# Patient Record
Sex: MALE | Race: WHITE | NOT HISPANIC OR LATINO | Employment: FULL TIME | URBAN - METROPOLITAN AREA
[De-identification: names, ages, dates, MRNs, and addresses within clinical notes are randomized per-mention and may not be internally consistent; named-entity substitution may affect disease eponyms.]

---

## 2018-06-14 ENCOUNTER — HOSPITAL ENCOUNTER (EMERGENCY)
Facility: CLINIC | Age: 38
Discharge: HOME | End: 2018-06-14
Attending: EMERGENCY MEDICINE | Admitting: EMERGENCY MEDICINE

## 2018-06-14 VITALS
TEMPERATURE: 98.5 F | SYSTOLIC BLOOD PRESSURE: 126 MMHG | DIASTOLIC BLOOD PRESSURE: 99 MMHG | BODY MASS INDEX: 23.68 KG/M2 | HEART RATE: 77 BPM | WEIGHT: 165 LBS | OXYGEN SATURATION: 98 % | RESPIRATION RATE: 18 BRPM

## 2018-06-14 DIAGNOSIS — R11.0 NAUSEA: ICD-10-CM

## 2018-06-14 DIAGNOSIS — K08.89 PAIN, DENTAL: ICD-10-CM

## 2018-06-14 DIAGNOSIS — F41.9 ANXIETY: ICD-10-CM

## 2018-06-14 LAB — INTERPRETATION ECG - MUSE: NORMAL

## 2018-06-14 PROCEDURE — 25000132 ZZH RX MED GY IP 250 OP 250 PS 637: Performed by: EMERGENCY MEDICINE

## 2018-06-14 PROCEDURE — 90791 PSYCH DIAGNOSTIC EVALUATION: CPT

## 2018-06-14 PROCEDURE — 99285 EMERGENCY DEPT VISIT HI MDM: CPT | Mod: 25

## 2018-06-14 PROCEDURE — 25000125 ZZHC RX 250: Performed by: EMERGENCY MEDICINE

## 2018-06-14 PROCEDURE — 93005 ELECTROCARDIOGRAM TRACING: CPT

## 2018-06-14 RX ORDER — PENICILLIN V POTASSIUM 500 MG/1
500 TABLET, FILM COATED ORAL 4 TIMES DAILY
Qty: 28 TABLET | Refills: 0 | Status: SHIPPED | OUTPATIENT
Start: 2018-06-14 | End: 2018-06-21

## 2018-06-14 RX ORDER — CLONAZEPAM 0.5 MG/1
1 TABLET ORAL ONCE
Status: COMPLETED | OUTPATIENT
Start: 2018-06-14 | End: 2018-06-14

## 2018-06-14 RX ORDER — IBUPROFEN 600 MG/1
600 TABLET, FILM COATED ORAL ONCE
Status: COMPLETED | OUTPATIENT
Start: 2018-06-14 | End: 2018-06-14

## 2018-06-14 RX ORDER — ONDANSETRON 4 MG/1
4 TABLET, ORALLY DISINTEGRATING ORAL ONCE
Status: COMPLETED | OUTPATIENT
Start: 2018-06-14 | End: 2018-06-14

## 2018-06-14 RX ORDER — CLONAZEPAM 0.5 MG/1
1 TABLET ORAL 2 TIMES DAILY PRN
COMMUNITY
End: 2024-03-07

## 2018-06-14 RX ADMIN — IBUPROFEN 600 MG: 600 TABLET ORAL at 06:33

## 2018-06-14 RX ADMIN — ONDANSETRON 4 MG: 4 TABLET, ORALLY DISINTEGRATING ORAL at 05:12

## 2018-06-14 RX ADMIN — CLONAZEPAM 1 MG: 0.5 TABLET ORAL at 05:09

## 2018-06-14 ASSESSMENT — ENCOUNTER SYMPTOMS
PALPITATIONS: 1
NAUSEA: 1
NERVOUS/ANXIOUS: 1
SHORTNESS OF BREATH: 0
VOMITING: 1

## 2018-06-14 NOTE — ED TRIAGE NOTES
Pt woke up with anxiety aprrox 2am, out of his clonapin that he normally takes.  Associated n/v which usually accompany anxiety attacks.  Pain in tooth as well.

## 2018-06-14 NOTE — ED AVS SNAPSHOT
[facility] Emergency Department    [address] [facility] [address]    Phone:  [phone]    Fax:  [phone]                                       Giovani Damian   MRN: [id number]    Department:  [facility] Emergency Department   Date of Visit:  6/14/2018           Patient Information     Date Of Birth          1980        Your diagnoses for this visit were:     Anxiety     Nausea     Pain, dental        You were seen by Mehul Luna MD and Michael Krishnamurthy MD.      Follow-up Information     Follow up with Emmie Blancas. Schedule an appointment as soon as possible for a visit in 2 days.    Contact information:    [address] [facility] [address] [phone]          Follow up with [facility] Emergency Department.    Specialty:  EMERGENCY MEDICINE    Why:  If symptoms worsen    Contact information:    [address] [facility] [id number] [phone]        Discharge Instructions       Discharge Instructions  Dental Pain    You have been seen today for a toothache. Your pain may be caused by an exposed nerve, an infection (pulpitis), a root abscess (pocket of pus), or other problems. You will need to see a dentist for a solution to your tooth problem. Emergency Department care is only to help control your problem until you can see a dentist; we cannot provide complete dental care.  Today, we did not find any sign that your toothache was caused by any dangerous or life-threatening condition, but sometimes symptoms develop over time and cannot be found during an emergency visit, so it is very important that you follow up with your dentist.      Generally, every Emergency Department visit should have a follow-up clinic visit with either a primary or a specialty clinic/provider. Please follow-up as instructed by your emergency provider today.    Return to the Emergency Department if:    You develop a  new fever over 100.4 F.    You cannot open your mouth normally, cannot move your tongue well, or cannot swallow.    You have new or increased swelling of your face or neck.    You develop drainage of pus or foul smelling material from around your tooth.  What can I do to help myself?    Take any antibiotic the provider may have prescribed for you today.    Avoid very hot or very cold foods as both can cause pain.    Make an appointment to see a dentist as soon as possible. Dentists are generally not  on-staff  at hospitals so we cannot  refer  to you to dentist but we may be able to provide a list of dental clinics to help you.  If you were given a prescription for medicine here today, be sure to read all of the information (including the package insert) that comes with your prescription.  This will include important information about the medicine, its side effects, and any warnings that you need to know about.  The pharmacist who fills the prescription can provide more information and answer questions you may have about the medicine.  If you have questions or concerns that the pharmacist cannot address, please call or return to the Emergency Department.   Remember that you can always come back to the Emergency Department if you are not able to see your regular provider in the amount of time listed above, if you get any new symptoms, or if there is anything that worries you.      24 Hour Appointment Hotline       To make an appointment at any [facility], call [phone] ([phone]). If you don't have a family doctor or clinic, we will help you find one. [facility] clinics are conveniently located to serve the needs of you and your family.             Review of your medicines      START taking        Dose / Directions Last dose taken    penicillin V potassium 500 MG tablet   Commonly known as:  VEETID   Dose:  500 mg   Quantity:  28 tablet        Take 1 tablet (500 mg) by mouth 4 times daily for 7 days    Refills:  0          Our records show that you are taking the medicines listed below. If these are incorrect, please call your family doctor or clinic.        Dose / Directions Last dose taken    HYDROcodone-acetaminophen 5-325 MG per tablet   Commonly known as:  NORCO   Dose:  1-2 tablet   Quantity:  15 tablet        Take 1-2 tablets by mouth every 4 hours as needed for moderate to severe pain   Refills:  0        KLONOPIN PO        Refills:  0        MOTRIN IB PO        Refills:  0        ZOLOFT PO   Dose:  25 mg        Take 25 mg by mouth daily   Refills:  0                Prescriptions were sent or printed at these locations (1 Prescription)                   Other Prescriptions                Printed at Department/Unit printer (1 of 1)         penicillin V potassium (VEETID) 500 MG tablet                Procedures and tests performed during your visit     EKG 12 lead      Orders Needing Specimen Collection     None      Pending Results     Date and Time Order Name Status Description    6/14/2018 0455 EKG 12 lead Preliminary             Pending Culture Results     No orders found from 6/12/2018 to 6/15/2018.            Pending Results Instructions     If you had any lab results that were not finalized at the time of your Discharge, you can call the ED Lab Result RN at [phone]. You will be contacted by this team for any positive Lab results or changes in treatment. The nurses are available 7 days a week from 10A to 6:30P.  You can leave a message 24 hours per day and they will return your call.        Test Results From Your Hospital Stay               Clinical Quality Measure: Blood Pressure Screening     Your blood pressure was checked while you were in the emergency department today. The last reading we obtained was  BP: (!) 126/99 . Please read the guidelines below about what these numbers mean and what you should do about them.  If your systolic blood pressure (the top number) is less than 120 and your  diastolic blood pressure (the bottom number) is less than 80, then your blood pressure is normal. There is nothing more that you need to do about it.  If your systolic blood pressure (the top number) is 120-139 or your diastolic blood pressure (the bottom number) is 80-89, your blood pressure may be higher than it should be. You should have your blood pressure rechecked within a year by a primary care provider.  If your systolic blood pressure (the top number) is 140 or greater or your diastolic blood pressure (the bottom number) is 90 or greater, you may have high blood pressure. High blood pressure is treatable, but if left untreated over time it can put you at risk for heart attack, stroke, or kidney failure. You should have your blood pressure rechecked by a primary care provider within the next 4 weeks.  If your provider in the emergency department today gave you specific instructions to follow-up with your doctor or provider even sooner than that, you should follow that instruction and not wait for up to 4 weeks for your follow-up visit.        Thank you for choosing [facility]       Thank you for choosing [facility] for your care. Our goal is always to provide you with excellent care. Hearing back from our patients is one way we can continue to improve our services. Please take a few minutes to complete the written survey that you may receive in the mail after you visit with us. Thank you!        [employer] Information     [employer] gives you secure access to your electronic health record. If you see a primary care provider, you can also send messages to your care team and make appointments. If you have questions, please call your primary care clinic.  If you do not have a primary care provider, please call [phone] and they will assist you.        Care EveryWhere ID     This is your Care EveryWhere ID. This could be used by other organizations to access your [facility] medical records  [id number]        Equal  Access to Services     MANSI [facility] : Zeinab Valencia, kirsten hassan, qamarcus muller. So [facility] [phone].    ATENCIÓN: Si habla español, tiene a palma disposición servicios gratuitos de asistencia lingüística. Llame al [phone].    We comply with applicable federal civil rights laws and [place] laws. We do not discriminate on the basis of race, color, national origin, age, disability, sex, sexual orientation, or gender identity.            After Visit Summary       This is your record. Keep this with you and show to your community pharmacist(s) and doctor(s) at your next visit.

## 2018-06-14 NOTE — ED PROVIDER NOTES
History     Chief Complaint:  Nausea & Vomiting and Anxiety      HPI   Giovani Damian is a 37 year old male with a history of anxiety, depression,and alcohol abuse who presents to the ED today for evaluation of nausea, vomiting, and anxiety. The patient presents to the ED today via ambulance after he woke up at 0200 with nausea, and feeling like he was having a panic attack with noted palpitations. The patient states that he is anxious and does not know why.  He states symptoms feel exactly similar to prior episodes of anxiety and panic attacks.  The patient denies any chest pain or shortness of breath. He also denies any new stressors in his life. The patient does not see a psychiatrist regularly. Of note, the patient recently got a 15 day supply of Klonopin on the 5th of June which he gets regularly from his PCP, Dr. Pierre, but he states that he does not have any left. The patient is also concerned about an infection to his mouth since he noticed that a piece of his tooth fell out in the night.  He acknowledges a history of poor dentition.  He does not currently have dental insurance.  He denies any recent dental procedures.  He denies any thoughts of self-harm or suicide.  He offers no other complaints or concerns at this time.    Allergies:  No known drug allergies.     Medications:    Zoloft     Past Medical History:    Depressive disorder   Alcohol abuse   Smoking   Anxiety     Past Surgical History:    Wrist fracture   Jaw fracture   Rib fracture     Family History:    Hypertension   Anxiety   Psychiatric illness.    Social History:  Marital Status: single   Presents to the ED alone   Tobacco Use: current every day smoker   Alcohol Use: no   PCP: Emmie Blancas     Review of Systems   HENT: Positive for dental problem.    Respiratory: Negative for shortness of breath.    Cardiovascular: Positive for palpitations. Negative for chest pain.   Gastrointestinal: Positive for nausea and vomiting.    Psychiatric/Behavioral: The patient is nervous/anxious.    All other systems reviewed and are negative.      Physical Exam     Patient Vitals for the past 24 hrs:   BP Temp Temp src Pulse Resp SpO2 Weight   06/14/18 0437 (!) 126/99 98.5  F (36.9  C) Oral 77 18 98 % 74.8 kg (165 lb)        Physical Exam  General:                        Well-nourished                        Speaking in full sentences             Resting comfortably on gurney, does not appear intoxicated  Eyes:                        Conjunctiva without injection or scleral icterus  ENT:                        Moist mucous membranes                        Poor dentition                        Tooth decay of upper left premolar and lower right premolar                        No fluctuance to palpation along gum line                        Nares patent                        Pinnae normal  Neck:                        Full ROM                        No stiffness appreciated  Resp:                        Lungs CTAB                        No crackles, wheezing or audible rubs                        Good air movement  CV:                                        Normal rate, regular rhythm                        S1 and S2 present                        No murmur, gallop or rub  GI:                        BS present                        Abdomen soft without distention                        Non-tender to light and deep palpation                        No guarding or rebound tenderness  Skin:                        Warm, dry, well perfused                        No rashes or open wounds on exposed skin  MSK:                        Moves all extremities                        No focal deformities or swelling  Neuro:                        Alert                        Answers questions appropriately                        Moves all extremities equally                        Gait stable  Psych:                        Normal affect, normal mood                         Denies SI or HI    Emergency Department Course   ECG:  @ 0500  Indication: anxiety   Vent. Rate 69 bpm. VT interval 132 ms. QRS duration 82 ms. QT/QTc 378/405 ms. P-R-T axis 41 69 57.   Normal sinus rhythm. Normal ECG.   Read @ 0505 by Dr. Luna.     Interventions:  (0509) Klonopin 1 mg, PO   (0512) Zofran 4 mg, PO      Emergency Department Course:  (0440) I looked up the patient in the [facility] database.   Nursing notes and vitals reviewed.  (0443) I performed an exam of the patient as documented above.    EKG was done, interpretation as above.   I spoke with DEC regarding the patient.   Patient reevaluated.  Discussed plan of care.  He is in agreement.  Will plan discharge home with close outpatient follow-up  Findings and plan explained to the patient. Patient discharged home with instructions regarding supportive care, medications, and reasons to return. The importance of close follow-up was reviewed. The patient was prescribed Penicillin  V Potassium.   Impression & Plan    Medical Decision Making:  Giovani Damian is a 37 year old male who presents with concerns regarding anxiety, nausea, and dental pain.  VS on presentation are within normal limits.  With regards to patient's dental pain, he does have evidence of poor dentition and carious teeth, his affected premolar tooth to the left upper gumline shows evidence of tooth decay, though this appears to be chronic.  There is no fluctuance to palpation amenable to percutaneous drainage at this time.  There is no evidence of oral lesions, halitosis, trismus, or intolerance of secretions to suggest deeper space abscess, or necrotizing infection.  Patient will be started on penicillin to be taken 4 times daily for 7 days.  I stressed the importance of follow-up with dentistry for definitive care.  A list of low-cost clinics was provided the patient.  With regards to patient's anxiety, he notes his symptoms feel similar to prior flares of anxiety and panic  attacks.  He does receive clonazepam through his primary care provider.  I do see he received a prescription from his primary provider on 6/5/2018 intended for a 15 day supply though he is currently out of this medication.  He denies any thoughts of self-harm or suicide.  DEC was consulted and evaluated the patient.  Please refer to their associated documentation.  Patient was provided resources for follow-up to assist with management of anxiety, as well as crisis stabilization line.  Patient was also provided information to apply for insurance coverage.  Patient is felt stable for discharge home with outpatient follow-up.  Return to ER with any other new or troubling symptoms, concerns regarding safety, severe dental pain.    Diagnosis:    ICD-10-CM    1. Anxiety F41.9    2. Nausea R11.0    3. Pain, dental K08.89        Disposition:  discharged to home    Discharge Medications:  New Prescriptions    PENICILLIN V POTASSIUM (VEETID) 500 MG TABLET    Take 1 tablet (500 mg) by mouth 4 times daily for 7 days         Alma SEE, gian serving as a scribe on 6/14/2018 at 4:43 AM to personally document services performed by Dr. Luna based on my observations and the provider's statements to me.    6/14/2018   [facility] EMERGENCY DEPARTMENT       Mehul Luna MD  06/14/18 2192

## 2018-06-14 NOTE — ED AVS SNAPSHOT
[facility] Emergency Department    [address]    [facility] [address]    Phone:  [phone]    Fax:  [phone]                                       Giovani Damian   MRN: [id number]    Department:  [facility] Emergency Department   Date of Visit:  6/14/2018           After Visit Summary Signature Page     I have received my discharge instructions, and my questions have been answered. I have discussed any challenges I see with this plan with the nurse or doctor.    ..........................................................................................................................................  Patient/Patient Representative Signature      ..........................................................................................................................................  Patient Representative Print Name and Relationship to Patient    ..................................................               ................................................  Date                                            Time    ..........................................................................................................................................  Reviewed by Signature/Title    ...................................................              ..............................................  Date                                                            Time

## 2018-08-07 ENCOUNTER — HOSPITAL ENCOUNTER (EMERGENCY)
Facility: CLINIC | Age: 38
Discharge: HOME | End: 2018-08-07
Attending: EMERGENCY MEDICINE | Admitting: EMERGENCY MEDICINE

## 2018-08-07 VITALS
WEIGHT: 170 LBS | TEMPERATURE: 98.6 F | SYSTOLIC BLOOD PRESSURE: 126 MMHG | BODY MASS INDEX: 28.32 KG/M2 | OXYGEN SATURATION: 99 % | RESPIRATION RATE: 18 BRPM | DIASTOLIC BLOOD PRESSURE: 85 MMHG | HEIGHT: 65 IN

## 2018-08-07 DIAGNOSIS — F10.930 ALCOHOL WITHDRAWAL SYNDROME WITHOUT COMPLICATION (H): ICD-10-CM

## 2018-08-07 PROCEDURE — 99282 EMERGENCY DEPT VISIT SF MDM: CPT

## 2018-08-07 PROCEDURE — 99283 EMERGENCY DEPT VISIT LOW MDM: CPT

## 2018-08-07 PROCEDURE — 25000132 ZZH RX MED GY IP 250 OP 250 PS 637: Performed by: EMERGENCY MEDICINE

## 2018-08-07 RX ORDER — DIAZEPAM 5 MG
5 TABLET ORAL EVERY 6 HOURS PRN
Qty: 3 TABLET | Refills: 0 | Status: SHIPPED | OUTPATIENT
Start: 2018-08-07 | End: 2020-01-14

## 2018-08-07 RX ORDER — DIAZEPAM 5 MG
5 TABLET ORAL ONCE
Status: COMPLETED | OUTPATIENT
Start: 2018-08-07 | End: 2018-08-07

## 2018-08-07 RX ADMIN — DIAZEPAM 5 MG: 5 TABLET ORAL at 07:11

## 2018-08-07 ASSESSMENT — ENCOUNTER SYMPTOMS
DIARRHEA: 0
NERVOUS/ANXIOUS: 1
FEVER: 0
VOMITING: 0
ABDOMINAL PAIN: 0

## 2018-08-07 NOTE — ED AVS SNAPSHOT
[facility] Emergency Department    [address]    [facility] [address]    Phone:  [phone]    Fax:  [phone]                                       Giovani Damian   MRN: [id number]    Department:  [facility] Emergency Department   Date of Visit:  8/7/2018           After Visit Summary Signature Page     I have received my discharge instructions, and my questions have been answered. I have discussed any challenges I see with this plan with the nurse or doctor.    ..........................................................................................................................................  Patient/Patient Representative Signature      ..........................................................................................................................................  Patient Representative Print Name and Relationship to Patient    ..................................................               ................................................  Date                                            Time    ..........................................................................................................................................  Reviewed by Signature/Title    ...................................................              ..............................................  Date                                                            Time

## 2018-08-07 NOTE — ED AVS SNAPSHOT
[facility] Emergency Department    [address] [facility] [address]    Phone:  [phone]    Fax:  [phone]                                       Giovani Damian   MRN: [id number]    Department:  [facility] Emergency Department   Date of Visit:  8/7/2018           Patient Information     Date Of Birth          1980        Your diagnoses for this visit were:     Alcohol withdrawal syndrome without complication (H)        You were seen by Joanie Peñaloza MD.      Follow-up Information     Follow up with Naeem Pierre MD.    Specialty:  Family Practice    Why:  within 1-2 days    Contact information:    [facility]  [address] [facility] [address] [phone]          Follow up with Outpatient alcohol treatment.    Why:  ASAP        Follow up with [facility] Emergency Department.    Specialty:  EMERGENCY MEDICINE    Why:  As needed, If symptoms worsen    Contact information:    [address] [facility] [address] [phone]      Discharge References/Attachments     ALCOHOL WITHDRAWAL (ENGLISH)      24 Hour Appointment Hotline       To make an appointment at any [facility] clinic, call [phone] ([phone]). If you don't have a family doctor or clinic, we will help you find one. [facility] clinics are conveniently located to serve the needs of you and your family.             Review of your medicines      START taking        Dose / Directions Last dose taken    diazepam 5 MG tablet   Commonly known as:  VALIUM   Dose:  5 mg   Quantity:  3 tablet        Take 1 tablet (5 mg) by mouth every 6 hours as needed for anxiety or sleep   Refills:  0          Our records show that you are taking the medicines listed below. If these are incorrect, please call your family doctor or clinic.        Dose / Directions Last dose taken    KLONOPIN PO        Refills:  0        MOTRIN IB PO         Refills:  0        ZOLOFT PO   Dose:  25 mg        Take 25 mg by mouth daily   Refills:  0                Prescriptions were sent or printed at these locations (1 Prescription)                   Other Prescriptions                Printed at Department/Unit printer (1 of 1)         diazepam (VALIUM) 5 MG tablet                Orders Needing Specimen Collection     None      Pending Results     No orders found from 8/5/2018 to 8/8/2018.            Pending Culture Results     No orders found from 8/5/2018 to 8/8/2018.            Pending Results Instructions     If you had any lab results that were not finalized at the time of your Discharge, you can call the ED Lab Result RN at [phone]. You will be contacted by this team for any positive Lab results or changes in treatment. The nurses are available 7 days a week from 10A to 6:30P.  You can leave a message 24 hours per day and they will return your call.        Test Results From Your Hospital Stay               Clinical Quality Measure: Blood Pressure Screening     Your blood pressure was checked while you were in the emergency department today. The last reading we obtained was  BP: (!) 139/98 . Please read the guidelines below about what these numbers mean and what you should do about them.  If your systolic blood pressure (the top number) is less than 120 and your diastolic blood pressure (the bottom number) is less than 80, then your blood pressure is normal. There is nothing more that you need to do about it.  If your systolic blood pressure (the top number) is 120-139 or your diastolic blood pressure (the bottom number) is 80-89, your blood pressure may be higher than it should be. You should have your blood pressure rechecked within a year by a primary care provider.  If your systolic blood pressure (the top number) is 140 or greater or your diastolic blood pressure (the bottom number) is 90 or greater, you may have high blood pressure. High blood pressure is  treatable, but if left untreated over time it can put you at risk for heart attack, stroke, or kidney failure. You should have your blood pressure rechecked by a primary care provider within the next 4 weeks.  If your provider in the emergency department today gave you specific instructions to follow-up with your doctor or provider even sooner than that, you should follow that instruction and not wait for up to 4 weeks for your follow-up visit.        Thank you for choosing [facility]       Thank you for choosing [facility] for your care. Our goal is always to provide you with excellent care. Hearing back from our patients is one way we can continue to improve our services. Please take a few minutes to complete the written survey that you may receive in the mail after you visit with us. Thank you!        [employer] Information     [facility] gives you secure access to your electronic health record. If you see a primary care provider, you can also send messages to your care team and make appointments. If you have questions, please call your primary care clinic.  If you do not have a primary care provider, please call [phone] and they will assist you.        Care EveryWhere ID     This is your Care EveryWhere ID. This could be used by other organizations to access your [facility] medical records  [id number]        Equal Access to Services     LOLIS FELIX : Hadii kin Valencia, kirsten hassan, yamile lopes, marcus garcia. So [facility] [phone].    ATENCIÓN: Si habla español, tiene a palma disposición servicios gratuitos de asistencia lingüística. Ed al [phone].    We comply with applicable federal civil rights laws and [place] laws. We do not discriminate on the basis of race, color, national origin, age, disability, sex, sexual orientation, or gender identity.            After Visit Summary       This is your record. Keep this with you and show to your community  pharmacist(s) and doctor(s) at your next visit.

## 2018-08-07 NOTE — ED TRIAGE NOTES
Patient alert and oriented times 3 .  Abc intact pt drank a lot last night now worried he might be going in to dits. Feels shaky. Wants some clonazepam he is currently out of it

## 2018-08-07 NOTE — ED PROVIDER NOTES
"  History     Chief Complaint:  Drug / Alcohol Assessment    HPI   Giovani Damian is a 37 year old male who presents to the emergency department today for drug/alcohol assessment. The patient reports he has been drinking for 3 days (last drink last night around 9-10). He also reports shaking and bad anxiety currently, and wants help dealing with the withdrawals so that he can stop drinking again. He became shaky and anxious while at home. The patient has been sober off and on for a long time, has been through treatment. He denies fevers, abdominal pain, vomiting, diarrhea. He only binge drank the last few days. He denies suicidal ideation. He hopes to go home and call his AA sponsor. He reports history of withdrawal tremors but not more recently than 2011.     Allergies:  No Known Drug Allergies     Medications:    Zoloft   Klonopin  Motrin     Past Medical History:    Anxiety  Substance abuse     Past Surgical History:    Mouth surgery    Family History:    History reviewed. No pertinent family history.     Social History:  The patient was alone.  Smoking Status: Current every day  Alcohol Use: Yes   Marital Status:  Single     Review of Systems   Constitutional: Negative for fever.   Gastrointestinal: Negative for abdominal pain, diarrhea and vomiting.   Psychiatric/Behavioral: Negative for suicidal ideas. The patient is nervous/anxious.    All other systems reviewed and are negative.    Physical Exam     Patient Vitals for the past 24 hrs:   BP Temp Temp src Heart Rate Resp SpO2 Height Weight   08/07/18 0858 126/85 - - 72 18 99 % - -   08/07/18 0809 - - - - - 97 % - -   08/07/18 0745 - - - - - 97 % - -   08/07/18 0730 - - - - - 96 % - -   08/07/18 0715 - - - - - 99 % - -   08/07/18 0645 - - - - - 98 % - -   08/07/18 0643 (!) 139/98 98.6  F (37  C) Oral 69 18 99 % 1.651 m (5' 5\") 77.1 kg (170 lb)        Physical Exam  General: Adult male sitting upright  Eyes: PERRL, Conjunctive within normal limits. No scleral " icterus.  ENT: Moist mucous membranes, oropharynx clear.   CV: Normal S1S2, no murmur, rub or gallop. Regular rate and rhythm  Resp: Clear to auscultation bilaterally, no wheezes, rales or rhonchi. Normal respiratory effort.  GI: Abdomen is soft, nontender and nondistended. No palpable masses. No rebound or guarding.  MSK: No edema. Nontender. Normal active range of motion.  Skin: Warm and dry. No rashes or lesions or ecchymoses on visible skin.  Neuro: Alert and oriented. Responds appropriately to all questions and commands. No focal findings appreciated. Normal muscle tone. No tremors.  Psych: Normal mood and affect. Pleasant.     Emergency Department Course   Interventions:  0711: Valium tablet 5mg PO     Emergency Department Course:  Nursing notes and vitals reviewed.  0654: I performed an exam of the patient as documented above.   0845: Findings and plan explained to the Patient. He is feeling improved. Patient discharged home with instructions regarding supportive care, medications, and reasons to return. The importance of close follow-up was reviewed. The patient was prescribed Valium   I personally answered all related questions prior to discharge.     Impression & Plan    Medical Decision Making:  Giovani Damian is a 37 year old male who presents for evaluation of alcohol abuse.  He is not  intoxicated here in ED by examination.  He appears on exam to be going through mild acute alcohol withdrawal. Blood work workup was not required. Patient simply requested valium to deal with the withdrawal. I discussed the use of valium in detail with him, pros and cons. He understands the desire to avoid regular use.There are no signs of co-ingestion including acetaminophen, drugs, medications, volatile alcohols. He has no signs of trauma related to alcohol use and no further workup is needed including head CT.  Alcohol counseling provided by myself and patient does not want treatment resources as he has a well  established outpatient support system.  We discussed withdrawal, seizures, DT's.  He is discharged home with  in improved condition. F/u with PCP within 3 days. Return immediately with worsening.       Diagnosis:    ICD-10-CM    1. Alcohol withdrawal syndrome without complication (H) F10.230        Disposition:  discharged to home    Discharge Medications:  Discharge Medication List as of 8/7/2018  8:48 AM      START taking these medications    Details   diazepam (VALIUM) 5 MG tablet Take 1 tablet (5 mg) by mouth every 6 hours as needed for anxiety or sleep, Disp-3 tablet, R-0, Local Print           Scribe Disclosure:  Jana SEE, am serving as a scribe at 6:51 AM on 8/7/2018 to document services personally performed by Joanie Peñaloza MD based on my observations and the provider's statements to me.    8/7/2018   [facility] EMERGENCY DEPARTMENT       Joanie Peñaloza MD  08/11/18 7051

## 2020-01-13 NOTE — PROGRESS NOTES
"Subjective     Giovani Damian is a 39 year old male who presents to clinic today for the following health issues:    HPI   Musculoskeletal problem/pain      Duration: 2-3 weeks     Description  Location: lower back, ribs and back of neck     Intensity:  severe    Accompanying signs and symptoms: cough    History  Previous similar problem: YES- has been a long time   Previous evaluation:  x-ray for broken rib     Precipitating or alleviating factors:  Trauma or overuse: no   Aggravating factors include: moving around in any way     Therapies tried and outcome: stretching    Giovani Damian is a 39 year old male who presents today for acute onset neck/back pain over the past few weeks  No specific KIMBERLY that he can recall  Simply woke up one morning and his neck was bothering him    -then his back started hurting  Pain is sharp, feels like ripping  Back pain is both sides, low lateral lumber  Moving makes worse  No pain transfer into the arms/legs      There is no problem list on file for this patient.    Past Surgical History:   Procedure Laterality Date     MOUTH SURGERY         Social History     Tobacco Use     Smoking status: Former Smoker     Packs/day: 1.00     Smokeless tobacco: Current User   Substance Use Topics     Alcohol use: Yes     Comment: over 1 litter of wisky and beer a day     History reviewed. No pertinent family history.      Reviewed and updated as needed this visit by Provider         Review of Systems   ROS COMP: Constitutional, HEENT, cardiovascular, pulmonary, gi and gu systems are negative, except as otherwise noted.      Objective    /80   Pulse 73   Temp 98.2  F (36.8  C) (Tympanic)   Resp 18   Ht 1.651 m (5' 5\")   Wt 86.5 kg (190 lb 9.6 oz)   SpO2 98%   BMI 31.72 kg/m    Body mass index is 31.72 kg/m .  Physical Exam   GENERAL: healthy, alert and no distress  NECK: there is ttp to the bilateral paracervical muscles. Extension and rotational movement create worse pain  RESP: " lungs clear to auscultation - no rales, rhonchi or wheezes  CV: regular rates and rhythm, normal S1 S2, no S3 or S4 and no murmur, click or rub  MS: no gross musculoskeletal defects noted, no edema  BACK: low paralumbar tenderness. Neg SLR    Diagnostic Test Results:  none       1. Cervicalgia  2. Acute midline low back pain without sciatica  Suspect muscular. Trial muscle relaxant and nsaids. Stay with heat and stretching. Physical therapy. Follow up if not improving    3. Need for prophylactic vaccination and inoculation against influenza

## 2020-01-14 ENCOUNTER — OFFICE VISIT (OUTPATIENT)
Dept: FAMILY MEDICINE | Facility: CLINIC | Age: 40
End: 2020-01-14
Payer: COMMERCIAL

## 2020-01-14 VITALS
OXYGEN SATURATION: 98 % | BODY MASS INDEX: 31.75 KG/M2 | WEIGHT: 190.6 LBS | HEART RATE: 73 BPM | HEIGHT: 65 IN | SYSTOLIC BLOOD PRESSURE: 120 MMHG | TEMPERATURE: 98.2 F | RESPIRATION RATE: 18 BRPM | DIASTOLIC BLOOD PRESSURE: 80 MMHG

## 2020-01-14 DIAGNOSIS — Z23 NEED FOR PROPHYLACTIC VACCINATION AND INOCULATION AGAINST INFLUENZA: ICD-10-CM

## 2020-01-14 DIAGNOSIS — M54.50 ACUTE MIDLINE LOW BACK PAIN WITHOUT SCIATICA: ICD-10-CM

## 2020-01-14 DIAGNOSIS — M54.2 CERVICALGIA: Primary | ICD-10-CM

## 2020-01-14 PROCEDURE — 90471 IMMUNIZATION ADMIN: CPT | Performed by: PHYSICIAN ASSISTANT

## 2020-01-14 PROCEDURE — 99203 OFFICE O/P NEW LOW 30 MIN: CPT | Mod: 25 | Performed by: PHYSICIAN ASSISTANT

## 2020-01-14 PROCEDURE — 90686 IIV4 VACC NO PRSV 0.5 ML IM: CPT | Performed by: PHYSICIAN ASSISTANT

## 2020-01-14 RX ORDER — SERTRALINE HYDROCHLORIDE 100 MG/1
100 TABLET, FILM COATED ORAL DAILY
COMMUNITY
Start: 2019-12-10 | End: 2020-02-18

## 2020-01-14 RX ORDER — NAPROXEN 500 MG/1
500 TABLET ORAL 2 TIMES DAILY WITH MEALS
Qty: 28 TABLET | Refills: 0 | Status: SHIPPED | OUTPATIENT
Start: 2020-01-14 | End: 2023-01-18

## 2020-01-14 RX ORDER — CYCLOBENZAPRINE HCL 10 MG
10 TABLET ORAL
Qty: 14 TABLET | Refills: 0 | Status: SHIPPED | OUTPATIENT
Start: 2020-01-14 | End: 2023-01-18

## 2020-01-14 ASSESSMENT — MIFFLIN-ST. JEOR: SCORE: 1706.44

## 2020-02-18 ENCOUNTER — OFFICE VISIT (OUTPATIENT)
Dept: FAMILY MEDICINE | Facility: CLINIC | Age: 40
End: 2020-02-18
Payer: COMMERCIAL

## 2020-02-18 VITALS
DIASTOLIC BLOOD PRESSURE: 80 MMHG | TEMPERATURE: 98.1 F | OXYGEN SATURATION: 97 % | RESPIRATION RATE: 16 BRPM | BODY MASS INDEX: 31.99 KG/M2 | HEIGHT: 65 IN | SYSTOLIC BLOOD PRESSURE: 128 MMHG | WEIGHT: 192 LBS | HEART RATE: 74 BPM

## 2020-02-18 DIAGNOSIS — F41.1 GAD (GENERALIZED ANXIETY DISORDER): ICD-10-CM

## 2020-02-18 DIAGNOSIS — F41.0 PANIC DISORDER: Primary | ICD-10-CM

## 2020-02-18 DIAGNOSIS — F10.11 HISTORY OF ALCOHOL ABUSE: ICD-10-CM

## 2020-02-18 PROCEDURE — 99214 OFFICE O/P EST MOD 30 MIN: CPT | Performed by: PHYSICIAN ASSISTANT

## 2020-02-18 RX ORDER — CYCLOBENZAPRINE HCL 10 MG
10 TABLET ORAL
Qty: 14 TABLET | Refills: 0 | Status: CANCELLED | OUTPATIENT
Start: 2020-02-18

## 2020-02-18 RX ORDER — CLONAZEPAM 2 MG/1
TABLET ORAL
Status: CANCELLED | OUTPATIENT
Start: 2020-02-18

## 2020-02-18 RX ORDER — NAPROXEN 500 MG/1
500 TABLET ORAL 2 TIMES DAILY WITH MEALS
Qty: 28 TABLET | Refills: 0 | Status: CANCELLED | OUTPATIENT
Start: 2020-02-18

## 2020-02-18 RX ORDER — CLONAZEPAM 0.5 MG/1
.5-1 TABLET ORAL 2 TIMES DAILY PRN
Qty: 20 TABLET | Refills: 0 | Status: SHIPPED | OUTPATIENT
Start: 2020-02-18 | End: 2022-02-03

## 2020-02-18 RX ORDER — SERTRALINE HYDROCHLORIDE 100 MG/1
100 TABLET, FILM COATED ORAL DAILY
Status: CANCELLED | OUTPATIENT
Start: 2020-02-18

## 2020-02-18 RX ORDER — SERTRALINE HYDROCHLORIDE 100 MG/1
100 TABLET, FILM COATED ORAL DAILY
Qty: 90 TABLET | Refills: 0 | Status: SHIPPED | OUTPATIENT
Start: 2020-02-18 | End: 2023-01-18

## 2020-02-18 ASSESSMENT — PATIENT HEALTH QUESTIONNAIRE - PHQ9
SUM OF ALL RESPONSES TO PHQ QUESTIONS 1-9: 13
5. POOR APPETITE OR OVEREATING: NEARLY EVERY DAY

## 2020-02-18 ASSESSMENT — ANXIETY QUESTIONNAIRES
5. BEING SO RESTLESS THAT IT IS HARD TO SIT STILL: NEARLY EVERY DAY
IF YOU CHECKED OFF ANY PROBLEMS ON THIS QUESTIONNAIRE, HOW DIFFICULT HAVE THESE PROBLEMS MADE IT FOR YOU TO DO YOUR WORK, TAKE CARE OF THINGS AT HOME, OR GET ALONG WITH OTHER PEOPLE: SOMEWHAT DIFFICULT
2. NOT BEING ABLE TO STOP OR CONTROL WORRYING: NEARLY EVERY DAY
1. FEELING NERVOUS, ANXIOUS, OR ON EDGE: NEARLY EVERY DAY
6. BECOMING EASILY ANNOYED OR IRRITABLE: NEARLY EVERY DAY
GAD7 TOTAL SCORE: 21
3. WORRYING TOO MUCH ABOUT DIFFERENT THINGS: NEARLY EVERY DAY
7. FEELING AFRAID AS IF SOMETHING AWFUL MIGHT HAPPEN: NEARLY EVERY DAY

## 2020-02-18 ASSESSMENT — MIFFLIN-ST. JEOR: SCORE: 1712.79

## 2020-02-18 NOTE — PROGRESS NOTES
"Subjective     Giovani Damian is a 39 year old male who presents to clinic today for the following health issues:    HPI   Medication Followup of Zoloft and Klonopin    Taking Medication as prescribed: yes    Side Effects:  None    Medication Helping Symptoms:  Zoloft has been helping a little bit but has been more anxious due to brother passing recently    Would like to talk to PCP when he gets back about increasing dose    Prior to brother passing was working well for him    Has not used Klonopin recently but feels he needs it now    History of anxiety and panic attacks. Treated for past 1-2 years with sertraline 100 mg daily. Has used clonzaepam for panic in the past, has not needed it in over 1 year. Prior treatment was with Dr. Pierre at [facility]. Patient recently moved to [place] and is planning on establishing care here.    Brother passed away last week so anxiety has increased  Notes anxiety attacks and difficulty sleeping  Also has significant anxiety with flying and has to travel to [place] later this week for brother's .   Would like refill of Klonopin  Has tried hydroxyzine in the past, does not help when panic attacks are bad  Notes feeling \"a little down\" but not depressed, mostly anxious and sad.    He has been seeing a psychologist weekly for the past month and notes that has been helping, planning on continuing with this. Feels like anxiety was fairly well controlled on the sertraline prior to his brother's recent health crisis and death.     He does have a history of alcohol abuse but has not used alcohol for over 1 year. No history of other substance abuse. He feels like he has a good support system and is not concerned that he will start drinking again. His brother  of alcohol related complications and patient does not want the same to happen to him, reports his children as his motivation.    Denies depression. No suicidal or homicidal ideation.       Patient " "Active Problem List   Diagnosis     Panic disorder     Past Surgical History:   Procedure Laterality Date     MOUTH SURGERY         Social History     Tobacco Use     Smoking status: Former Smoker     Packs/day: 1.00     Types: Dip, chew, snus or snuff     Smokeless tobacco: Current User     Tobacco comment: using nicotine pouches- 2/18/20   Substance Use Topics     Alcohol use: Yes     Comment: over 1 litter of wisky and beer a day     Family History   Problem Relation Age of Onset     No Known Problems Mother      No Known Problems Father      No Known Problems Maternal Grandmother      No Known Problems Maternal Grandfather      No Known Problems Paternal Grandmother      No Known Problems Paternal Grandfather      No Known Problems Brother          Current Outpatient Medications   Medication Sig Dispense Refill     ClonazePAM (KLONOPIN PO) Take by mouth daily as needed for anxiety        clonazePAM 0.5 MG PO tablet Take 1-2 tablets (0.5-1 mg) by mouth 2 times daily as needed for anxiety 20 tablet 0     cyclobenzaprine (FLEXERIL) 10 MG tablet Take 1 tablet (10 mg) by mouth nightly as needed for muscle spasms 14 tablet 0     naproxen (NAPROSYN) 500 MG tablet Take 1 tablet (500 mg) by mouth 2 times daily (with meals) 28 tablet 0     sertraline 100 MG PO tablet Take 1 tablet (100 mg) by mouth daily 90 tablet 0     Allergies   Allergen Reactions     Seasonal Allergies        Reviewed and updated as needed this visit by Provider  Tobacco  Med Hx  Surg Hx         Review of Systems   ROS COMP: Constitutional, HEENT, cardiovascular, pulmonary, gi systems are negative, except as otherwise noted.      Objective    /80 (BP Location: Right arm, Patient Position: Chair, Cuff Size: Adult Regular)   Pulse 74   Temp 98.1  F (36.7  C) (Oral)   Resp 16   Ht 1.651 m (5' 5\")   Wt 87.1 kg (192 lb)   SpO2 97%   BMI 31.95 kg/m    Body mass index is 31.95 kg/m .  Physical Exam   GENERAL: healthy, alert and no " distress  RESP: lungs clear to auscultation - no rales, rhonchi or wheezes  CV: regular rate and rhythm  PSYCH: mentation appears normal, affect normal/bright    Diagnostic Test Results: None         Assessment & Plan     1. Panic disorder  - sertraline 100 MG PO tablet; Take 1 tablet (100 mg) by mouth daily  Dispense: 90 tablet; Refill: 0  - clonazePAM 0.5 MG PO tablet; Take 1-2 tablets (0.5-1 mg) by mouth 2 times daily as needed for anxiety  Dispense: 20 tablet; Refill: 0    History of anxiety and panic previously managed at [facility]. He is planning on establishing care here as he recently moved here. Has been taking sertraline 100 mg daily with good control of symptoms for at least 1 year. Previously took clonazepam for panic but has not needed it for over 1 year. However, reports significantly increased anxiety and panic symptoms over the past week due to his brother's health crisis and death. He needs to travel to [place] this weekend for his brother's  and he has a lot of anxiety surrounding this. Provided small supply of klonopin (he previously took 1 mg dose, will have him start with 0.5 mg but can use 1 mg if needed). Will refill sertraline for 3 months as he is due for a refill - advised him to follow-up to establish care in the next few months for further refills and medication management. Advised he continue with counseling.    2. SATNAM (generalized anxiety disorder)  - sertraline 100 MG PO tablet; Take 1 tablet (100 mg) by mouth daily  Dispense: 90 tablet; Refill: 0  - clonazePAM 0.5 MG PO tablet; Take 1-2 tablets (0.5-1 mg) by mouth 2 times daily as needed for anxiety  Dispense: 20 tablet; Refill: 0  See above    3. History of alcohol abuse  No alcohol use for over 1 year. Has good support. Continue to abstain.    Risks and benefits of treatment plan discussed. Patient and/or parent acknowledges and agrees with plan of care, all questions answered.      Return in about 2  months (around 4/18/2020) for Preventive Physical Exam, Establish Care.    Dina Aponte PA-C  [facility]

## 2020-02-18 NOTE — TELEPHONE ENCOUNTER
Pt will be leaving Thursday morning for a  and needs meds to be refilled. Would like to be seen before the trip.   cyclobenzaprine (FLEXERIL) 10 MG tablet  naproxen (NAPROSYN) 500 MG tablet    [phone] call anytime and can leave a message  Using  [facility]

## 2020-02-19 ASSESSMENT — ANXIETY QUESTIONNAIRES: GAD7 TOTAL SCORE: 21

## 2020-02-19 NOTE — PATIENT INSTRUCTIONS
Take klonopin as needed for panic/severe anxiety. Continue sertraline as you have been. Follow-up with Lexa Richard PA-C to establish care and for physical in next 2 months.

## 2020-03-01 ENCOUNTER — HEALTH MAINTENANCE LETTER (OUTPATIENT)
Age: 40
End: 2020-03-01

## 2020-12-14 ENCOUNTER — HEALTH MAINTENANCE LETTER (OUTPATIENT)
Age: 40
End: 2020-12-14

## 2021-04-17 ENCOUNTER — HEALTH MAINTENANCE LETTER (OUTPATIENT)
Age: 41
End: 2021-04-17

## 2021-10-02 ENCOUNTER — HEALTH MAINTENANCE LETTER (OUTPATIENT)
Age: 41
End: 2021-10-02

## 2022-02-03 ENCOUNTER — HOSPITAL ENCOUNTER (EMERGENCY)
Facility: CLINIC | Age: 42
Discharge: HOME | End: 2022-02-03
Attending: EMERGENCY MEDICINE | Admitting: EMERGENCY MEDICINE
Payer: COMMERCIAL

## 2022-02-03 VITALS
DIASTOLIC BLOOD PRESSURE: 91 MMHG | WEIGHT: 192.8 LBS | HEART RATE: 79 BPM | RESPIRATION RATE: 20 BRPM | TEMPERATURE: 97.5 F | OXYGEN SATURATION: 98 % | SYSTOLIC BLOOD PRESSURE: 132 MMHG | BODY MASS INDEX: 32.08 KG/M2

## 2022-02-03 DIAGNOSIS — Z78.9 ALCOHOL USE: Primary | ICD-10-CM

## 2022-02-03 DIAGNOSIS — F41.0 PANIC DISORDER: ICD-10-CM

## 2022-02-03 DIAGNOSIS — F41.1 GAD (GENERALIZED ANXIETY DISORDER): ICD-10-CM

## 2022-02-03 PROCEDURE — 99283 EMERGENCY DEPT VISIT LOW MDM: CPT

## 2022-02-03 PROCEDURE — 250N000013 HC RX MED GY IP 250 OP 250 PS 637: Performed by: EMERGENCY MEDICINE

## 2022-02-03 RX ORDER — CLONAZEPAM 0.5 MG/1
1 TABLET ORAL ONCE
Status: DISCONTINUED | OUTPATIENT
Start: 2022-02-03 | End: 2022-02-03 | Stop reason: HOSPADM

## 2022-02-03 RX ORDER — CLONAZEPAM 0.5 MG/1
.5-1 TABLET ORAL 2 TIMES DAILY PRN
Qty: 6 TABLET | Refills: 0 | Status: SHIPPED | OUTPATIENT
Start: 2022-02-03 | End: 2023-01-18

## 2022-02-03 RX ORDER — CLONAZEPAM 0.5 MG/1
1 TABLET ORAL ONCE
Status: COMPLETED | OUTPATIENT
Start: 2022-02-03 | End: 2022-02-03

## 2022-02-03 RX ADMIN — CLONAZEPAM 1 MG: 0.5 TABLET ORAL at 09:00

## 2022-02-03 ASSESSMENT — ENCOUNTER SYMPTOMS
VOMITING: 0
NAUSEA: 1
ABDOMINAL PAIN: 0
TREMORS: 1
COUGH: 0
FEVER: 0
DIZZINESS: 0

## 2022-02-03 NOTE — DISCHARGE INSTRUCTIONS
We gave symptomatic treatment here in the ED, however going forward it is in your interests to have a plan development for treatment of your condition  - CALL your primary doctor and your psychiatrist to set up a follow up visit about your alcohol use and develop a plan for treatment going forward    - CALL your AA sponsor to also help develop a plan going forward on what to do regarding your usage    - GET BACK into regular AA and work the program regularly

## 2022-02-03 NOTE — ED TRIAGE NOTES
Pt arrives with c/o ETOH withdrawal. Pt requesting benzos to help with detox process. Pt does not want to go to a detox facility at this time. Pt endorses nausea, tremors. Denies hx of withdrawal seizures. Last drink at 2000 yesterday. ABCs intact.

## 2022-02-03 NOTE — ED PROVIDER NOTES
"chiatris  History   Chief Complaint:  Alcohol Problem       The history is provided by the patient.      Giovani Damian is a 41 year old male with history of alcohol abuse who presents with alcohol problem. The patient was sober for 2 years until 2 days ago when he started drinking 750 ml of whiskey. He started drinking again because he was taking Kratom for years. He endorses having 8 alcoholic seltzers yesterday and his last drink occurred at 2000 last night.    His wife drove him to the ED today so he could receive Benzodiazepine, but \"does not want to go to detox\". Symptoms mentioned at bedside include delirium tremens and nausea, but denies vomiting. He does not have a history of withdrawals, but is concerned about alcohol withdrawal because \"his brother  from it\". He last underwent treatment for alcohol abuse 2 years ago at [facility] in [place], [place]. He denies abusing any other substances.       Medications mentioned include Clonazepam, Duloxetine, and Buspirone. He is on Clonazepam for anxiety, and usually takes multiple times a day. He is currently seeing a Psychiatrist once a month as well as a therapist.     He denies hallucination, suicidal/homicidal ideations, chest pain, abdominal pain, cough, fevers, dizziness, or blurry vision. His primary care physician is Atilio Yuen and his Psychiatrist is Severino De Souza.       Review of Systems   Constitutional: Negative for fever.   Eyes: Negative for visual disturbance.   Respiratory: Negative for cough.    Cardiovascular: Negative for chest pain.   Gastrointestinal: Positive for nausea. Negative for abdominal pain and vomiting.   Neurological: Positive for tremors. Negative for dizziness.   Psychiatric/Behavioral: Negative for suicidal ideas.   All other systems reviewed and are negative.    Allergies:  Seasonal Allergies    Medications:  Klonopin   Flexeril   Naprosyn   Sertraline   Buspirone     Past Medical History:     Anxiety   Substance abuse "   Panic disorder     Depressive disorder     Past Surgical History:    Jaw fracture surgery   Wrist fracture surgery   Closed rib fracture surgery       Family History:    Father - HTN, anxiety   Sister - anxiety     Social History:  Patient presents to the ED alone via car  Hx of alcohol use, tobacco use, and marijuana use     Physical Exam     Patient Vitals for the past 24 hrs:   BP Temp Pulse Resp SpO2 Weight   02/03/22 0900 (!) 132/91 -- 79 20 98 % --   02/03/22 0815 -- -- -- -- -- 87.5 kg (192 lb 12.8 oz)   02/03/22 0812 (!) 146/116 97.5  F (36.4  C) (!) 122 20 98 % --       Physical Exam  Constitutional: Patient is well appearing. No distress.  Head: Atraumatic.  Eyes: Conjunctivae and EOM are normal. No scleral icterus.  Neck: Normal range of motion. Neck supple.   Cardiovascular: Normal rate, regular rhythm, normal heart sounds and intact distal pulses. Cap refill minus 2 seconds.   Pulmonary/Chest: Breath sounds normal. No respiratory distress.  Abdominal: Soft. Bowel sounds are normal. No distension. No tenderness. No rebound or guarding.   Musculoskeletal: Normal range of motion. No edema or tenderness.   Neurological: Alert and orientated to person, place, and time. No observable focal neuro deficit  Skin: Warm and dry. No rash noted. Not diaphoretic.     Emergency Department Course   Emergency Department Course:    Reviewed:  I reviewed nursing notes, vitals, past medical history and Care Everywhere    Assessments:  0818 I obtained history and examined the patient as noted above.   0858 I rechecked the patient and discussed plan for discharge home.    Interventions:  0900 Klonopin 1 mg PO    Disposition:  The patient was discharged to home.     Impression & Plan   Medical Decision Making:  Giovani Damian is a 41 year old male who presents for evaluation of alcohol abuse. Patient hashistory of alcohol withdrawal seizures. There are no signs of co-ingestion including acetaminophen, drugs, medications,  volatile alcohols.  He has no signs of trauma related to alcohol use and no further workup is needed including head CT.ROS neg and no red flags on abdomen.  Alcohol counseling provided by myself and patient does want treatment resources.  DEC/SW did not evaluate patient.         Diagnosis:    ICD-10-CM    1. Alcohol use  Z72.89    2. Panic disorder  F41.0 clonazePAM (KLONOPIN) 0.5 MG tablet   3. SATNAM (generalized anxiety disorder)  F41.1 clonazePAM (KLONOPIN) 0.5 MG tablet     This Hx, Px, and Plan was performed personally and patient was cared for in conjunction with Hero Weeks DO  PGY1 Family Medicine Resident   [facility]    Department of Family Medicine and [facility]       Scribe Disclosure:  I, Roderick Laureano, am serving as a scribe at 8:15 AM on 2/3/2022 to document services personally performed by Mike Gilman MD based on my observations and the provider's statements to me.        Mike Gilman MD  02/03/22 1843

## 2022-05-14 ENCOUNTER — HEALTH MAINTENANCE LETTER (OUTPATIENT)
Age: 42
End: 2022-05-14

## 2022-09-03 ENCOUNTER — HEALTH MAINTENANCE LETTER (OUTPATIENT)
Age: 42
End: 2022-09-03

## 2022-12-17 ENCOUNTER — APPOINTMENT (OUTPATIENT)
Dept: CT IMAGING | Facility: CLINIC | Age: 42
End: 2022-12-17
Attending: EMERGENCY MEDICINE
Payer: COMMERCIAL

## 2022-12-17 ENCOUNTER — HOSPITAL ENCOUNTER (EMERGENCY)
Facility: CLINIC | Age: 42
Discharge: HOME | End: 2022-12-17
Attending: EMERGENCY MEDICINE | Admitting: EMERGENCY MEDICINE
Payer: COMMERCIAL

## 2022-12-17 ENCOUNTER — APPOINTMENT (OUTPATIENT)
Dept: GENERAL RADIOLOGY | Facility: CLINIC | Age: 42
End: 2022-12-17
Attending: EMERGENCY MEDICINE
Payer: COMMERCIAL

## 2022-12-17 VITALS
SYSTOLIC BLOOD PRESSURE: 162 MMHG | RESPIRATION RATE: 18 BRPM | HEART RATE: 55 BPM | DIASTOLIC BLOOD PRESSURE: 102 MMHG | OXYGEN SATURATION: 96 % | TEMPERATURE: 97.9 F

## 2022-12-17 DIAGNOSIS — F10.10 ALCOHOL ABUSE: ICD-10-CM

## 2022-12-17 DIAGNOSIS — R10.13 ABDOMINAL PAIN, EPIGASTRIC: ICD-10-CM

## 2022-12-17 LAB
ALBUMIN SERPL BCG-MCNC: 4.2 G/DL (ref 3.5–5.2)
ALP SERPL-CCNC: 119 U/L (ref 40–129)
ALT SERPL W P-5'-P-CCNC: 35 U/L (ref 10–50)
ANION GAP SERPL CALCULATED.3IONS-SCNC: 12 MMOL/L (ref 7–15)
AST SERPL W P-5'-P-CCNC: 25 U/L (ref 10–50)
BASOPHILS # BLD AUTO: 0 10E3/UL (ref 0–0.2)
BASOPHILS NFR BLD AUTO: 0 %
BILIRUB SERPL-MCNC: 0.5 MG/DL
BUN SERPL-MCNC: 10.5 MG/DL (ref 6–20)
CALCIUM SERPL-MCNC: 8.8 MG/DL (ref 8.6–10)
CHLORIDE SERPL-SCNC: 101 MMOL/L (ref 98–107)
CREAT SERPL-MCNC: 0.76 MG/DL (ref 0.67–1.17)
DEPRECATED HCO3 PLAS-SCNC: 26 MMOL/L (ref 22–29)
EOSINOPHIL # BLD AUTO: 0 10E3/UL (ref 0–0.7)
EOSINOPHIL NFR BLD AUTO: 1 %
ERYTHROCYTE [DISTWIDTH] IN BLOOD BY AUTOMATED COUNT: 13.1 % (ref 10–15)
ETHANOL SERPL-MCNC: <0.01 G/DL
GFR SERPL CREATININE-BSD FRML MDRD: >90 ML/MIN/1.73M2
GLUCOSE SERPL-MCNC: 105 MG/DL (ref 70–99)
HCT VFR BLD AUTO: 43.2 % (ref 40–53)
HGB BLD-MCNC: 14.5 G/DL (ref 13.3–17.7)
HOLD SPECIMEN: NORMAL
HOLD SPECIMEN: NORMAL
IMM GRANULOCYTES # BLD: 0 10E3/UL
IMM GRANULOCYTES NFR BLD: 0 %
LIPASE SERPL-CCNC: 36 U/L (ref 13–60)
LYMPHOCYTES # BLD AUTO: 1.6 10E3/UL (ref 0.8–5.3)
LYMPHOCYTES NFR BLD AUTO: 20 %
MAGNESIUM SERPL-MCNC: 1.7 MG/DL (ref 1.7–2.3)
MCH RBC QN AUTO: 30.6 PG (ref 26.5–33)
MCHC RBC AUTO-ENTMCNC: 33.6 G/DL (ref 31.5–36.5)
MCV RBC AUTO: 91 FL (ref 78–100)
MONOCYTES # BLD AUTO: 0.4 10E3/UL (ref 0–1.3)
MONOCYTES NFR BLD AUTO: 5 %
NEUTROPHILS # BLD AUTO: 5.7 10E3/UL (ref 1.6–8.3)
NEUTROPHILS NFR BLD AUTO: 74 %
NRBC # BLD AUTO: 0 10E3/UL
NRBC BLD AUTO-RTO: 0 /100
PHOSPHATE SERPL-MCNC: 3.5 MG/DL (ref 2.5–4.5)
PLATELET # BLD AUTO: 307 10E3/UL (ref 150–450)
POTASSIUM SERPL-SCNC: 3.7 MMOL/L (ref 3.4–5.3)
PROT SERPL-MCNC: 6.6 G/DL (ref 6.4–8.3)
RBC # BLD AUTO: 4.74 10E6/UL (ref 4.4–5.9)
SODIUM SERPL-SCNC: 139 MMOL/L (ref 136–145)
TROPONIN T SERPL HS-MCNC: 6 NG/L
WBC # BLD AUTO: 7.7 10E3/UL (ref 4–11)

## 2022-12-17 PROCEDURE — 84484 ASSAY OF TROPONIN QUANT: CPT | Performed by: EMERGENCY MEDICINE

## 2022-12-17 PROCEDURE — 85025 COMPLETE CBC W/AUTO DIFF WBC: CPT | Performed by: EMERGENCY MEDICINE

## 2022-12-17 PROCEDURE — 84100 ASSAY OF PHOSPHORUS: CPT | Performed by: EMERGENCY MEDICINE

## 2022-12-17 PROCEDURE — 70450 CT HEAD/BRAIN W/O DYE: CPT

## 2022-12-17 PROCEDURE — 71046 X-RAY EXAM CHEST 2 VIEWS: CPT

## 2022-12-17 PROCEDURE — 82077 ASSAY SPEC XCP UR&BREATH IA: CPT | Performed by: EMERGENCY MEDICINE

## 2022-12-17 PROCEDURE — 250N000009 HC RX 250: Performed by: EMERGENCY MEDICINE

## 2022-12-17 PROCEDURE — 80053 COMPREHEN METABOLIC PANEL: CPT | Performed by: EMERGENCY MEDICINE

## 2022-12-17 PROCEDURE — 83690 ASSAY OF LIPASE: CPT | Performed by: EMERGENCY MEDICINE

## 2022-12-17 PROCEDURE — 96365 THER/PROPH/DIAG IV INF INIT: CPT | Mod: 59

## 2022-12-17 PROCEDURE — 99285 EMERGENCY DEPT VISIT HI MDM: CPT | Mod: 25

## 2022-12-17 PROCEDURE — 258N000003 HC RX IP 258 OP 636: Performed by: EMERGENCY MEDICINE

## 2022-12-17 PROCEDURE — 250N000013 HC RX MED GY IP 250 OP 250 PS 637: Performed by: EMERGENCY MEDICINE

## 2022-12-17 PROCEDURE — 96366 THER/PROPH/DIAG IV INF ADDON: CPT

## 2022-12-17 PROCEDURE — 36415 COLL VENOUS BLD VENIPUNCTURE: CPT | Performed by: EMERGENCY MEDICINE

## 2022-12-17 PROCEDURE — 250N000011 HC RX IP 250 OP 636: Performed by: EMERGENCY MEDICINE

## 2022-12-17 PROCEDURE — 96376 TX/PRO/DX INJ SAME DRUG ADON: CPT

## 2022-12-17 PROCEDURE — 83735 ASSAY OF MAGNESIUM: CPT | Performed by: EMERGENCY MEDICINE

## 2022-12-17 PROCEDURE — 96375 TX/PRO/DX INJ NEW DRUG ADDON: CPT

## 2022-12-17 PROCEDURE — 74177 CT ABD & PELVIS W/CONTRAST: CPT

## 2022-12-17 PROCEDURE — C9113 INJ PANTOPRAZOLE SODIUM, VIA: HCPCS | Performed by: EMERGENCY MEDICINE

## 2022-12-17 RX ORDER — DIPHENHYDRAMINE HYDROCHLORIDE 50 MG/ML
25 INJECTION INTRAMUSCULAR; INTRAVENOUS ONCE
Status: COMPLETED | OUTPATIENT
Start: 2022-12-17 | End: 2022-12-17

## 2022-12-17 RX ORDER — LORAZEPAM 2 MG/ML
1 INJECTION INTRAMUSCULAR ONCE
Status: COMPLETED | OUTPATIENT
Start: 2022-12-17 | End: 2022-12-17

## 2022-12-17 RX ORDER — ONDANSETRON 4 MG/1
4 TABLET, ORALLY DISINTEGRATING ORAL EVERY 6 HOURS PRN
Qty: 20 TABLET | Refills: 0 | Status: SHIPPED | OUTPATIENT
Start: 2022-12-17 | End: 2023-01-18

## 2022-12-17 RX ORDER — GABAPENTIN 300 MG/1
CAPSULE ORAL
Qty: 10 CAPSULE | Refills: 0 | Status: SHIPPED | OUTPATIENT
Start: 2022-12-17 | End: 2023-01-18

## 2022-12-17 RX ORDER — PANTOPRAZOLE SODIUM 40 MG/1
40 TABLET, DELAYED RELEASE ORAL DAILY
Qty: 30 TABLET | Refills: 0 | Status: SHIPPED | OUTPATIENT
Start: 2022-12-17 | End: 2023-01-16

## 2022-12-17 RX ORDER — IOPAMIDOL 755 MG/ML
500 INJECTION, SOLUTION INTRAVASCULAR ONCE
Status: COMPLETED | OUTPATIENT
Start: 2022-12-17 | End: 2022-12-17

## 2022-12-17 RX ORDER — LORAZEPAM 2 MG/ML
0.5 INJECTION INTRAMUSCULAR ONCE
Status: COMPLETED | OUTPATIENT
Start: 2022-12-17 | End: 2022-12-17

## 2022-12-17 RX ORDER — HALOPERIDOL 5 MG/ML
2 INJECTION INTRAMUSCULAR ONCE
Status: COMPLETED | OUTPATIENT
Start: 2022-12-17 | End: 2022-12-17

## 2022-12-17 RX ORDER — ONDANSETRON 2 MG/ML
4 INJECTION INTRAMUSCULAR; INTRAVENOUS EVERY 30 MIN PRN
Status: DISCONTINUED | OUTPATIENT
Start: 2022-12-17 | End: 2022-12-18 | Stop reason: HOSPADM

## 2022-12-17 RX ADMIN — LORAZEPAM 0.5 MG: 2 INJECTION INTRAMUSCULAR; INTRAVENOUS at 20:42

## 2022-12-17 RX ADMIN — DIPHENHYDRAMINE HYDROCHLORIDE 25 MG: 50 INJECTION, SOLUTION INTRAMUSCULAR; INTRAVENOUS at 17:54

## 2022-12-17 RX ADMIN — SODIUM CHLORIDE 64 ML: 9 INJECTION, SOLUTION INTRAVENOUS at 18:37

## 2022-12-17 RX ADMIN — LORAZEPAM 1 MG: 2 INJECTION INTRAMUSCULAR; INTRAVENOUS at 16:32

## 2022-12-17 RX ADMIN — PANTOPRAZOLE SODIUM 80 MG: 40 INJECTION, POWDER, FOR SOLUTION INTRAVENOUS at 16:32

## 2022-12-17 RX ADMIN — ALUMINUM HYDROXIDE, MAGNESIUM HYDROXIDE, AND SIMETHICONE 30 ML: 200; 200; 20 SUSPENSION ORAL at 16:32

## 2022-12-17 RX ADMIN — IOPAMIDOL 96 ML: 755 INJECTION, SOLUTION INTRAVENOUS at 18:34

## 2022-12-17 RX ADMIN — HALOPERIDOL LACTATE 2 MG: 5 INJECTION, SOLUTION INTRAMUSCULAR at 20:43

## 2022-12-17 RX ADMIN — FOLIC ACID: 5 INJECTION, SOLUTION INTRAMUSCULAR; INTRAVENOUS; SUBCUTANEOUS at 16:31

## 2022-12-17 RX ADMIN — AMOXICILLIN AND CLAVULANATE POTASSIUM 1 TABLET: 875; 125 TABLET, FILM COATED ORAL at 22:05

## 2022-12-17 RX ADMIN — PROCHLORPERAZINE EDISYLATE 10 MG: 5 INJECTION INTRAMUSCULAR; INTRAVENOUS at 17:54

## 2022-12-17 ASSESSMENT — ENCOUNTER SYMPTOMS
BLOOD IN STOOL: 0
VOMITING: 0
SHORTNESS OF BREATH: 1
ABDOMINAL PAIN: 1

## 2022-12-17 ASSESSMENT — ACTIVITIES OF DAILY LIVING (ADL)
ADLS_ACUITY_SCORE: 35

## 2022-12-17 NOTE — ED TRIAGE NOTES
"Pt here for abdominal pain and withdrawal symptoms. Feels anxious and \"like I want to crawl out of my skin.\" Also is having upper abdominal pain. Denies hx of pancreatitis. Last drink was yesterday at 1700. However, had drank 400mL of Fireball everyday prior to yesterday. Denies hx of withdrawal seizures.      Triage Assessment     Row Name 12/17/22 2967       Triage Assessment (Adult)    Airway WDL WDL       Respiratory WDL    Respiratory WDL WDL       Skin Circulation/Temperature WDL    Skin Circulation/Temperature WDL WDL       Cardiac WDL    Cardiac WDL WDL       Peripheral/Neurovascular WDL    Peripheral Neurovascular WDL WDL       Cognitive/Neuro/Behavioral WDL    Cognitive/Neuro/Behavioral WDL WDL              "

## 2022-12-17 NOTE — ED PROVIDER NOTES
History   Chief Complaint:  Abdominal Pain and Withdrawal       The history is provided by the patient.      Giovani Damian is a 42 year old male with history of anxiety, panic, and major depressive disorder who presents with abdominal pain and withdrawal symptoms. Patient has been drinking everyday for about 7 days. He states that his last drink was yesterday at 1700. He states that he has had withdrawals before but no seizures. He has not been vomiting or having diarrhea. He has had abdominal pain and some facial paint that is intermittent. Patient rates the pain a 6/10 for his abdomen and states that it comes and goes. He also has some chest pain and shortness of breath but attributes that to stress. Patient also denies any recent trauma and blood in stool or vomit.     Review of Systems   Respiratory: Positive for shortness of breath.    Cardiovascular: Positive for chest pain.   Gastrointestinal: Positive for abdominal pain. Negative for blood in stool and vomiting.   All other systems reviewed and are negative.      Allergies:  Seasonal allergies    Medications:  Klonopin  Flexeril  Naprosyn  Sertraline  Zoloft    Past Medical History:     Major Depressive disorder  Panic disorder  Anxiety disorder    Past Surgical History:    Mouth procedure    Family History:    Anxiety    Social History:  PCP: [facility] - [facility]   Patient came from home.  Patient is unaccompanied in the ED.  Patient confirms tobacco use in the form of vape  Patient confirms alcohol use everyday for the past 7 days.     Physical Exam     Patient Vitals for the past 24 hrs:   BP Temp Temp src Pulse Resp SpO2   12/17/22 2150 -- -- -- -- -- 96 %   12/17/22 2140 (!) 162/102 -- -- -- -- 96 %   12/17/22 2130 (!) 142/78 -- -- 55 -- --   12/17/22 2100 (!) 137/106 -- -- 87 -- 97 %   12/17/22 2030 -- -- -- -- -- 97 %   12/17/22 1900 (!) 160/104 -- -- 59 -- 97 %   12/17/22 1800 (!) 157/95 -- -- 63 -- 95 %   12/17/22 1730 (!)  170/111 -- -- 86 -- 98 %   12/17/22 1700 (!) 148/107 -- -- -- -- --   12/17/22 1630 (!) 167/106 -- -- 89 -- 99 %   12/17/22 1600 -- -- -- -- -- 98 %   12/17/22 1525 (!) 154/110 97.9  F (36.6  C) Oral 84 18 97 %     Physical Exam  Constitutional: Well developed, nontox appearance  Head: Atraumatic.   Mouth/Throat: Oropharynx is clear and moist.   Neck:  no stridor  Eyes: no scleral icterus  Cardiovascular: RRR, 2+ bilat radial pulses  Pulmonary/Chest: nml resp effort, Clear BS bilat  Abdominal: ND, soft, NT, no rebound or guarding   : no CVA tenderness bilat  Ext: Warm, well perfused, no edema  Neurological: A&O, symmetric facies, moves ext x4  Skin: Skin is warm and dry.   Psychiatric: Behavior is normal. Thought content normal.   Nursing note and vitals reviewed.     Emergency Department Course   Imaging:  CT Abdomen Pelvis w Contrast   Final Result   IMPRESSION:    1.  Inflammation between the proximal duodenum and adjacent pancreatic head. Differential diagnosis favors duodenitis. Groove pancreatitis also a possibility.      CT Head w/o Contrast   Final Result   IMPRESSION:   1.  Normal head CT.      Chest XR,  PA & LAT   Final Result   IMPRESSION: Normal cardiomediastinal silhouette. Subtle, spiculated appearing opacity in the right upper lobe, could represent focus of developing pneumonia, recommend radiographic follow-up to resolution to exclude lung nodule. No pleural effusion or    pneumothorax. No acute bony abnormality.        Report per radiology    Laboratory:  Labs Ordered and Resulted from Time of ED Arrival to Time of ED Departure   COMPREHENSIVE METABOLIC PANEL - Abnormal       Result Value    Sodium 139      Potassium 3.7      Chloride 101      Carbon Dioxide (CO2) 26      Anion Gap 12      Urea Nitrogen 10.5      Creatinine 0.76      Calcium 8.8      Glucose 105 (*)     Alkaline Phosphatase 119      AST 25      ALT 35      Protein Total 6.6      Albumin 4.2      Bilirubin Total 0.5      GFR  Estimate >90     LIPASE - Normal    Lipase 36     ETHYL ALCOHOL LEVEL - Normal    Alcohol ethyl <0.01     MAGNESIUM - Normal    Magnesium 1.7     PHOSPHORUS - Normal    Phosphorus 3.5     TROPONIN T, HIGH SENSITIVITY - Normal    Troponin T, High Sensitivity 6     CBC WITH PLATELETS AND DIFFERENTIAL    WBC Count 7.7      RBC Count 4.74      Hemoglobin 14.5      Hematocrit 43.2      MCV 91      MCH 30.6      MCHC 33.6      RDW 13.1      Platelet Count 307      % Neutrophils 74      % Lymphocytes 20      % Monocytes 5      % Eosinophils 1      % Basophils 0      % Immature Granulocytes 0      NRBCs per 100 WBC 0      Absolute Neutrophils 5.7      Absolute Lymphocytes 1.6      Absolute Monocytes 0.4      Absolute Eosinophils 0.0      Absolute Basophils 0.0      Absolute Immature Granulocytes 0.0      Absolute NRBCs 0.0        Emergency Department Course:     Reviewed:  I reviewed nursing notes, vitals, past medical history and Care Everywhere    Assessments:  1558 I obtained history and examined the patient as noted above.    I rechecked the patient and explained finding.    Interventions:  1631 NS 1000 mL    IV  1632 Ativan 1 mg   IV  1632 Lidocaine 30 mL  PO  1754 Benadryl 25 mg  IV  1754 Compazine 10 mg  IV  2042 Ativan 0.5 mg   IV  2043 Haldol 2 mg   IV    Disposition:  The patient was discharged to home.     Impression & Plan   Medical Decision Makin year old male presenting w/ abdominal pain     DDx includes alcohol withdrawal, alcoholic gastritis, hepatitis, pancreatitis, anxiety, generalized anxiety.  Doubt atypical ACS given patient physical exam.  Labs significant for no remarkable abnormality.  Abdominal imaging deferred initially given unremarkable labs and benign abdominal exam.  Imaging sig for possible duodenitis and findings of possible early pneumonia.  Interventions as noted above with improvement in symptoms.  Antibiotics given for possible pneumonia.  Prescriptions given for outpatient  management.  At this time I feel the pt is safe for discharge.  Recommendations given regarding follow up with PCP and return to the emergency department as needed for new or worsening symptoms.  Pt counseled on all results, disposition and diagnosis.  They are understanding and agreeable to plan. Patient discharged in stable condition.       Diagnosis:    ICD-10-CM    1. Abdominal pain, epigastric  R10.13       2. Alcohol abuse  F10.10           Discharge Medications:  Discharge Medication List as of 12/17/2022  9:56 PM      START taking these medications    Details   amoxicillin-clavulanate (AUGMENTIN) 875-125 MG tablet Take 1 tablet by mouth 2 times daily for 10 days, Disp-20 tablet, R-0, Local PrintOkay to substitute doxycycline 100 mg by mouth twice daily for 7 days quantity 14, no refills if Augmentin is unavailable      gabapentin (NEURONTIN) 300 MG capsule Day 1 - 300 mg every 6 hours  Day 2 - 300 mg every 8 hours  Day 3 - 300 mg every 12 hours  Day 4 - 300 mg one dose, Disp-10 capsule, R-0, Local Print      !! ondansetron (ZOFRAN ODT) 4 MG ODT tab Take 1 tablet (4 mg) by mouth every 6 hours as needed for nausea or vomiting, Disp-20 tablet, R-0, Local Print      !! ondansetron (ZOFRAN ODT) 4 MG ODT tab Take 1 tablet (4 mg) by mouth every 6 hours as needed for nausea or vomiting, Disp-20 tablet, R-0, Local Print      pantoprazole (PROTONIX) 40 MG EC tablet Take 1 tablet (40 mg) by mouth daily for 30 days, Disp-30 tablet, R-0, Local Print       !! - Potential duplicate medications found. Please discuss with provider.        Scribe Disclosure:  IWesley, am serving as a scribe at 3:57 PM on 12/17/2022 to document services personally performed by Maverick Harrington MD based on my observations and the provider's statements to me.            Maverick Harrington MD  12/17/22 7112

## 2023-01-08 ENCOUNTER — HOSPITAL ENCOUNTER (EMERGENCY)
Facility: CLINIC | Age: 43
Discharge: HOME | End: 2023-01-08
Attending: EMERGENCY MEDICINE | Admitting: EMERGENCY MEDICINE
Payer: COMMERCIAL

## 2023-01-08 VITALS
DIASTOLIC BLOOD PRESSURE: 117 MMHG | SYSTOLIC BLOOD PRESSURE: 167 MMHG | RESPIRATION RATE: 16 BRPM | TEMPERATURE: 98.1 F | HEART RATE: 112 BPM | OXYGEN SATURATION: 98 %

## 2023-01-08 DIAGNOSIS — F10.929 ALCOHOLIC INTOXICATION WITH COMPLICATION (H): ICD-10-CM

## 2023-01-08 DIAGNOSIS — R11.2 NAUSEA AND VOMITING, UNSPECIFIED VOMITING TYPE: ICD-10-CM

## 2023-01-08 LAB
ALBUMIN SERPL BCG-MCNC: 4.6 G/DL (ref 3.5–5.2)
ALP SERPL-CCNC: 118 U/L (ref 40–129)
ALT SERPL W P-5'-P-CCNC: 31 U/L (ref 10–50)
ANION GAP SERPL CALCULATED.3IONS-SCNC: 17 MMOL/L (ref 7–15)
AST SERPL W P-5'-P-CCNC: 48 U/L (ref 10–50)
BASOPHILS # BLD AUTO: 0.1 10E3/UL (ref 0–0.2)
BASOPHILS NFR BLD AUTO: 1 %
BILIRUB SERPL-MCNC: 0.8 MG/DL
BUN SERPL-MCNC: 17.7 MG/DL (ref 6–20)
CALCIUM SERPL-MCNC: 8.9 MG/DL (ref 8.6–10)
CHLORIDE SERPL-SCNC: 100 MMOL/L (ref 98–107)
CREAT SERPL-MCNC: 0.73 MG/DL (ref 0.67–1.17)
DEPRECATED HCO3 PLAS-SCNC: 22 MMOL/L (ref 22–29)
EOSINOPHIL # BLD AUTO: 0 10E3/UL (ref 0–0.7)
EOSINOPHIL NFR BLD AUTO: 0 %
ERYTHROCYTE [DISTWIDTH] IN BLOOD BY AUTOMATED COUNT: 13.6 % (ref 10–15)
ETHANOL SERPL-MCNC: 0.2 G/DL
GFR SERPL CREATININE-BSD FRML MDRD: >90 ML/MIN/1.73M2
GLUCOSE SERPL-MCNC: 147 MG/DL (ref 70–99)
HCT VFR BLD AUTO: 51.9 % (ref 40–53)
HGB BLD-MCNC: 17.4 G/DL (ref 13.3–17.7)
IMM GRANULOCYTES # BLD: 0 10E3/UL
IMM GRANULOCYTES NFR BLD: 0 %
LIPASE SERPL-CCNC: 64 U/L (ref 13–60)
LYMPHOCYTES # BLD AUTO: 2.2 10E3/UL (ref 0.8–5.3)
LYMPHOCYTES NFR BLD AUTO: 21 %
MAGNESIUM SERPL-MCNC: 2.1 MG/DL (ref 1.7–2.3)
MCH RBC QN AUTO: 30.5 PG (ref 26.5–33)
MCHC RBC AUTO-ENTMCNC: 33.5 G/DL (ref 31.5–36.5)
MCV RBC AUTO: 91 FL (ref 78–100)
MONOCYTES # BLD AUTO: 0.8 10E3/UL (ref 0–1.3)
MONOCYTES NFR BLD AUTO: 8 %
NEUTROPHILS # BLD AUTO: 7.4 10E3/UL (ref 1.6–8.3)
NEUTROPHILS NFR BLD AUTO: 70 %
NRBC # BLD AUTO: 0 10E3/UL
NRBC BLD AUTO-RTO: 0 /100
PLATELET # BLD AUTO: 436 10E3/UL (ref 150–450)
POTASSIUM SERPL-SCNC: 4 MMOL/L (ref 3.4–5.3)
PROT SERPL-MCNC: 7.2 G/DL (ref 6.4–8.3)
RBC # BLD AUTO: 5.7 10E6/UL (ref 4.4–5.9)
SODIUM SERPL-SCNC: 139 MMOL/L (ref 136–145)
WBC # BLD AUTO: 10.6 10E3/UL (ref 4–11)

## 2023-01-08 PROCEDURE — 250N000011 HC RX IP 250 OP 636: Performed by: EMERGENCY MEDICINE

## 2023-01-08 PROCEDURE — 96374 THER/PROPH/DIAG INJ IV PUSH: CPT

## 2023-01-08 PROCEDURE — 83735 ASSAY OF MAGNESIUM: CPT | Performed by: EMERGENCY MEDICINE

## 2023-01-08 PROCEDURE — 96361 HYDRATE IV INFUSION ADD-ON: CPT

## 2023-01-08 PROCEDURE — 82077 ASSAY SPEC XCP UR&BREATH IA: CPT | Performed by: EMERGENCY MEDICINE

## 2023-01-08 PROCEDURE — 258N000003 HC RX IP 258 OP 636: Performed by: EMERGENCY MEDICINE

## 2023-01-08 PROCEDURE — 99284 EMERGENCY DEPT VISIT MOD MDM: CPT | Mod: 25

## 2023-01-08 PROCEDURE — 85004 AUTOMATED DIFF WBC COUNT: CPT | Performed by: EMERGENCY MEDICINE

## 2023-01-08 PROCEDURE — 83690 ASSAY OF LIPASE: CPT | Performed by: EMERGENCY MEDICINE

## 2023-01-08 PROCEDURE — 36415 COLL VENOUS BLD VENIPUNCTURE: CPT | Performed by: EMERGENCY MEDICINE

## 2023-01-08 PROCEDURE — 250N000013 HC RX MED GY IP 250 OP 250 PS 637: Performed by: EMERGENCY MEDICINE

## 2023-01-08 PROCEDURE — 80053 COMPREHEN METABOLIC PANEL: CPT | Performed by: EMERGENCY MEDICINE

## 2023-01-08 RX ORDER — ONDANSETRON 2 MG/ML
4 INJECTION INTRAMUSCULAR; INTRAVENOUS ONCE
Status: COMPLETED | OUTPATIENT
Start: 2023-01-08 | End: 2023-01-08

## 2023-01-08 RX ORDER — ONDANSETRON 4 MG/1
4 TABLET, ORALLY DISINTEGRATING ORAL EVERY 8 HOURS PRN
Qty: 10 TABLET | Refills: 0 | Status: SHIPPED | OUTPATIENT
Start: 2023-01-08 | End: 2023-01-11

## 2023-01-08 RX ORDER — CHLORDIAZEPOXIDE HYDROCHLORIDE 10 MG/1
10 CAPSULE, GELATIN COATED ORAL ONCE
Status: COMPLETED | OUTPATIENT
Start: 2023-01-08 | End: 2023-01-08

## 2023-01-08 RX ORDER — SODIUM CHLORIDE 9 MG/ML
INJECTION, SOLUTION INTRAVENOUS CONTINUOUS
Status: DISCONTINUED | OUTPATIENT
Start: 2023-01-08 | End: 2023-01-08 | Stop reason: HOSPADM

## 2023-01-08 RX ORDER — CHLORDIAZEPOXIDE HYDROCHLORIDE 10 MG/1
10 CAPSULE, GELATIN COATED ORAL 3 TIMES DAILY PRN
Qty: 20 CAPSULE | Refills: 0 | Status: SHIPPED | OUTPATIENT
Start: 2023-01-08 | End: 2023-01-18

## 2023-01-08 RX ADMIN — ONDANSETRON 4 MG: 2 INJECTION INTRAMUSCULAR; INTRAVENOUS at 11:31

## 2023-01-08 RX ADMIN — SODIUM CHLORIDE 1000 ML: 9 INJECTION, SOLUTION INTRAVENOUS at 11:31

## 2023-01-08 RX ADMIN — CHLORDIAZEPOXIDE HYDROCHLORIDE 10 MG: 10 CAPSULE ORAL at 11:34

## 2023-01-08 ASSESSMENT — ENCOUNTER SYMPTOMS
NAUSEA: 1
VOMITING: 1

## 2023-01-08 ASSESSMENT — ACTIVITIES OF DAILY LIVING (ADL): ADLS_ACUITY_SCORE: 35

## 2023-01-08 NOTE — ED PROVIDER NOTES
"    History     Chief Complaint:  Alcohol Problem     The history is provided by the patient.      Giovani Damian is a 42 year old male with history of alcohol abuse, substance abuse, major depressive disorder, and adjustment disorder who presents with nausea, shaking, and vomiting and notes he is \"detoxing from alcohol.\" He denies diarrhea. Patient requests Ativan. He denies drug use. Patient arrived via [employer]. Patient notes he was sober for 7 years and relapsed twice in past two weeks. Last drink of alcohol was yesterday at 5 pm (18 hours ago). Patient is unsure if he has had alcohol withdrawal seizures in the past. Patient notes he lost his brother to alcohol related complications.      Independent Historian: Yes     Review of External Notes: Patient was seen in ED last month for abdominal pain and alcohol abuse (12/17/2022).     ROS:  Review of Systems   Constitutional:        + EtOH complication   Gastrointestinal: Positive for nausea and vomiting.   Neurological:        + shaking   All other systems reviewed and are negative.        Allergies:  Seasonal Allergies     Medications:    Clonazepam   Cyclobenzaprine   Gabapentin   Ondansetron   Pantoprazole   Sertraline     Past Medical History:    Anxiety  Substance abuse  Major depressive disorder  Panic disorder  Rib fracture  Dissociative fugue  Alcohol abuse  Adjustment disorder    Past Surgical History:    Mouth surgery   Wrist fracture treatment  Jaw fracture  Procedure closed rib fracture    Family History:    Family history includes No Known Problems in his brother, father, maternal grandfather, maternal grandmother, mother, paternal grandfather, and paternal grandmother.  Father- anxiety, hypertension, psychiatric illness    Social History:  He reports that he has quit smoking. His smoking use included dip, chew, snus or snuff. He smoked an average of 1 pack per day. He uses smokeless tobacco. He reports current alcohol use. He reports current drug use. " Drug: Marijuana.  Patient presents alone.  Patient arrived via [employer].   PCP: No Ref-Primary, Physician     Physical Exam     Patient Vitals for the past 24 hrs:   BP Temp Pulse Resp SpO2   01/08/23 1151 -- -- 112 16 98 %   01/08/23 0938 (!) 167/117 98.1  F (36.7  C) (!) 136 18 97 %        Physical Exam  Vitals and nursing note reviewed.   HENT:      Head: Normocephalic.      Right Ear: Tympanic membrane normal.      Left Ear: Tympanic membrane normal.      Nose: Nose normal.      Mouth/Throat:      Mouth: Mucous membranes are moist.   Eyes:      General: No scleral icterus.     Pupils: Pupils are equal, round, and reactive to light.   Cardiovascular:      Rate and Rhythm: Normal rate and regular rhythm.   Pulmonary:      Effort: Pulmonary effort is normal.      Breath sounds: Normal breath sounds.   Abdominal:      General: Abdomen is flat.      Palpations: Abdomen is soft.   Musculoskeletal:         General: Normal range of motion.   Skin:     General: Skin is warm.      Capillary Refill: Capillary refill takes less than 2 seconds.   Neurological:      General: No focal deficit present.      Mental Status: He is alert and oriented to person, place, and time.   Psychiatric:         Mood and Affect: Mood normal.      Comments: Anxious requesting help from detox and alcohol.           Emergency Department Course     Laboratory:  Labs Ordered and Resulted from Time of ED Arrival to Time of ED Departure   COMPREHENSIVE METABOLIC PANEL - Abnormal       Result Value    Sodium 139      Potassium 4.0      Chloride 100      Carbon Dioxide (CO2) 22      Anion Gap 17 (*)     Urea Nitrogen 17.7      Creatinine 0.73      Calcium 8.9      Glucose 147 (*)     Alkaline Phosphatase 118      AST 48      ALT 31      Protein Total 7.2      Albumin 4.6      Bilirubin Total 0.8      GFR Estimate >90     LIPASE - Abnormal    Lipase 64 (*)    ETHYL ALCOHOL LEVEL - Abnormal    Alcohol ethyl 0.20 (*)    MAGNESIUM - Normal    Magnesium 2.1      CBC WITH PLATELETS AND DIFFERENTIAL    WBC Count 10.6      RBC Count 5.70      Hemoglobin 17.4      Hematocrit 51.9      MCV 91      MCH 30.5      MCHC 33.5      RDW 13.6      Platelet Count 436      % Neutrophils 70      % Lymphocytes 21      % Monocytes 8      % Eosinophils 0      % Basophils 1      % Immature Granulocytes 0      NRBCs per 100 WBC 0      Absolute Neutrophils 7.4      Absolute Lymphocytes 2.2      Absolute Monocytes 0.8      Absolute Eosinophils 0.0      Absolute Basophils 0.1      Absolute Immature Granulocytes 0.0      Absolute NRBCs 0.0        Emergency Department Course & Assessments:    Interventions:  Medications   0.9% sodium chloride BOLUS (1,000 mLs Intravenous New Bag 1/8/23 1131)     Followed by   sodium chloride 0.9% infusion (has no administration in time range)   chlordiazePOXIDE (LIBRIUM) capsule 10 mg (10 mg Oral Given 1/8/23 1134)   ondansetron (ZOFRAN) injection 4 mg (4 mg Intravenous Given 1/8/23 1131)        Independent Interpretation (X-rays, CTs, rhythm strip):      Consultations/Discussion of Management or Tests:   ED Course as of 01/08/23 1227   Sun Jan 08, 2023   1108 I obtained patient's history and examined as noted above.    1108 I rechecked the patient and explained findings.        Social Determinants of Health affecting care:  Patient notes he was sober for seven years and has relapsed twice in the past two weeks.     Disposition:  The patient was discharged to home.     Impression & Plan      Medical Decision Making:  Patient presents with request for help for alcohol and detox.  Patient states he is withdrawing but in order noted to be only slightly hypertensive and not significantly tachycardic and not tremulous.  Due to history of alcoholism lab work was entertained no signs of electrolyte imbalance sodium is normal blood sugar is normal liver function test normal and lipase is normal.  Patient's alcohol level is +0.2.  Care was discussed with the patient and  seems quite focused on getting Klonopin for his detox and his alcoholism.  Patient is advised that benzodiazepines are not the recommended treatment at this time we did discuss gabapentin for treatment for alcohol withdrawal patient refuses this we did discuss phenobarbital patient refuses this as well.  Patient was offered a short course of Librium but see no need for admission patient is not suicidal in the minute patient was offered Librium patient requested to leave the emergency room as we do not offer to refill his Klonopin.  I suspect benzodiazepine seeking in the setting of chronic alcohol abuse patient was discharged to home in stable condition.    Diagnosis:    ICD-10-CM    1. Alcoholic intoxication with complication (H)  F10.929       2. Nausea and vomiting, unspecified vomiting type  R11.2              ICD-10-CM    1. Alcoholic intoxication with complication (H)  F10.929       2. Nausea and vomiting, unspecified vomiting type  R11.2         Discharge Medications:  New Prescriptions    CHLORDIAZEPOXIDE (LIBRIUM) 10 MG CAPSULE    Take 1 capsule (10 mg) by mouth 3 times daily as needed for anxiety    ONDANSETRON (ZOFRAN ODT) 4 MG ODT TAB    Take 1 tablet (4 mg) by mouth every 8 hours as needed for nausea        Scribe Disclosure:  I, Patti Mercedes, am serving as a scribe at 11:07 AM on 1/8/2023 to document services personally performed by Mehul Gandhi MD based on my observations and the provider's statements to me.     1/8/2023   Mehul Gandhi MD Goodman, Brian Samuel, MD  01/17/23 2017

## 2023-01-08 NOTE — DISCHARGE INSTRUCTIONS
We highly recommend avoiding alcohol.  We are offering you Librium to use for alcohol withdrawal but your alcohol level today is 0.2.  Please follow-up with your regular doctor.  We do not recommend long-term benzodiazepine use as these are also addictive.  Please follow-up with chemical dependency resources that you have to discuss long-term avoidance of alcohol and chemical dependency treatment.  Return to the emergency room if you feel your alcohol withdrawal is worse.

## 2023-01-08 NOTE — ED TRIAGE NOTES
Patient presents to the ED stating that he is detoxing from alcohol. Reports last drink was 1700 yesterday. States is shaky and nauseated.

## 2023-01-17 ENCOUNTER — HOSPITAL ENCOUNTER (EMERGENCY)
Facility: CLINIC | Age: 43
Discharge: HOME | End: 2023-01-17
Payer: COMMERCIAL

## 2023-01-17 VITALS
WEIGHT: 175 LBS | RESPIRATION RATE: 18 BRPM | BODY MASS INDEX: 29.16 KG/M2 | DIASTOLIC BLOOD PRESSURE: 97 MMHG | OXYGEN SATURATION: 95 % | SYSTOLIC BLOOD PRESSURE: 151 MMHG | TEMPERATURE: 98.6 F | HEART RATE: 119 BPM | HEIGHT: 65 IN

## 2023-01-18 ENCOUNTER — HOSPITAL ENCOUNTER (EMERGENCY)
Facility: CLINIC | Age: 43
Discharge: LEFT BEFORE DISCHARGE | DRG: 894 | End: 2023-01-18
Attending: EMERGENCY MEDICINE | Admitting: EMERGENCY MEDICINE
Payer: COMMERCIAL

## 2023-01-18 ENCOUNTER — APPOINTMENT (OUTPATIENT)
Dept: CT IMAGING | Facility: CLINIC | Age: 43
DRG: 894 | End: 2023-01-18
Attending: EMERGENCY MEDICINE
Payer: COMMERCIAL

## 2023-01-18 ENCOUNTER — HOSPITAL ENCOUNTER (INPATIENT)
Facility: CLINIC | Age: 43
LOS: 1 days | Discharge: LEFT BEFORE DISCHARGE | DRG: 894 | End: 2023-01-18
Attending: EMERGENCY MEDICINE | Admitting: STUDENT IN AN ORGANIZED HEALTH CARE EDUCATION/TRAINING PROGRAM
Payer: COMMERCIAL

## 2023-01-18 ENCOUNTER — APPOINTMENT (OUTPATIENT)
Dept: GENERAL RADIOLOGY | Facility: CLINIC | Age: 43
DRG: 894 | End: 2023-01-18
Attending: EMERGENCY MEDICINE
Payer: COMMERCIAL

## 2023-01-18 VITALS
BODY MASS INDEX: 29.12 KG/M2 | HEART RATE: 99 BPM | OXYGEN SATURATION: 98 % | TEMPERATURE: 98.8 F | SYSTOLIC BLOOD PRESSURE: 147 MMHG | DIASTOLIC BLOOD PRESSURE: 101 MMHG | HEIGHT: 65 IN | RESPIRATION RATE: 18 BRPM

## 2023-01-18 VITALS
HEART RATE: 103 BPM | DIASTOLIC BLOOD PRESSURE: 92 MMHG | SYSTOLIC BLOOD PRESSURE: 142 MMHG | RESPIRATION RATE: 20 BRPM | TEMPERATURE: 97.8 F | OXYGEN SATURATION: 100 %

## 2023-01-18 DIAGNOSIS — M79.641 PAIN OF RIGHT HAND: ICD-10-CM

## 2023-01-18 DIAGNOSIS — F10.930 ALCOHOL WITHDRAWAL SYNDROME WITHOUT COMPLICATION (H): ICD-10-CM

## 2023-01-18 DIAGNOSIS — E83.42 HYPOMAGNESEMIA: ICD-10-CM

## 2023-01-18 LAB
ALBUMIN SERPL BCG-MCNC: 3.9 G/DL (ref 3.5–5.2)
ALBUMIN SERPL BCG-MCNC: 4 G/DL (ref 3.5–5.2)
ALP SERPL-CCNC: 95 U/L (ref 40–129)
ALP SERPL-CCNC: 98 U/L (ref 40–129)
ALT SERPL W P-5'-P-CCNC: 51 U/L (ref 10–50)
ALT SERPL W P-5'-P-CCNC: 54 U/L (ref 10–50)
ANION GAP SERPL CALCULATED.3IONS-SCNC: 12 MMOL/L (ref 7–15)
ANION GAP SERPL CALCULATED.3IONS-SCNC: 14 MMOL/L (ref 7–15)
ANION GAP SERPL CALCULATED.3IONS-SCNC: 15 MMOL/L (ref 7–15)
AST SERPL W P-5'-P-CCNC: 62 U/L (ref 10–50)
AST SERPL W P-5'-P-CCNC: 71 U/L (ref 10–50)
BASOPHILS # BLD AUTO: 0 10E3/UL (ref 0–0.2)
BASOPHILS # BLD AUTO: 0.1 10E3/UL (ref 0–0.2)
BASOPHILS NFR BLD AUTO: 0 %
BASOPHILS NFR BLD AUTO: 1 %
BILIRUB SERPL-MCNC: 0.3 MG/DL
BILIRUB SERPL-MCNC: 0.7 MG/DL
BUN SERPL-MCNC: 6.8 MG/DL (ref 6–20)
BUN SERPL-MCNC: 7.3 MG/DL (ref 6–20)
BUN SERPL-MCNC: 8.5 MG/DL (ref 6–20)
CALCIUM SERPL-MCNC: 7.8 MG/DL (ref 8.6–10)
CALCIUM SERPL-MCNC: 7.8 MG/DL (ref 8.6–10)
CALCIUM SERPL-MCNC: 8.1 MG/DL (ref 8.6–10)
CHLORIDE SERPL-SCNC: 101 MMOL/L (ref 98–107)
CHLORIDE SERPL-SCNC: 103 MMOL/L (ref 98–107)
CHLORIDE SERPL-SCNC: 98 MMOL/L (ref 98–107)
CREAT SERPL-MCNC: 0.77 MG/DL (ref 0.67–1.17)
CREAT SERPL-MCNC: 0.77 MG/DL (ref 0.67–1.17)
CREAT SERPL-MCNC: 0.79 MG/DL (ref 0.67–1.17)
DEPRECATED HCO3 PLAS-SCNC: 23 MMOL/L (ref 22–29)
DEPRECATED HCO3 PLAS-SCNC: 24 MMOL/L (ref 22–29)
DEPRECATED HCO3 PLAS-SCNC: 25 MMOL/L (ref 22–29)
EOSINOPHIL # BLD AUTO: 0 10E3/UL (ref 0–0.7)
EOSINOPHIL # BLD AUTO: 0 10E3/UL (ref 0–0.7)
EOSINOPHIL NFR BLD AUTO: 0 %
EOSINOPHIL NFR BLD AUTO: 0 %
ERYTHROCYTE [DISTWIDTH] IN BLOOD BY AUTOMATED COUNT: 13.4 % (ref 10–15)
ERYTHROCYTE [DISTWIDTH] IN BLOOD BY AUTOMATED COUNT: 13.5 % (ref 10–15)
ERYTHROCYTE [DISTWIDTH] IN BLOOD BY AUTOMATED COUNT: 13.7 % (ref 10–15)
ETHANOL SERPL-MCNC: 0.25 G/DL
ETHANOL SERPL-MCNC: <0.01 G/DL
GFR SERPL CREATININE-BSD FRML MDRD: >90 ML/MIN/1.73M2
GLUCOSE SERPL-MCNC: 90 MG/DL (ref 70–99)
GLUCOSE SERPL-MCNC: 94 MG/DL (ref 70–99)
GLUCOSE SERPL-MCNC: 97 MG/DL (ref 70–99)
HCT VFR BLD AUTO: 37.3 % (ref 40–53)
HCT VFR BLD AUTO: 38 % (ref 40–53)
HCT VFR BLD AUTO: 39.9 % (ref 40–53)
HGB BLD-MCNC: 12.4 G/DL (ref 13.3–17.7)
HGB BLD-MCNC: 13 G/DL (ref 13.3–17.7)
HGB BLD-MCNC: 13.6 G/DL (ref 13.3–17.7)
HOLD SPECIMEN: NORMAL
IMM GRANULOCYTES # BLD: 0 10E3/UL
IMM GRANULOCYTES # BLD: 0 10E3/UL
IMM GRANULOCYTES NFR BLD: 0 %
IMM GRANULOCYTES NFR BLD: 0 %
INR PPP: 0.99 (ref 0.85–1.15)
LIPASE SERPL-CCNC: 75 U/L (ref 13–60)
LYMPHOCYTES # BLD AUTO: 1.5 10E3/UL (ref 0.8–5.3)
LYMPHOCYTES # BLD AUTO: 3.5 10E3/UL (ref 0.8–5.3)
LYMPHOCYTES NFR BLD AUTO: 22 %
LYMPHOCYTES NFR BLD AUTO: 36 %
MAGNESIUM SERPL-MCNC: 1.4 MG/DL (ref 1.7–2.3)
MAGNESIUM SERPL-MCNC: 1.7 MG/DL (ref 1.7–2.3)
MAGNESIUM SERPL-MCNC: 1.8 MG/DL (ref 1.7–2.3)
MCH RBC QN AUTO: 30.8 PG (ref 26.5–33)
MCH RBC QN AUTO: 30.8 PG (ref 26.5–33)
MCH RBC QN AUTO: 31.1 PG (ref 26.5–33)
MCHC RBC AUTO-ENTMCNC: 33.2 G/DL (ref 31.5–36.5)
MCHC RBC AUTO-ENTMCNC: 34.1 G/DL (ref 31.5–36.5)
MCHC RBC AUTO-ENTMCNC: 34.2 G/DL (ref 31.5–36.5)
MCV RBC AUTO: 90 FL (ref 78–100)
MCV RBC AUTO: 91 FL (ref 78–100)
MCV RBC AUTO: 93 FL (ref 78–100)
MONOCYTES # BLD AUTO: 0.6 10E3/UL (ref 0–1.3)
MONOCYTES # BLD AUTO: 0.8 10E3/UL (ref 0–1.3)
MONOCYTES NFR BLD AUTO: 8 %
MONOCYTES NFR BLD AUTO: 8 %
NEUTROPHILS # BLD AUTO: 4.7 10E3/UL (ref 1.6–8.3)
NEUTROPHILS # BLD AUTO: 5.3 10E3/UL (ref 1.6–8.3)
NEUTROPHILS NFR BLD AUTO: 55 %
NEUTROPHILS NFR BLD AUTO: 70 %
NRBC # BLD AUTO: 0 10E3/UL
NRBC # BLD AUTO: 0 10E3/UL
NRBC BLD AUTO-RTO: 0 /100
NRBC BLD AUTO-RTO: 0 /100
PHOSPHATE SERPL-MCNC: 2.2 MG/DL (ref 2.5–4.5)
PHOSPHATE SERPL-MCNC: 3.2 MG/DL (ref 2.5–4.5)
PLATELET # BLD AUTO: 246 10E3/UL (ref 150–450)
PLATELET # BLD AUTO: 275 10E3/UL (ref 150–450)
PLATELET # BLD AUTO: 294 10E3/UL (ref 150–450)
POTASSIUM SERPL-SCNC: 3.2 MMOL/L (ref 3.4–5.3)
POTASSIUM SERPL-SCNC: 3.2 MMOL/L (ref 3.4–5.3)
POTASSIUM SERPL-SCNC: 3.6 MMOL/L (ref 3.4–5.3)
PROT SERPL-MCNC: 6 G/DL (ref 6.4–8.3)
PROT SERPL-MCNC: 6.3 G/DL (ref 6.4–8.3)
RBC # BLD AUTO: 4.03 10E6/UL (ref 4.4–5.9)
RBC # BLD AUTO: 4.18 10E6/UL (ref 4.4–5.9)
RBC # BLD AUTO: 4.42 10E6/UL (ref 4.4–5.9)
SODIUM SERPL-SCNC: 137 MMOL/L (ref 136–145)
SODIUM SERPL-SCNC: 138 MMOL/L (ref 136–145)
SODIUM SERPL-SCNC: 140 MMOL/L (ref 136–145)
WBC # BLD AUTO: 6.9 10E3/UL (ref 4–11)
WBC # BLD AUTO: 7.6 10E3/UL (ref 4–11)
WBC # BLD AUTO: 9.7 10E3/UL (ref 4–11)

## 2023-01-18 PROCEDURE — 258N000003 HC RX IP 258 OP 636: Performed by: EMERGENCY MEDICINE

## 2023-01-18 PROCEDURE — 85610 PROTHROMBIN TIME: CPT | Performed by: EMERGENCY MEDICINE

## 2023-01-18 PROCEDURE — 85025 COMPLETE CBC W/AUTO DIFF WBC: CPT | Performed by: STUDENT IN AN ORGANIZED HEALTH CARE EDUCATION/TRAINING PROGRAM

## 2023-01-18 PROCEDURE — 250N000011 HC RX IP 250 OP 636: Performed by: EMERGENCY MEDICINE

## 2023-01-18 PROCEDURE — 83690 ASSAY OF LIPASE: CPT | Performed by: EMERGENCY MEDICINE

## 2023-01-18 PROCEDURE — 80053 COMPREHEN METABOLIC PANEL: CPT | Performed by: EMERGENCY MEDICINE

## 2023-01-18 PROCEDURE — 250N000011 HC RX IP 250 OP 636: Performed by: STUDENT IN AN ORGANIZED HEALTH CARE EDUCATION/TRAINING PROGRAM

## 2023-01-18 PROCEDURE — 99285 EMERGENCY DEPT VISIT HI MDM: CPT

## 2023-01-18 PROCEDURE — 99285 EMERGENCY DEPT VISIT HI MDM: CPT | Mod: 25

## 2023-01-18 PROCEDURE — 83735 ASSAY OF MAGNESIUM: CPT | Performed by: EMERGENCY MEDICINE

## 2023-01-18 PROCEDURE — 250N000013 HC RX MED GY IP 250 OP 250 PS 637: Performed by: EMERGENCY MEDICINE

## 2023-01-18 PROCEDURE — 120N000001 HC R&B MED SURG/OB

## 2023-01-18 PROCEDURE — 73130 X-RAY EXAM OF HAND: CPT | Mod: RT

## 2023-01-18 PROCEDURE — 82077 ASSAY SPEC XCP UR&BREATH IA: CPT | Performed by: EMERGENCY MEDICINE

## 2023-01-18 PROCEDURE — 96361 HYDRATE IV INFUSION ADD-ON: CPT

## 2023-01-18 PROCEDURE — 84155 ASSAY OF PROTEIN SERUM: CPT | Performed by: EMERGENCY MEDICINE

## 2023-01-18 PROCEDURE — HZ2ZZZZ DETOXIFICATION SERVICES FOR SUBSTANCE ABUSE TREATMENT: ICD-10-PCS | Performed by: STUDENT IN AN ORGANIZED HEALTH CARE EDUCATION/TRAINING PROGRAM

## 2023-01-18 PROCEDURE — 250N000011 HC RX IP 250 OP 636: Performed by: HOSPITALIST

## 2023-01-18 PROCEDURE — 36415 COLL VENOUS BLD VENIPUNCTURE: CPT | Performed by: EMERGENCY MEDICINE

## 2023-01-18 PROCEDURE — 84100 ASSAY OF PHOSPHORUS: CPT | Performed by: EMERGENCY MEDICINE

## 2023-01-18 PROCEDURE — 84100 ASSAY OF PHOSPHORUS: CPT | Performed by: STUDENT IN AN ORGANIZED HEALTH CARE EDUCATION/TRAINING PROGRAM

## 2023-01-18 PROCEDURE — 96374 THER/PROPH/DIAG INJ IV PUSH: CPT

## 2023-01-18 PROCEDURE — 70450 CT HEAD/BRAIN W/O DYE: CPT

## 2023-01-18 PROCEDURE — 250N000013 HC RX MED GY IP 250 OP 250 PS 637: Performed by: HOSPITALIST

## 2023-01-18 PROCEDURE — 85027 COMPLETE CBC AUTOMATED: CPT | Performed by: EMERGENCY MEDICINE

## 2023-01-18 PROCEDURE — 258N000003 HC RX IP 258 OP 636: Performed by: STUDENT IN AN ORGANIZED HEALTH CARE EDUCATION/TRAINING PROGRAM

## 2023-01-18 PROCEDURE — 99223 1ST HOSP IP/OBS HIGH 75: CPT | Mod: AI | Performed by: STUDENT IN AN ORGANIZED HEALTH CARE EDUCATION/TRAINING PROGRAM

## 2023-01-18 PROCEDURE — 83735 ASSAY OF MAGNESIUM: CPT | Performed by: STUDENT IN AN ORGANIZED HEALTH CARE EDUCATION/TRAINING PROGRAM

## 2023-01-18 PROCEDURE — 85025 COMPLETE CBC W/AUTO DIFF WBC: CPT | Performed by: EMERGENCY MEDICINE

## 2023-01-18 PROCEDURE — 36415 COLL VENOUS BLD VENIPUNCTURE: CPT | Performed by: STUDENT IN AN ORGANIZED HEALTH CARE EDUCATION/TRAINING PROGRAM

## 2023-01-18 RX ORDER — LIDOCAINE 40 MG/G
CREAM TOPICAL
Status: DISCONTINUED | OUTPATIENT
Start: 2023-01-18 | End: 2023-01-18 | Stop reason: HOSPADM

## 2023-01-18 RX ORDER — LIDOCAINE 40 MG/G
CREAM TOPICAL
Status: CANCELLED | OUTPATIENT
Start: 2023-01-18

## 2023-01-18 RX ORDER — AMOXICILLIN 250 MG
2 CAPSULE ORAL 2 TIMES DAILY PRN
Status: DISCONTINUED | OUTPATIENT
Start: 2023-01-18 | End: 2023-01-18 | Stop reason: HOSPADM

## 2023-01-18 RX ORDER — CLONIDINE HYDROCHLORIDE 0.1 MG/1
0.1 TABLET ORAL EVERY 8 HOURS
Status: CANCELLED | OUTPATIENT
Start: 2023-01-18

## 2023-01-18 RX ORDER — ONDANSETRON 4 MG/1
4 TABLET, ORALLY DISINTEGRATING ORAL EVERY 8 HOURS PRN
Qty: 10 TABLET | Refills: 0 | Status: SHIPPED | OUTPATIENT
Start: 2023-01-18 | End: 2023-01-21

## 2023-01-18 RX ORDER — FLUMAZENIL 0.1 MG/ML
0.2 INJECTION, SOLUTION INTRAVENOUS
Status: CANCELLED | OUTPATIENT
Start: 2023-01-18

## 2023-01-18 RX ORDER — MULTIPLE VITAMINS W/ MINERALS TAB 9MG-400MCG
1 TAB ORAL DAILY
Status: DISCONTINUED | OUTPATIENT
Start: 2023-01-18 | End: 2023-01-18 | Stop reason: HOSPADM

## 2023-01-18 RX ORDER — LORAZEPAM 1 MG/1
1-2 TABLET ORAL EVERY 30 MIN PRN
Status: CANCELLED | OUTPATIENT
Start: 2023-01-18

## 2023-01-18 RX ORDER — LORAZEPAM 2 MG/ML
1 INJECTION INTRAMUSCULAR ONCE
Status: DISCONTINUED | OUTPATIENT
Start: 2023-01-18 | End: 2023-01-18

## 2023-01-18 RX ORDER — AMOXICILLIN 250 MG
1 CAPSULE ORAL 2 TIMES DAILY PRN
Status: DISCONTINUED | OUTPATIENT
Start: 2023-01-18 | End: 2023-01-18 | Stop reason: HOSPADM

## 2023-01-18 RX ORDER — ONDANSETRON 4 MG/1
4 TABLET, ORALLY DISINTEGRATING ORAL EVERY 6 HOURS PRN
Status: CANCELLED | OUTPATIENT
Start: 2023-01-18

## 2023-01-18 RX ORDER — FLUMAZENIL 0.1 MG/ML
0.2 INJECTION, SOLUTION INTRAVENOUS
Status: DISCONTINUED | OUTPATIENT
Start: 2023-01-18 | End: 2023-01-18 | Stop reason: HOSPADM

## 2023-01-18 RX ORDER — SODIUM CHLORIDE, SODIUM LACTATE, POTASSIUM CHLORIDE, CALCIUM CHLORIDE 600; 310; 30; 20 MG/100ML; MG/100ML; MG/100ML; MG/100ML
INJECTION, SOLUTION INTRAVENOUS CONTINUOUS
Status: CANCELLED | OUTPATIENT
Start: 2023-01-18

## 2023-01-18 RX ORDER — GABAPENTIN 600 MG/1
1200 TABLET ORAL ONCE
Status: COMPLETED | OUTPATIENT
Start: 2023-01-18 | End: 2023-01-18

## 2023-01-18 RX ORDER — ACETAMINOPHEN 650 MG/1
650 SUPPOSITORY RECTAL EVERY 6 HOURS PRN
Status: DISCONTINUED | OUTPATIENT
Start: 2023-01-18 | End: 2023-01-18 | Stop reason: HOSPADM

## 2023-01-18 RX ORDER — SODIUM CHLORIDE, SODIUM LACTATE, POTASSIUM CHLORIDE, CALCIUM CHLORIDE 600; 310; 30; 20 MG/100ML; MG/100ML; MG/100ML; MG/100ML
INJECTION, SOLUTION INTRAVENOUS CONTINUOUS
Status: DISCONTINUED | OUTPATIENT
Start: 2023-01-18 | End: 2023-01-18 | Stop reason: HOSPADM

## 2023-01-18 RX ORDER — LORAZEPAM 2 MG/ML
1-2 INJECTION INTRAMUSCULAR EVERY 30 MIN PRN
Status: DISCONTINUED | OUTPATIENT
Start: 2023-01-18 | End: 2023-01-18 | Stop reason: HOSPADM

## 2023-01-18 RX ORDER — CHLORDIAZEPOXIDE HYDROCHLORIDE 25 MG/1
25 CAPSULE, GELATIN COATED ORAL 3 TIMES DAILY PRN
Qty: 15 CAPSULE | Refills: 0 | Status: SHIPPED | OUTPATIENT
Start: 2023-01-18 | End: 2023-08-16

## 2023-01-18 RX ORDER — DIAZEPAM 5 MG
10 TABLET ORAL EVERY 30 MIN PRN
Status: DISCONTINUED | OUTPATIENT
Start: 2023-01-18 | End: 2023-01-18

## 2023-01-18 RX ORDER — NICOTINE 21 MG/24HR
1 PATCH, TRANSDERMAL 24 HOURS TRANSDERMAL DAILY
Status: DISCONTINUED | OUTPATIENT
Start: 2023-01-18 | End: 2023-01-18 | Stop reason: HOSPADM

## 2023-01-18 RX ORDER — LORAZEPAM 1 MG/1
1-2 TABLET ORAL EVERY 30 MIN PRN
Status: DISCONTINUED | OUTPATIENT
Start: 2023-01-18 | End: 2023-01-18 | Stop reason: HOSPADM

## 2023-01-18 RX ORDER — DIAZEPAM 10 MG/2ML
5-10 INJECTION, SOLUTION INTRAMUSCULAR; INTRAVENOUS EVERY 30 MIN PRN
Status: DISCONTINUED | OUTPATIENT
Start: 2023-01-18 | End: 2023-01-18

## 2023-01-18 RX ORDER — ENOXAPARIN SODIUM 100 MG/ML
40 INJECTION SUBCUTANEOUS EVERY 24 HOURS
Status: DISCONTINUED | OUTPATIENT
Start: 2023-01-18 | End: 2023-01-18 | Stop reason: HOSPADM

## 2023-01-18 RX ORDER — LORAZEPAM 2 MG/ML
1-2 INJECTION INTRAMUSCULAR EVERY 30 MIN PRN
Status: CANCELLED | OUTPATIENT
Start: 2023-01-18

## 2023-01-18 RX ORDER — HALOPERIDOL 5 MG/ML
2.5-5 INJECTION INTRAMUSCULAR EVERY 6 HOURS PRN
Status: DISCONTINUED | OUTPATIENT
Start: 2023-01-18 | End: 2023-01-18 | Stop reason: HOSPADM

## 2023-01-18 RX ORDER — FOLIC ACID 1 MG/1
1 TABLET ORAL DAILY
Status: DISCONTINUED | OUTPATIENT
Start: 2023-01-18 | End: 2023-01-18 | Stop reason: HOSPADM

## 2023-01-18 RX ORDER — LORAZEPAM 2 MG/ML
2 INJECTION INTRAMUSCULAR ONCE
Status: COMPLETED | OUTPATIENT
Start: 2023-01-18 | End: 2023-01-18

## 2023-01-18 RX ORDER — POTASSIUM CHLORIDE 1500 MG/1
40 TABLET, EXTENDED RELEASE ORAL ONCE
Status: COMPLETED | OUTPATIENT
Start: 2023-01-18 | End: 2023-01-18

## 2023-01-18 RX ORDER — ACETAMINOPHEN 650 MG/1
650 SUPPOSITORY RECTAL EVERY 6 HOURS PRN
Status: CANCELLED | OUTPATIENT
Start: 2023-01-18

## 2023-01-18 RX ORDER — OLANZAPINE 5 MG/1
5-10 TABLET, ORALLY DISINTEGRATING ORAL EVERY 6 HOURS PRN
Status: DISCONTINUED | OUTPATIENT
Start: 2023-01-18 | End: 2023-01-18 | Stop reason: HOSPADM

## 2023-01-18 RX ORDER — ONDANSETRON 2 MG/ML
4 INJECTION INTRAMUSCULAR; INTRAVENOUS EVERY 6 HOURS PRN
Status: CANCELLED | OUTPATIENT
Start: 2023-01-18

## 2023-01-18 RX ORDER — OLANZAPINE 5 MG/1
5-10 TABLET, ORALLY DISINTEGRATING ORAL EVERY 6 HOURS PRN
Status: CANCELLED | OUTPATIENT
Start: 2023-01-18

## 2023-01-18 RX ORDER — MULTIPLE VITAMINS W/ MINERALS TAB 9MG-400MCG
1 TAB ORAL DAILY
Status: CANCELLED | OUTPATIENT
Start: 2023-01-18

## 2023-01-18 RX ORDER — HALOPERIDOL 5 MG/ML
2.5-5 INJECTION INTRAMUSCULAR EVERY 6 HOURS PRN
Status: CANCELLED | OUTPATIENT
Start: 2023-01-18

## 2023-01-18 RX ORDER — ACETAMINOPHEN 325 MG/1
650 TABLET ORAL EVERY 6 HOURS PRN
Status: DISCONTINUED | OUTPATIENT
Start: 2023-01-18 | End: 2023-01-18 | Stop reason: HOSPADM

## 2023-01-18 RX ORDER — ACETAMINOPHEN 325 MG/1
650 TABLET ORAL EVERY 6 HOURS PRN
Status: CANCELLED | OUTPATIENT
Start: 2023-01-18

## 2023-01-18 RX ORDER — ONDANSETRON 2 MG/ML
4 INJECTION INTRAMUSCULAR; INTRAVENOUS EVERY 6 HOURS PRN
Status: DISCONTINUED | OUTPATIENT
Start: 2023-01-18 | End: 2023-01-18 | Stop reason: HOSPADM

## 2023-01-18 RX ORDER — FOLIC ACID 1 MG/1
1 TABLET ORAL DAILY
Status: CANCELLED | OUTPATIENT
Start: 2023-01-18

## 2023-01-18 RX ORDER — ONDANSETRON 2 MG/ML
4 INJECTION INTRAMUSCULAR; INTRAVENOUS EVERY 30 MIN PRN
Status: DISCONTINUED | OUTPATIENT
Start: 2023-01-18 | End: 2023-01-18 | Stop reason: HOSPADM

## 2023-01-18 RX ORDER — DIAZEPAM 5 MG
10 TABLET ORAL ONCE
Status: COMPLETED | OUTPATIENT
Start: 2023-01-18 | End: 2023-01-18

## 2023-01-18 RX ORDER — ONDANSETRON 4 MG/1
4 TABLET, ORALLY DISINTEGRATING ORAL EVERY 6 HOURS PRN
Status: DISCONTINUED | OUTPATIENT
Start: 2023-01-18 | End: 2023-01-18 | Stop reason: HOSPADM

## 2023-01-18 RX ORDER — CLONIDINE HYDROCHLORIDE 0.1 MG/1
0.1 TABLET ORAL EVERY 8 HOURS
Status: DISCONTINUED | OUTPATIENT
Start: 2023-01-18 | End: 2023-01-18 | Stop reason: HOSPADM

## 2023-01-18 RX ADMIN — LORAZEPAM 1 MG: 1 TABLET ORAL at 12:45

## 2023-01-18 RX ADMIN — ONDANSETRON 4 MG: 2 INJECTION INTRAMUSCULAR; INTRAVENOUS at 04:28

## 2023-01-18 RX ADMIN — DIAZEPAM 5 MG: 5 INJECTION INTRAMUSCULAR; INTRAVENOUS at 09:46

## 2023-01-18 RX ADMIN — POTASSIUM CHLORIDE 40 MEQ: 1500 TABLET, EXTENDED RELEASE ORAL at 10:50

## 2023-01-18 RX ADMIN — ENOXAPARIN SODIUM 40 MG: 40 INJECTION SUBCUTANEOUS at 07:40

## 2023-01-18 RX ADMIN — THIAMINE HCL TAB 100 MG 100 MG: 100 TAB at 07:40

## 2023-01-18 RX ADMIN — LORAZEPAM 1 MG: 2 INJECTION INTRAMUSCULAR; INTRAVENOUS at 11:56

## 2023-01-18 RX ADMIN — SODIUM CHLORIDE, POTASSIUM CHLORIDE, SODIUM LACTATE AND CALCIUM CHLORIDE: 600; 310; 30; 20 INJECTION, SOLUTION INTRAVENOUS at 05:28

## 2023-01-18 RX ADMIN — CLONIDINE HYDROCHLORIDE 0.1 MG: 0.1 TABLET ORAL at 07:40

## 2023-01-18 RX ADMIN — MULTIPLE VITAMINS W/ MINERALS TAB 1 TABLET: TAB at 07:40

## 2023-01-18 RX ADMIN — DIAZEPAM 10 MG: 5 TABLET ORAL at 05:28

## 2023-01-18 RX ADMIN — NICOTINE 1 PATCH: 14 PATCH, EXTENDED RELEASE TRANSDERMAL at 11:04

## 2023-01-18 RX ADMIN — ONDANSETRON 4 MG: 2 INJECTION INTRAMUSCULAR; INTRAVENOUS at 09:46

## 2023-01-18 RX ADMIN — SODIUM CHLORIDE 1000 ML: 9 INJECTION, SOLUTION INTRAVENOUS at 00:36

## 2023-01-18 RX ADMIN — DIAZEPAM 10 MG: 5 TABLET ORAL at 03:36

## 2023-01-18 RX ADMIN — DIAZEPAM 10 MG: 5 TABLET ORAL at 00:38

## 2023-01-18 RX ADMIN — DIAZEPAM 5 MG: 5 INJECTION INTRAMUSCULAR; INTRAVENOUS at 10:51

## 2023-01-18 RX ADMIN — DIAZEPAM 5 MG: 5 INJECTION INTRAMUSCULAR; INTRAVENOUS at 06:36

## 2023-01-18 RX ADMIN — DIAZEPAM 10 MG: 5 TABLET ORAL at 15:28

## 2023-01-18 RX ADMIN — CLONIDINE HYDROCHLORIDE 0.1 MG: 0.1 TABLET ORAL at 00:38

## 2023-01-18 RX ADMIN — LORAZEPAM 2 MG: 2 INJECTION INTRAMUSCULAR; INTRAVENOUS at 14:21

## 2023-01-18 RX ADMIN — FOLIC ACID 1 MG: 1 TABLET ORAL at 07:40

## 2023-01-18 ASSESSMENT — ACTIVITIES OF DAILY LIVING (ADL)
ADLS_ACUITY_SCORE: 35
ADLS_ACUITY_SCORE: 37
ADLS_ACUITY_SCORE: 35
ADLS_ACUITY_SCORE: 35
ADLS_ACUITY_SCORE: 37
ADLS_ACUITY_SCORE: 35
ADLS_ACUITY_SCORE: 35
ADLS_ACUITY_SCORE: 37

## 2023-01-18 ASSESSMENT — ENCOUNTER SYMPTOMS
HEADACHES: 1
ABDOMINAL PAIN: 0
NAUSEA: 0
HALLUCINATIONS: 0
ARTHRALGIAS: 1
VOMITING: 1
VOMITING: 0
HYPERACTIVE: 1
NERVOUS/ANXIOUS: 1
NAUSEA: 1

## 2023-01-18 NOTE — ED NOTES
Pulled pt IV per provider. Gave pt discharge paperwork and prescriptions. Pt said he cannot take librium and asked to speak to provider. Pt left before provider could see him again.

## 2023-01-18 NOTE — ED NOTES
Bed: ED15  Expected date:   Expected time:   Means of arrival:   Comments:  [facility], hand injury

## 2023-01-18 NOTE — PLAN OF CARE
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  Transfer: SBA  Bathroom: voiding in urinal  Pain: denying pain.  Telemetry Monitoring: Yes - SR/ST  Treatment: CIWA's, valium, tele, zofran, IVF. K+, Mg, & Phos protocol.   Discharge Plans: tbd        Blood pressure (!) 135/93, pulse 93, temperature 98.3  F (36.8  C), temperature source Oral, resp. rate 17, height 1.651 m (5' 5\"), SpO2 92 %.       "

## 2023-01-18 NOTE — H&P
"[facility]  Hospitalist Admission Note  Name: Giovani Damian    MRN: [id number]  YOB: 1980    Age: 42 year old  Date of admission: 1/18/2023  Primary care provider: No Ref-Primary, Physician    Chief Complaint:  Alcohol withdrawal,     Assessment and Plan:   Alcohol intoxication with early withdrawal:  Known history of alcoholism.  History of alcohol withdrawal seizures in the past.  He reports that he has been sober for period of time but relapsed approximately 1 month ago and has been on a constantino since.  Last drink he reports is over 24 hours ago however he presents with a EtOH of 0.2.  He is hoping to detox and attend outpatient treatment.  -CIWA protocol with Valium, gabapentin, clonidine  -Thiamine and folate  -MIVF with LR at 100 cc/h  -Zofran for nausea and vomiting  -Advance diet as tolerated  -He denies CD counseling at this time and plans to attend AA meetings on discharge    Elevated transaminases: Not in the typical pattern that you would expect for alcohol related hepatitis.  However, I do suspect that this is the likely etiology.  Repeat CMP in a.m.    Hypokalemia  Hypophosphatemia:  Likely related to poor p.o. intake in the setting of significant alcohol abuse.  Replace per nurse driven protocol.      PTA medications left be ordered in the morning once medicine reconciliation is completed      Clinically Significant Risk Factors Present on Admission        # Hypokalemia: Lowest K = 3.2 mmol/L in last 2 days, will replace as needed   # Hypocalcemia: Lowest Ca = 7.8 mg/dL in last 2 days, will monitor and replace as appropriate              # Overweight: Estimated body mass index is 29.12 kg/m  as calculated from the following:    Height as of this encounter: 1.651 m (5' 5\").    Weight as of 1/17/23: 79.4 kg (175 lb).             DVT Prophylaxis: Enoxaparin (Lovenox) SQ  Code Status: Full Code  Discharge Dispo: Anticipate discharge back home once improved  Estimated Disch " Date / # of Days until Disch: Anticipate 2 to 3 days in the hospital pending improvement in alcohol withdrawal      History of Present Illness:  Giovani Damian is a 42 year old male with PMH including alcohol use disorder, anxiety who presents with concern for alcohol withdrawal, hand pain.    Patient reports that he has been on a 1 month constantino.  Was in remission prior to this for prolonged period of time.  Reports that he stopped drinking approximately 24 hours ago and has developed anxiety, tremors, nausea and vomiting.  Denies any other significant symptoms such as fever, chest pain, shortness of breath, edema.  He notes that he would like to withdrawal but he would like to do it in a safe way as he has a history of alcohol withdrawal seizures.  His brother also reportedly  from alcohol withdrawal related complications.    ED work-up is notable for mild hypertension 151/101.  He is afebrile.  He is initially tachycardic to 118.  He is saturating fine on room air.  BMP notable for a potassium 3.2 but otherwise normal.  LFTs with an ALT of 54, AST 62, total bilirubin normal.  Glucose normal.  Lipase is 75.  CBC is largely unremarkable.  X-ray of the right hand shows no acute fracture dislocation.  CT head is with a small scalp contusion but no intracranial hemorrhage or fracture.  I was asked admit the patient given concern for alcohol withdrawal in the setting of a patient with alcohol withdrawal seizures.       Past Medical History:  Past Medical History:   Diagnosis Date     Anxiety      Substance abuse (H)      Past Surgical History:  Past Surgical History:   Procedure Laterality Date     MOUTH SURGERY       Social History:  Social History     Tobacco Use     Smoking status: Former     Packs/day: 1.00     Types: Dip, chew, snus or snuff, Cigarettes     Smokeless tobacco: Current     Tobacco comments:     using nicotine pouches- 20   Substance Use Topics     Alcohol use: Yes     Comment: over 1  "litter of wisky and beer a day     Social History     Social History Narrative     Not on file     Family History:  Family History   Problem Relation Age of Onset     No Known Problems Mother      No Known Problems Father      No Known Problems Maternal Grandmother      No Known Problems Maternal Grandfather      No Known Problems Paternal Grandmother      No Known Problems Paternal Grandfather      No Known Problems Brother      Allergies:  Allergies   Allergen Reactions     Seasonal Allergies      Medications:  No current facility-administered medications on file prior to encounter.  chlordiazePOXIDE (LIBRIUM) 10 MG capsule, Take 1 capsule (10 mg) by mouth 3 times daily as needed for anxiety  ClonazePAM (KLONOPIN PO), Take by mouth daily as needed for anxiety   cyclobenzaprine (FLEXERIL) 10 MG tablet, Take 1 tablet (10 mg) by mouth nightly as needed for muscle spasms  gabapentin (NEURONTIN) 300 MG capsule, Day 1 - 300 mg every 6 hours  Day 2 - 300 mg every 8 hours  Day 3 - 300 mg every 12 hours  Day 4 - 300 mg one dose  naproxen (NAPROSYN) 500 MG tablet, Take 1 tablet (500 mg) by mouth 2 times daily (with meals)  ondansetron (ZOFRAN ODT) 4 MG ODT tab, Take 1 tablet (4 mg) by mouth every 6 hours as needed for nausea or vomiting  sertraline 100 MG PO tablet, Take 1 tablet (100 mg) by mouth daily  clonazePAM (KLONOPIN) 0.5 MG tablet, Take 1-2 tablets (0.5-1 mg) by mouth 2 times daily as needed for anxiety  ondansetron (ZOFRAN ODT) 4 MG ODT tab, Take 1 tablet (4 mg) by mouth every 6 hours as needed for nausea or vomiting      Review of Systems:  A Comprehensive greater than 10 system review of systems was carried out.  Pertinent positives and negatives are noted above.  Otherwise negative for contributory information.     Physical Exam:  Blood pressure (!) 130/90, pulse 97, temperature 98.9  F (37.2  C), temperature source Oral, resp. rate 16, height 1.651 m (5' 5\"), SpO2 97 %.  Wt Readings from Last 1 Encounters: "   02/03/22 87.5 kg (192 lb 12.8 oz)     Exam:  General: Alert, awake, no acute distress.  HEENT: NC/AT, eyes anicteric, external occular movements intact, face symmetric.  Dentition WNL, MM moist.  Cardiac: RRR, S1, S2.  No murmurs appreciated.  Pulmonary: Normal chest rise, normal work of breathing.  Lungs CTA BL  Abdomen: soft, non-tender, non-distended.  Bowel Sounds Present.  No guarding.  Extremities: no deformities.  Warm, well perfused.  Skin: no rashes or lesions noted.  Warm and Dry.  Neuro: No focal deficits noted.  Speech clear.  Coordination and strength grossly normal.  Mild tremors noted in the bilateral hands.  Psych: Anxious affect.    Data:  EKG:  None obtained that I can see  Imaging:  Recent Results (from the past 48 hour(s))   CT Head w/o Contrast    Narrative    EXAM: CT HEAD W/O CONTRAST  LOCATION: [facility]  DATE/TIME: 1/18/2023 1:14 AM    INDICATION: fall, head trauma  COMPARISON: None.  TECHNIQUE: Routine CT Head without IV contrast. Multiplanar reformats. Dose reduction techniques were used.    FINDINGS:  INTRACRANIAL CONTENTS: No intracranial hemorrhage, extraaxial collection, or mass effect.  No CT evidence of acute infarct. Normal parenchymal attenuation. Normal ventricles and sulci.     VISUALIZED ORBITS/SINUSES/MASTOIDS: No intraorbital abnormality. No paranasal sinus mucosal disease. No middle ear or mastoid effusion.    BONES/SOFT TISSUES: Small right frontoparietal scalp contusion. No calvarial fracture.      Impression    IMPRESSION:  1.  Small right frontoparietal scalp contusion. No acute intracranial hemorrhage or calvarial fracture.   XR Hand Right G/E 3 Views    Narrative    EXAM: XR HAND RIGHT G/E 3 VIEWS  LOCATION: [facility]  DATE/TIME: 1/18/2023 1:19 AM    INDICATION: fall, tenderness over the 2nd and 3rd metacarpal  COMPARISON: None.      Impression    IMPRESSION: No acute fracture or dislocation. Old, healed deformity of the  fifth metacarpal.     Labs:  Recent Labs   Lab 01/18/23 0036   WBC 9.7   HGB 13.6   HCT 39.9*   MCV 90             Lab Results   Component Value Date     01/18/2023     01/08/2023     12/17/2022     03/14/2011    Lab Results   Component Value Date    CHLORIDE 98 01/18/2023    CHLORIDE 100 01/08/2023    CHLORIDE 101 12/17/2022    CHLORIDE 105 03/14/2011    Lab Results   Component Value Date    BUN 8.5 01/18/2023    BUN 17.7 01/08/2023    BUN 10.5 12/17/2022    BUN 9 03/14/2011      Lab Results   Component Value Date    POTASSIUM 3.2 01/18/2023    POTASSIUM 4.0 01/08/2023    POTASSIUM 3.7 12/17/2022    POTASSIUM 4.2 03/14/2011    Lab Results   Component Value Date    CO2 24 01/18/2023    CO2 22 01/08/2023    CO2 26 12/17/2022    CO2 25 03/14/2011    Lab Results   Component Value Date    CR 0.77 01/18/2023    CR 0.73 01/08/2023    CR 0.76 12/17/2022    CR 0.85 03/14/2011        Recent Labs   Lab 01/18/23 0036      POTASSIUM 3.2*   CHLORIDE 98   CO2 24   ANIONGAP 15   GLC 97   BUN 8.5   CR 0.77   GFRESTIMATED >90   ISAIAS 7.8*   MAG 1.8   PHOS 2.2*   PROTTOTAL 6.3*   ALBUMIN 4.0   BILITOTAL 0.3   ALKPHOS 98   AST 62*   ALT 54*         DO Kyara Benavidesist  [facility] [place]

## 2023-01-18 NOTE — PHARMACY-ADMISSION MEDICATION HISTORY
Admission medication history interview status for this patient is complete. See [facility] admission navigator for allergy information, prior to admission medications and immunization status.     Medication history interview done, indicate source(s): Patient  Medication history resources (including written lists, pill bottles, clinic record):[employer]  Pharmacy: [id number]    Changes made to PTA medication list:  Added: none  Changed: none  Reported as Not Taking: none  Removed: Libirum, cyclobenzaprine, gabapentin, naproxen, ondansetron, setraline    Actions taken by pharmacist (provider contacted, etc):Left provider sticky note     Additional medication history information: Pt states he gets monthly clonazepam that he is prescribed to take up to 1 tablet a day. Reports has been taking more than that recently due to current life stressors. Denied any other RX or OTC medications.     Medication reconciliation/reorder completed by provider prior to medication history?  N   (Y/N)     Prior to Admission medications    Medication Sig Last Dose Taking? Auth Provider Long Term End Date   clonazePAM (KLONOPIN) 0.5 MG tablet Take 1 mg by mouth daily as needed for anxiety Past Month at prn Yes Reported, Patient Yes

## 2023-01-18 NOTE — ED PROVIDER NOTES
History     Chief Complaint:  Withdrawal       The history is provided by the patient.      Giovani Damian is a 42 year old male who presents with concerns for alcohol withdrawal symptoms including shaking, nausea, and vomiting. Denies vomiting since earlier this morning. He was evaluated in the ED yesterday, admitted overnight and left the hospital this morning against medical advice. He states he left to check on his dog, but couldn't get a cab, so he smoked a cigarette and returned to the hospital, checking into the ED. Denies drinking anything since leaving the hospital. Reports last drink was about 24 hours ago. Denies abdominal pain, fever, diarrhea, chest pain or other symptoms. No suicidal ideations or hallucinations.  He denies interest in detox.     Independent Historian: Yes     Review of External Notes: I reviewed Care Everywhere.      ROS:  Review of Systems   Gastrointestinal: Positive for nausea and vomiting. Negative for abdominal pain.   Psychiatric/Behavioral: Negative for hallucinations. The patient is nervous/anxious and is hyperactive.    All other systems reviewed and are negative.    Allergies:  Seasonal Allergies     Medications:    Vistaril  Klonopin    Past Medical History:    Anxiety   Substance abuse  Depression     Past Surgical History:    Mouth surgery    Wrist fracture  NC closed tx rib fracture    Family History:    Father- hypertension, anxiety   Sister- anxiety    Social History:  The patient presents to the ED alone.    Physical Exam     Patient Vitals for the past 24 hrs:   BP Temp Temp src Pulse Resp SpO2   01/18/23 1508 -- -- -- 103 20 100 %   01/18/23 1453 -- -- -- 83 27 94 %   01/18/23 1438 -- -- -- 85 25 95 %   01/18/23 1425 (!) 142/92 -- -- 98 14 97 %   01/18/23 1336 (!) 153/109 97.8  F (36.6  C) Temporal 105 20 97 %        Physical Exam  Nursing note and vitals reviewed.  Constitutional: Well nourished.   Eyes: Conjunctiva normal.  Pupils are equal, round, and reactive  to light.   ENT: Nose normal. Mucous membranes pink and moist.    Neck: Normal range of motion.  CVS: Sinus tachycardia.  Normal heart sounds.    Pulmonary: Lungs clear to auscultation bilaterally. No wheezes/rales/rhonchi.  GI: Abdomen soft. Nontender, nondistended. No rigidity or guarding.    MSK: No calf tenderness or swelling.  Neuro: Alert. Follows simple commands. Tremulous  Skin: Skin is warm and dry. No rash noted.   Psychiatric: Denies suicidal ideations or hallucinations      Emergency Department Course   Laboratory:  Labs Ordered and Resulted from Time of ED Arrival to Time of ED Departure   COMPREHENSIVE METABOLIC PANEL - Abnormal       Result Value    Sodium 138      Potassium 3.6      Chloride 101      Carbon Dioxide (CO2) 23      Anion Gap 14      Urea Nitrogen 6.8      Creatinine 0.77      Calcium 8.1 (*)     Glucose 94      Alkaline Phosphatase 95      AST 71 (*)     ALT 51 (*)     Protein Total 6.0 (*)     Albumin 3.9      Bilirubin Total 0.7      GFR Estimate >90     MAGNESIUM - Abnormal    Magnesium 1.4 (*)    CBC WITH PLATELETS AND DIFFERENTIAL - Abnormal    WBC Count 6.9      RBC Count 4.18 (*)     Hemoglobin 13.0 (*)     Hematocrit 38.0 (*)     MCV 91      MCH 31.1      MCHC 34.2      RDW 13.5      Platelet Count 275      % Neutrophils 70      % Lymphocytes 22      % Monocytes 8      % Eosinophils 0      % Basophils 0      % Immature Granulocytes 0      NRBCs per 100 WBC 0      Absolute Neutrophils 4.7      Absolute Lymphocytes 1.5      Absolute Monocytes 0.6      Absolute Eosinophils 0.0      Absolute Basophils 0.0      Absolute Immature Granulocytes 0.0      Absolute NRBCs 0.0     ETHYL ALCOHOL LEVEL - Normal    Alcohol ethyl <0.01          Emergency Department Course & Assessments:       Interventions:  Medications   LORazepam (ATIVAN) injection 2 mg (2 mg Intravenous Given 1/18/23 1421)   diazepam (VALIUM) tablet 10 mg (10 mg Oral Given 1/18/23 1528)     Consultations/Discussion of  Management or Tests:  ED Course as of 01/18/23 1744   Wed Jan 18, 2023   1402 I obtained the history and examined the patient as noted above.      Social Determinants of Health affecting care:  Reports he has no one to watch his dog.    Disposition:  The patient left AMA.     Impression & Plan    Medical Decision Making:  Patient is a 42-year-old male presenting with concerns for alcohol withdrawl.  He recently left AMA from the hospital after admission for alcohol withdraw.  He reportedly went outside to smoke and rechecked into the ED for evaluation.  He is tremulous on arrival and mildly tachycardic. CIWA protocol initiated. He was given IV Ativan and Valium with improvements in his symptoms overall.  He was initially agreeable to hospitalization for continued management of alcohol withdraw though when hospitalist assessed patient at bedside he reported he no longer wanted to stay as he needed to get a ride home.    The patient has decided not to proceed with further recommended testing or treatment to determine the cause of their symptoms. The risks and alternatives to the recommendation were discussed and the patient voiced understanding. The patient appears clinically to have capacity to make this decision. The patient was instructed that he could return to the ER at any time to complete the testing or treatment.   I encouraged the patient to follow-up with his primary doctor as soon as possible.  He will be given a prescription for librium and ODT zofran for breakthrough symptoms.  He also was provided a list of detox resources.      Diagnosis:    ICD-10-CM    1. Alcohol withdrawal syndrome without complication (H)  F10.930       2. Hypomagnesemia  E83.42            Discharge Medications:  Discharge Medication List as of 1/18/2023  3:34 PM      START taking these medications    Details   chlordiazePOXIDE (LIBRIUM) 25 MG capsule Take 1 capsule (25 mg) by mouth 3 times daily as needed for anxiety, Disp-15  capsule, R-0, Local Print      ondansetron (ZOFRAN ODT) 4 MG ODT tab Take 1 tablet (4 mg) by mouth every 8 hours as needed for nausea, Disp-10 tablet, R-0, Local Print              Scribe Disclosure:  I, Mia Stone, am serving as a scribe at 2:10 PM on 1/18/2023 to document services personally performed by Nat Espinosa DO based on my observations and the provider's statements to me.    1/18/2023   Nat Espinosa DO McDonald, Lindsey E, DO  01/18/23 1741

## 2023-01-18 NOTE — ED TRIAGE NOTES
Pt stayed in hospital overnight for withdrawal from alcohol. Was discharged and went outside then realized that he is still shaking and in withdrawal. Checked back into the ED.

## 2023-01-18 NOTE — ED PROVIDER NOTES
History     Chief Complaint:  Alcohol Intoxication and Hand Injury       HPI   Giovani Damian is a 42 year old male with history of alcohol abuse who presents via EMS with hand injury. He states that he fell a week ago and hit his head and right hand. He has had a headache and right hand pain since then with swelling to his right hand. He states that his tailbone also is painful as he fell again since then. Denies nausea, vomiting or visual changes. He reports that he was sober for 7 years and has been drinking for the last 4 weeks. His last drink was 1500 yesterday. He reports history of alcohol withdrawal seizure.  He states he lost his brother due to during detox 3 years ago. Denies drug use. He states the last month has been difficult and his children's mother left him and he has been unable to see his kids. Denies any suicidal thoughts.    Independent Historian: Yes, see details above in HPI    Review of External Notes: I reviewed emergency department note from 1117/2022 from [facility] regarding patient's presentation with altered mental status.x    ROS:  Review of Systems   Eyes: Negative for visual disturbance.   Gastrointestinal: Negative for nausea and vomiting.   Musculoskeletal: Positive for arthralgias (right hand).   Neurological: Positive for headaches.   Psychiatric/Behavioral: Negative for suicidal ideas.   All other systems reviewed and are negative.      Allergies:  Seasonal Allergies     Medications:     Neurontin  Sertraline    Past Medical History:    Anxiety  Substance abuse  Alcohol abuse  Depression  Smoking    Past Surgical History:    Mouth surgery  Wrist fracture procedure    Family History:    Father - hypertension, psychiatric illness  Sister - psychiatric illness    Social History:  The patient presents to the ED via EMS alone.  He has been drinking alcohol for the last 4 weeks after being sober 7 years.   Denies drug use.    Physical Exam     Patient Vitals for the past 24 hrs:   BP  "Temp Temp src Pulse Resp SpO2 Height   01/18/23 0536 (!) 135/93 98.3  F (36.8  C) Oral 93 17 -- --   01/18/23 0400 (!) 141/90 -- -- 99 -- 92 % --   01/18/23 0300 119/78 -- -- 90 -- 97 % --   01/18/23 0245 (!) 130/90 -- -- -- -- 97 % --   01/18/23 0215 120/83 -- -- -- -- 95 % --   01/18/23 0200 129/81 -- -- 97 -- 95 % --   01/18/23 0140 136/86 -- -- 101 -- 97 % --   01/18/23 0115 135/86 -- -- 106 -- 95 % --   01/18/23 0040 136/87 -- -- 107 -- 95 % --   01/18/23 0010 (!) 151/101 -- -- -- -- 97 % --   01/18/23 0008 (!) 151/101 98.9  F (37.2  C) Oral 118 16 97 % 1.651 m (5' 5\")        Physical Exam  General: Patient is awake, alert  Head: The scalp, face, and head appear normal  Eyes: The pupils are equal, round, and reactive to light. Conjunctivae and sclerae are normal  ENT: External acoustic canals are normal. The oropharynx is normal without erythema. Uvula is in the midline.  Tongue fasciculations present  Neck: Normal range of motion.   CV: Tachycardic but regular  Resp: Lungs are clear without wheezes or rales. No respiratory distress.   GI: Abdomen is soft, no rigidity, guarding, or rebound. No distension. No tenderness to palpation in any quadrant.     MS: Normal tone. Joints grossly normal without effusions. No asymmetric leg swelling, calf or thigh tenderness.    Skin: No rash or lesions noted. Normal capillary refill noted  Neuro: Speech is normal and fluent. Face is symmetric. Moving all extremities.   Psych:  Normal affect.  Appropriate interactions.      Emergency Department Course     Imaging:  XR Hand Right G/E 3 Views   Final Result   IMPRESSION: No acute fracture or dislocation. Old, healed deformity of the fifth metacarpal.      CT Head w/o Contrast   Final Result   IMPRESSION:   1.  Small right frontoparietal scalp contusion. No acute intracranial hemorrhage or calvarial fracture.         Report per radiology    Laboratory:  Labs Ordered and Resulted from Time of ED Arrival to Time of ED Departure "   COMPREHENSIVE METABOLIC PANEL - Abnormal       Result Value    Sodium 137      Potassium 3.2 (*)     Chloride 98      Carbon Dioxide (CO2) 24      Anion Gap 15      Urea Nitrogen 8.5      Creatinine 0.77      Calcium 7.8 (*)     Glucose 97      Alkaline Phosphatase 98      AST 62 (*)     ALT 54 (*)     Protein Total 6.3 (*)     Albumin 4.0      Bilirubin Total 0.3      GFR Estimate >90     LIPASE - Abnormal    Lipase 75 (*)    ETHYL ALCOHOL LEVEL - Abnormal    Alcohol ethyl 0.25 (*)    PHOSPHORUS - Abnormal    Phosphorus 2.2 (*)    CBC WITH PLATELETS AND DIFFERENTIAL - Abnormal    WBC Count 9.7      RBC Count 4.42      Hemoglobin 13.6      Hematocrit 39.9 (*)     MCV 90      MCH 30.8      MCHC 34.1      RDW 13.4      Platelet Count 294      % Neutrophils 55      % Lymphocytes 36      % Monocytes 8      % Eosinophils 0      % Basophils 1      % Immature Granulocytes 0      NRBCs per 100 WBC 0      Absolute Neutrophils 5.3      Absolute Lymphocytes 3.5      Absolute Monocytes 0.8      Absolute Eosinophils 0.0      Absolute Basophils 0.1      Absolute Immature Granulocytes 0.0      Absolute NRBCs 0.0     MAGNESIUM - Normal    Magnesium 1.8     INR - Normal    INR 0.99     BASIC METABOLIC PANEL   CBC WITH PLATELETS   MAGNESIUM   PHOSPHORUS      Emergency Department Course & Assessments:    Interventions:  Medications   cloNIDine (CATAPRES) tablet 0.1 mg (0.1 mg Oral Given 1/18/23 0038)   flumazenil (ROMAZICON) injection 0.2 mg (has no administration in time range)   melatonin tablet 5 mg (has no administration in time range)   diazepam (VALIUM) tablet 10 mg (10 mg Oral Given 1/18/23 0528)     Or   diazepam (VALIUM) injection 5-10 mg ( Intravenous See Alternative 1/18/23 0528)   thiamine (B-1) tablet 100 mg (has no administration in time range)   folic acid (FOLVITE) tablet 1 mg (has no administration in time range)   multivitamin w/minerals (THERA-VIT-M) tablet 1 tablet (has no administration in time range)    ondansetron (ZOFRAN) injection 4 mg (4 mg Intravenous Given 1/18/23 0428)   lidocaine 1 % 0.1-1 mL (has no administration in time range)   lidocaine (LMX4) cream (has no administration in time range)   sodium chloride (PF) 0.9% PF flush 3 mL (3 mLs Intracatheter Given 1/18/23 0544)   sodium chloride (PF) 0.9% PF flush 3 mL (has no administration in time range)   melatonin tablet 1 mg (has no administration in time range)   enoxaparin ANTICOAGULANT (LOVENOX) injection 40 mg (has no administration in time range)   lactated ringers infusion ( Intravenous New Bag 1/18/23 0592)   acetaminophen (TYLENOL) tablet 650 mg (has no administration in time range)     Or   acetaminophen (TYLENOL) Suppository 650 mg (has no administration in time range)   senna-docusate (SENOKOT-S/PERICOLACE) 8.6-50 MG per tablet 1 tablet (has no administration in time range)     Or   senna-docusate (SENOKOT-S/PERICOLACE) 8.6-50 MG per tablet 2 tablet (has no administration in time range)   ondansetron (ZOFRAN ODT) ODT tab 4 mg (has no administration in time range)     Or   ondansetron (ZOFRAN) injection 4 mg (has no administration in time range)   OLANZapine zydis (zyPREXA) ODT tab 5-10 mg (has no administration in time range)     Or   haloperidol lactate (HALDOL) injection 2.5-5 mg (has no administration in time range)   0.9% sodium chloride BOLUS (0 mLs Intravenous Stopped 1/18/23 0331)   gabapentin (NEURONTIN) tablet 1,200 mg (1,200 mg Oral Not Given 1/18/23 0052)        Independent Interpretation (X-rays, CTs, rhythm strip):  I reviewed x-rays which did not show any signs of fracture or dislocation    Consultations/Discussion of Management or Tests:  0024 I obtained history and examined the patient as noted above.    Social Determinants of Health affecting care:  Ongoing alcohol abuse    Disposition:  The patient was admitted to the hospital under the care of Dr. Peacock.     Impression & Plan      Medical Decision Making:  Patient is a  42-year-old gentleman with past medical history of polysubstance abuse and alcohol abuse who presents to the emergency department with concerns for alcohol withdrawal.  Patient reports history of alcohol withdrawal seizures and significant family history of poor outcomes with alcohol withdrawal.  Patient reports that he has been increasing his drinking due to increasing personal stressors including his baby mama leaving him.  Upon initial evaluation here he is tachycardic, tremulous and hypertensive.  He was started on CIWA protocol and was treated with antiemetics and Valium.  Patient had improvement of his symptoms.  However given his history of alcohol withdrawal seizures and anticipated clinical course will admit for further evaluation and treatment of his alcohol withdrawal.  Patient also reports recent fall in which she injured his right hand and hit his head.  Thankfully imaging was negative for any traumatic pathology.    Diagnosis:    ICD-10-CM    1. Alcohol withdrawal syndrome without complication (H)  F10.930       2. Pain of right hand  M79.641          Scribe Disclosure:  Gloria SEE, am serving as a scribe at 12:28 AM on 1/18/2023 to document services personally performed by Maverick Padron MD based on my observations and the provider's statements to me.    1/18/2023   Maverick Padron MD Battista, Christopher Joseph, MD  01/18/23 0604

## 2023-01-18 NOTE — PROGRESS NOTES
See H&P from my colleague Dr. Ramirez from this AM.  Patient seen and evaluated.  42-year-old male with alcohol use disorder with a history of withdrawal who presented with acute alcohol intoxication and concern for withdrawal symptoms.  He is currently feeling anxious and tremulous.  Hypertensive with mild tachycardia.    -Patient tells me he is not able to tolerate gabapentin due to stomach upset.  -We will continue to monitor on CIWA protocol.  Continue clonidine given hypertension and tachycardia.  We will switch from diazepam to lorazepam.  -Continue with fluids and vitamins.  -I did offer chemical dependency but patient declines this.  He is going to follow-up with his sponsor and AA meetings.    Addendum: Patient discharge AGAINST MEDICAL ADVICE.  Please see discharge summary.  He was encouraged to come back to the ER if he changes his mind and wants to continue treatment for his alcohol withdrawal.    Michael Schneider MD

## 2023-01-18 NOTE — ED TRIAGE NOTES
Pt arrives via EMS after calling 911 for a hand injury and alcohol withdrawal. Pt was sober for 7 years and recently started drinking over the last 3 weeks. Pt has been drinking about 2 pints of fireball per day. Last drink yesterday at 3pm. Pt had a fall a week ago and hit head and hurt hand. R hand is swollen and painful. 20g IV in L hand. 4mg of zofran given, 15mg of toradol IV. AxOx4. Hx of seizure withdrawals per patient.

## 2023-01-18 NOTE — PROGRESS NOTES
Paged re: readmission on this patient.  I placed preliminary admission orders for alcohol withdrawal minus gabapentin as patient has not tolerated this in the past.  I went to go see the patient but he tells me he is not staying in the hospital.  He is called his friend who is going to pick him up at 1600 and bring him home.  He is not willing to stay in the hospital.  He already left AMA earlier today and is now not willing to be readmitted.    I discussed with Dr. Pradhan, ER provider.  She is aware of patient's refusal to stay in hospital and he will not be admitted.     Michael Schneider MD

## 2023-01-18 NOTE — ED TRIAGE NOTES
Pt. here for detox from.  Pt. decided he did not want to stay and left with sober ride.  Refused to sign declination of medical screening.     Triage Assessment     Row Name 01/17/23 2059       Triage Assessment (Adult)    Airway WDL WDL       Respiratory WDL    Respiratory WDL WDL       Skin Circulation/Temperature WDL    Skin Circulation/Temperature WDL WDL       Cardiac WDL    Cardiac WDL WDL       Peripheral/Neurovascular WDL    Peripheral Neurovascular WDL WDL       Cognitive/Neuro/Behavioral WDL    Cognitive/Neuro/Behavioral WDL WDL

## 2023-01-18 NOTE — PLAN OF CARE
A/O X4. IV removed. Left AMA- Dr. Schneider say and talked to the patient before he signed the papers.

## 2023-01-18 NOTE — ED NOTES
Rapid Assessment Note    History:   Giovani Damian is a 42 year old male who presents with withdrawal symptoms including shaking, nausea, and vomiting. He was in detox last night, and left the hospital against medical advice this morning. He states he left to check on his dog, but couldn't get a cab, so he smoked a cigarette and returned to the hospital. Denies drinking anything. Report last drink was about 24 hours ago. Denies abdominal pain and hallucinations. Denies thoughts of self-harm.    Exam:   General:  Alert, interactive  Cardiovascular:  Well perfused  Lungs:  No respiratory distress, no accessory muscle use  Neuro:  Moving all 4 extremities  Skin:  Warm, dry  Psych:  Normal affect  ***    Plan of Care:   I evaluated the patient and developed an initial plan of care. I discussed this plan and explained that I, or one of my partners, would be returning to complete the evaluation.     I, Mia Stone, am serving as a scribe to document services personally performed by Nat Espinosa DO, based on my observations and the provider's statements to me.    1/18/2023  [employer]    Portions of this medical record were completed by a scribe. UPON MY REVIEW AND AUTHENTICATION BY ELECTRONIC SIGNATURE, this confirms (a) I performed the applicable clinical services, and (b) the record is accurate.

## 2023-01-18 NOTE — DISCHARGE SUMMARY
[facility]    Discharge Summary  Hospitalist    Patient was discharged AGAINST MEDICAL ADVICE in the early afternoon of .    Date of Admission:  2023  Date of Discharge:  2023  Discharging Provider: Michael Schneider MD  Date of Service (when I saw the patient): 23    Discharge Diagnoses   #Acute alcohol intoxication with signs of withdrawal  #Elevated LFTs secondary to alcohol use   #Alcohol use disorder  #Hypokalemia and hypophosphatemia    History of Present Illness   Giovani Damian is a 42 year old male with PMH including alcohol use disorder, anxiety who presents with concern for alcohol withdrawal, hand pain.     Patient reports that he has been on a 1 month constantino.  Was in remission prior to this for prolonged period of time.  Reports that he stopped drinking approximately 24 hours ago and has developed anxiety, tremors, nausea and vomiting.  Denies any other significant symptoms such as fever, chest pain, shortness of breath, edema.  He notes that he would like to withdrawal but he would like to do it in a safe way as he has a history of alcohol withdrawal seizures.  His brother also reportedly  from alcohol withdrawal related complications.     ED work-up is notable for mild hypertension 151/101.  He is afebrile.  He is initially tachycardic to 118.  He is saturating fine on room air.  BMP notable for a potassium 3.2 but otherwise normal.  LFTs with an ALT of 54, AST 62, total bilirubin normal.  Glucose normal.  Lipase is 75.  CBC is largely unremarkable.  X-ray of the right hand shows no acute fracture dislocation.  CT head is with a small scalp contusion but no intracranial hemorrhage or fracture.  I was asked admit the patient given concern for alcohol withdrawal in the setting of a patient with alcohol withdrawal seizures.    Hospital Course   Giovani Damian was admitted on 2023.  The following problems were addressed during his  hospitalization:    Patient was admitted to the hospital for treatment of alcohol use disorder and withdrawal.  He was showing signs of alcohol withdrawal with tremulousness, anxiety along with elevated blood pressures and tachycardia.  In the early afternoon of 1/18, patient became adamant that he was going to discharge home.  He was not willing to stay in the hospital any longer.  He noted that he had a dog at home needing to take care of.  I offered social work as an alternative to see if they may have options to take care of the dog but he declines this.  He stated he was not willing to stay in the hospital any longer.    I explained that he is in alcohol withdrawal and he is high risk of having worsening withdrawal including seizures and even death.  He voiced understanding of this but was not willing to stay in the hospital any longer.  I encouraged him to come back to the ER if he changes his mind.  AMA paperwork given to the patient.    Michael Schneider MD    Code Status   Full Code       Primary Care Physician   Physician No Ref-Primary    Physical Exam   Temp: 98.8  F (37.1  C) Temp src: Oral BP: (!) 147/101 Pulse: 99   Resp: 18 SpO2: 98 % O2 Device: None (Room air) Oxygen Delivery: 2 LPM  There were no vitals filed for this visit.  Vital Signs with Ranges  Temp:  [98.3  F (36.8  C)-98.9  F (37.2  C)] 98.8  F (37.1  C)  Pulse:  [] 99  Resp:  [16-24] 18  BP: (119-151)/() 147/101  SpO2:  [92 %-98 %] 98 %  I/O last 3 completed shifts:  In: -   Out: 100 [Urine:100]    On exam, patient has ambulating.  He has bilateral upper extremity tremulousness with anxiety noted.  Heart is regular with mild tachycardia.  Lungs are clear.  Extremities are warm and well-perfused.  Some scattered bruising noted.  He moves all extremities and answers questions appropriately.  He is oriented to time and place.  He answers appropriately.    Discharge Disposition   Discharged home AGAINST MEDICAL ADVICE    Consultations  This Hospital Stay   None    Time Spent on this Encounter   I, Michael Schneider MD, personally saw the patient today and spent greater than 30 minutes discharging this patient.    Discharge Orders   No discharge procedures on file.  Discharge Medications   Current Discharge Medication List      CONTINUE these medications which have NOT CHANGED    Details   clonazePAM (KLONOPIN) 0.5 MG tablet Take 1 mg by mouth daily as needed for anxiety           Allergies   Allergies   Allergen Reactions     Seasonal Allergies      Data   Most Recent 3 CBC's:Recent Labs   Lab Test 01/18/23  0800 01/18/23  0036 01/08/23  1122   WBC 7.6 9.7 10.6   HGB 12.4* 13.6 17.4   MCV 93 90 91    294 436      Most Recent 3 BMP's:  Recent Labs   Lab Test 01/18/23  0800 01/18/23  0036 01/08/23  1122    137 139   POTASSIUM 3.2* 3.2* 4.0   CHLORIDE 103 98 100   CO2 25 24 22   BUN 7.3 8.5 17.7   CR 0.79 0.77 0.73   ANIONGAP 12 15 17*   ISAIAS 7.8* 7.8* 8.9   GLC 90 97 147*     Most Recent 2 LFT's:  Recent Labs   Lab Test 01/18/23  0036 01/08/23  1122   AST 62* 48   ALT 54* 31   ALKPHOS 98 118   BILITOTAL 0.3 0.8     Most Recent INR's and Anticoagulation Dosing History:  Anticoagulation Dose History     Recent Dosing and Labs Latest Ref Rng & Units 1/18/2023    INR 0.85 - 1.15 0.99        Most Recent 3 Troponin's:No lab results found.  Most Recent Cholesterol Panel:No lab results found.  Most Recent 6 Bacteria Isolates From Any Culture (See EPIC Reports for Culture Details):No lab results found.  Most Recent TSH, T4 and A1c Labs:No lab results found.

## 2023-01-18 NOTE — ED NOTES
[facility]  ED Nurse Handoff Report    Giovani Damian is a 42 year old male   ED Chief complaint: Alcohol Intoxication and Hand Injury  . ED Diagnosis:   Final diagnoses:   None     Allergies:   Allergies   Allergen Reactions     Seasonal Allergies        Code Status: Full Code  Activity level - Baseline/Home:  Independent. Activity Level - Current:   Stand by Assist. Lift room needed: No. Bariatric: No   Needed: No   Isolation: No. Infection: Not Applicable.     Vital Signs:   Vitals:    01/18/23 0010 01/18/23 0040 01/18/23 0115 01/18/23 0140   BP: (!) 151/101 136/87 135/86 136/86   Pulse:  107 106 101   Resp:       Temp:       TempSrc:       SpO2: 97% 95% 95% 97%   Height:           Cardiac Rhythm:  ,      Pain level:    Patient confused: No. Patient Falls Risk: Yes.   Elimination Status: Has voided   Patient Report - Initial Complaint: Alcohol intoxication, hand injury.   Focused Assessment:   Musculoskeletal (Adult) Musculoskeletal WDL: .WDL except  (R hand swelling and pain, CMS intact)    Behavioral Health General Appearance WDL: WDL   Behavior WDL Behavior WDL: .WDL except; all  Interactions: cooperative  Motor Movement: restless   Emotion Mood WDL Emotion/Mood/Affect WDL: WDL   Speech WDL Speech WDL: WDL   Perceptual State WDL Perceptual State WDL: WDL   Thought Process WDL Thought Process WDL: WDL   C-SSRS (Recent) Q1 Wished to be Dead (Past Month): no  Q2 Suicidal Thoughts (Past Month): no  Q3 Suicidal Thought Method: no  Q4 Suicidal Intent without Specific Plan: no  Q5 Suicide Intent with Specific Plan: no  Q6 Suicide Behavior (Lifetime): no   Intellectual Performance WDL Level of Consciousness: alert  Intellectual Performance WDL: WDL   Chemical Abuse/Addictions Alcohol: Binge  Withdrawal Symptoms: Tremors; Nausea  Last Use:: 01/17/23  (3pm, 2 pints fireball)  Tests Performed:   Labs Ordered and Resulted from Time of ED Arrival to Time of ED Departure   COMPREHENSIVE METABOLIC  PANEL - Abnormal       Result Value    Sodium 137      Potassium 3.2 (*)     Chloride 98      Carbon Dioxide (CO2) 24      Anion Gap 15      Urea Nitrogen 8.5      Creatinine 0.77      Calcium 7.8 (*)     Glucose 97      Alkaline Phosphatase 98      AST 62 (*)     ALT 54 (*)     Protein Total 6.3 (*)     Albumin 4.0      Bilirubin Total 0.3      GFR Estimate >90     LIPASE - Abnormal    Lipase 75 (*)    ETHYL ALCOHOL LEVEL - Abnormal    Alcohol ethyl 0.25 (*)    PHOSPHORUS - Abnormal    Phosphorus 2.2 (*)    CBC WITH PLATELETS AND DIFFERENTIAL - Abnormal    WBC Count 9.7      RBC Count 4.42      Hemoglobin 13.6      Hematocrit 39.9 (*)     MCV 90      MCH 30.8      MCHC 34.1      RDW 13.4      Platelet Count 294      % Neutrophils 55      % Lymphocytes 36      % Monocytes 8      % Eosinophils 0      % Basophils 1      % Immature Granulocytes 0      NRBCs per 100 WBC 0      Absolute Neutrophils 5.3      Absolute Lymphocytes 3.5      Absolute Monocytes 0.8      Absolute Eosinophils 0.0      Absolute Basophils 0.1      Absolute Immature Granulocytes 0.0      Absolute NRBCs 0.0     MAGNESIUM - Normal    Magnesium 1.8     INR - Normal    INR 0.99       Abnormal Results:   XR Hand Right G/E 3 Views   Final Result   IMPRESSION: No acute fracture or dislocation. Old, healed deformity of the fifth metacarpal.      CT Head w/o Contrast   Final Result   IMPRESSION:   1.  Small right frontoparietal scalp contusion. No acute intracranial hemorrhage or calvarial fracture.         Treatments provided: See MAR  Family Comments: N/A  OBS brochure/video discussed/provided to patient:  No  ED Medications:   Medications   cloNIDine (CATAPRES) tablet 0.1 mg (0.1 mg Oral Given 1/18/23 0038)   flumazenil (ROMAZICON) injection 0.2 mg (has no administration in time range)   melatonin tablet 5 mg (has no administration in time range)   diazepam (VALIUM) tablet 10 mg (10 mg Oral Given 1/18/23 0038)     Or   diazepam (VALIUM) injection 5-10 mg  ( Intravenous See Alternative 1/18/23 0038)   thiamine (B-1) tablet 100 mg (has no administration in time range)   folic acid (FOLVITE) tablet 1 mg (has no administration in time range)   multivitamin w/minerals (THERA-VIT-M) tablet 1 tablet (has no administration in time range)   0.9% sodium chloride BOLUS (1,000 mLs Intravenous New Bag 1/18/23 0036)   gabapentin (NEURONTIN) tablet 1,200 mg (1,200 mg Oral Not Given 1/18/23 0052)     Drips infusing:  No  For the majority of the shift, the patient's behavior Green. Interventions performed were N/A.    Sepsis treatment initiated: No     Patient tested for COVID 19 prior to admission: NO    ED Nurse Name/Phone Number: Emili Gaytan RN,   1:53 AM

## 2023-04-28 ENCOUNTER — HOSPITAL ENCOUNTER (EMERGENCY)
Facility: CLINIC | Age: 43
Discharge: HOME | End: 2023-04-28
Attending: EMERGENCY MEDICINE | Admitting: EMERGENCY MEDICINE
Payer: COMMERCIAL

## 2023-04-28 VITALS
SYSTOLIC BLOOD PRESSURE: 116 MMHG | DIASTOLIC BLOOD PRESSURE: 69 MMHG | HEIGHT: 65 IN | RESPIRATION RATE: 17 BRPM | TEMPERATURE: 98 F | HEART RATE: 88 BPM | WEIGHT: 165.79 LBS | OXYGEN SATURATION: 96 % | BODY MASS INDEX: 27.62 KG/M2

## 2023-04-28 DIAGNOSIS — R07.89 ATYPICAL CHEST PAIN: ICD-10-CM

## 2023-04-28 LAB
ANION GAP SERPL CALCULATED.3IONS-SCNC: 11 MMOL/L (ref 7–15)
BASOPHILS # BLD AUTO: 0.1 10E3/UL (ref 0–0.2)
BASOPHILS NFR BLD AUTO: 1 %
BUN SERPL-MCNC: 11 MG/DL (ref 6–20)
CALCIUM SERPL-MCNC: 9 MG/DL (ref 8.6–10)
CHLORIDE SERPL-SCNC: 103 MMOL/L (ref 98–107)
CREAT SERPL-MCNC: 1.04 MG/DL (ref 0.67–1.17)
DEPRECATED HCO3 PLAS-SCNC: 25 MMOL/L (ref 22–29)
EOSINOPHIL # BLD AUTO: 0.3 10E3/UL (ref 0–0.7)
EOSINOPHIL NFR BLD AUTO: 3 %
ERYTHROCYTE [DISTWIDTH] IN BLOOD BY AUTOMATED COUNT: 12.4 % (ref 10–15)
GFR SERPL CREATININE-BSD FRML MDRD: >90 ML/MIN/1.73M2
GLUCOSE SERPL-MCNC: 108 MG/DL (ref 70–99)
HCT VFR BLD AUTO: 44.2 % (ref 40–53)
HGB BLD-MCNC: 14.9 G/DL (ref 13.3–17.7)
HOLD SPECIMEN: NORMAL
IMM GRANULOCYTES # BLD: 0 10E3/UL
IMM GRANULOCYTES NFR BLD: 0 %
LYMPHOCYTES # BLD AUTO: 3.8 10E3/UL (ref 0.8–5.3)
LYMPHOCYTES NFR BLD AUTO: 38 %
MCH RBC QN AUTO: 30.2 PG (ref 26.5–33)
MCHC RBC AUTO-ENTMCNC: 33.7 G/DL (ref 31.5–36.5)
MCV RBC AUTO: 90 FL (ref 78–100)
MONOCYTES # BLD AUTO: 0.7 10E3/UL (ref 0–1.3)
MONOCYTES NFR BLD AUTO: 7 %
NEUTROPHILS # BLD AUTO: 5.1 10E3/UL (ref 1.6–8.3)
NEUTROPHILS NFR BLD AUTO: 51 %
NRBC # BLD AUTO: 0 10E3/UL
NRBC BLD AUTO-RTO: 0 /100
PLATELET # BLD AUTO: 397 10E3/UL (ref 150–450)
POTASSIUM SERPL-SCNC: 4 MMOL/L (ref 3.4–5.3)
RBC # BLD AUTO: 4.93 10E6/UL (ref 4.4–5.9)
SODIUM SERPL-SCNC: 139 MMOL/L (ref 136–145)
TROPONIN T SERPL HS-MCNC: <6 NG/L
WBC # BLD AUTO: 10.1 10E3/UL (ref 4–11)

## 2023-04-28 PROCEDURE — 93005 ELECTROCARDIOGRAM TRACING: CPT

## 2023-04-28 PROCEDURE — 99284 EMERGENCY DEPT VISIT MOD MDM: CPT

## 2023-04-28 PROCEDURE — 84484 ASSAY OF TROPONIN QUANT: CPT | Performed by: EMERGENCY MEDICINE

## 2023-04-28 PROCEDURE — 80048 BASIC METABOLIC PNL TOTAL CA: CPT | Performed by: EMERGENCY MEDICINE

## 2023-04-28 PROCEDURE — 85004 AUTOMATED DIFF WBC COUNT: CPT | Performed by: EMERGENCY MEDICINE

## 2023-04-28 PROCEDURE — 36415 COLL VENOUS BLD VENIPUNCTURE: CPT | Performed by: EMERGENCY MEDICINE

## 2023-04-28 ASSESSMENT — ACTIVITIES OF DAILY LIVING (ADL): ADLS_ACUITY_SCORE: 35

## 2023-04-29 NOTE — ED TRIAGE NOTES
Pt hasnt felt well for couple days. Pt c/o tightness in his chest and tingling in his left arm. Pt report he feels like his heart is racing.

## 2023-04-29 NOTE — ED PROVIDER NOTES
"  History     Chief Complaint:  Tachycardia       The history is provided by the patient.      Giovani Damian is a 42 year old male with a history of anxiety who presents with tachycardia, chest tightness, and tingling in the left shoulder and fingers. Today he mentions that he is feeling warm and having blurry vision which made him worry about his BP. Due to this, he went to  who recommended that he come to the ED. He reports that his episodes of tachycardia and chest discomfort last roughly 50-60 minutes. Alongside this, Giovani reports increased stress at work. He is on Klonopin for his anxiety. Denies current symptoms in the ED. He mentions that his recent episodes are different compared to his previous anxiety attacks. Denies recent travel. Giovani vapes and was seen in the ED 3 months ago for alcohol withdrawals.    Independent Historian:   None - Patient Only    Review of External Notes: I reviewed his EKG and note from urgent care earlier today.    ROS:  Review of Systems   As in HPI.    Allergies:  Seasonal Allergies     Medications:    Librium  Klonopin  Tar Heel  Atarax    Past Medical History:    Anxiety   Substance abuse  Panic disorder  Alcohol withdrawal  Depression   Rib fracture  Vaping    Past Surgical History:    Jaw fracture surgery  Wrist fracture repair, right  Rib fracture repair, right    Family History:    Father: anxiety, hypertension   Sister(s): anxiety    Social History:  The patient presents to the ED via car.  PCP: No Ref-Primary, Physician     Physical Exam     Patient Vitals for the past 24 hrs:   BP Temp Temp src Pulse Resp SpO2 Height Weight   04/28/23 2035 116/69 -- -- 88 17 96 % -- --   04/28/23 2028 112/71 -- -- 89 12 96 % -- --   04/28/23 2012 123/80 98  F (36.7  C) Temporal 100 18 96 % 1.651 m (5' 5\") 75.2 kg (165 lb 12.6 oz)        Physical Exam  Constitutional: Vital signs reviewed as above  General: Alert, pleasant  HEENT: Moist mucous membranes  Eyes: Pupils are equal, " round, and reactive to light.   Neck: Normal range of motion  Cardiovascular: normal rate, Regular rhythm and normal heart sounds.  No MRG. No calf swelling or tenderness  Pulmonary/Chest: Effort normal and breath sounds normal. No respiratory distress. Patient has no wheezes. Patient has no rales. No chest wall tenderness.   Gastrointestinal: Soft. Positive bowel sounds. No MRG.  Musculoskeletal/Extremities: Full ROM.  Endo: No pitting edema  Neurological: Alert, no focal deficits.  Skin: Skin is warm and dry.   Psychiatric: Pleasant    Emergency Department Course   ECG  ECG taken at 2016, ECG read at 2020  Normal sinus rhythm  Normal ECG   Rate 92 bpm. ID interval 148 ms. QRS duration 100 ms. QT/QTc 364/450 ms. P-R-T axes 59 78 59.     Laboratory:  Labs Ordered and Resulted from Time of ED Arrival to Time of ED Departure   BASIC METABOLIC PANEL - Abnormal       Result Value    Sodium 139      Potassium 4.0      Chloride 103      Carbon Dioxide (CO2) 25      Anion Gap 11      Urea Nitrogen 11.0      Creatinine 1.04      Calcium 9.0      Glucose 108 (*)     GFR Estimate >90     TROPONIN T, HIGH SENSITIVITY - Normal    Troponin T, High Sensitivity <6     CBC WITH PLATELETS AND DIFFERENTIAL    WBC Count 10.1      RBC Count 4.93      Hemoglobin 14.9      Hematocrit 44.2      MCV 90      MCH 30.2      MCHC 33.7      RDW 12.4      Platelet Count 397      % Neutrophils 51      % Lymphocytes 38      % Monocytes 7      % Eosinophils 3      % Basophils 1      % Immature Granulocytes 0      NRBCs per 100 WBC 0      Absolute Neutrophils 5.1      Absolute Lymphocytes 3.8      Absolute Monocytes 0.7      Absolute Eosinophils 0.3      Absolute Basophils 0.1      Absolute Immature Granulocytes 0.0      Absolute NRBCs 0.0        Emergency Department Course & Assessments:        Assessments:  2040 I obtained history and examined the patient as noted above.    Social Determinants of Health affecting care:   None    Disposition:  The  patient was discharged to home.     Impression & Plan      Medical Decision Making:  Patient presents with palpitations and some chest pressure as detailed above.  He does have anxiety but states this feels different.  He does have a history of alcohol use and was here for alcohol withdrawal about 3 months ago but does not have any symptoms now and is not currently drinking.  He does vape nicotine as well.  His EKG here was sinus rhythm without tachycardia or ischemic changes.  His troponin was also undetectable making acute cord syndrome very unlikely in this gentleman who is heart score is 0.  He is also PERC negative making PE unlikely.  Basic labs are also unremarkable.  He did not require any interventions here.  He will continue to monitor symptoms closely and follow-up with his primary care doctor as needed.    Diagnosis:    ICD-10-CM    1. Atypical chest pain  R07.89            Scribe Disclosure:  I, Alan Alva, am serving as a scribe at 10:03 PM on 4/28/2023 to document services personally performed by Samuel Carter MD, based on my observations and the provider's statements to me.   4/28/2023   Samuel Carter MD Walters, Brent Aaron, MD  04/28/23 6502

## 2023-05-01 LAB
ATRIAL RATE - MUSE: 92 BPM
DIASTOLIC BLOOD PRESSURE - MUSE: NORMAL MMHG
INTERPRETATION ECG - MUSE: NORMAL
P AXIS - MUSE: 59 DEGREES
PR INTERVAL - MUSE: 148 MS
QRS DURATION - MUSE: 100 MS
QT - MUSE: 364 MS
QTC - MUSE: 450 MS
R AXIS - MUSE: 78 DEGREES
SYSTOLIC BLOOD PRESSURE - MUSE: NORMAL MMHG
T AXIS - MUSE: 59 DEGREES
VENTRICULAR RATE- MUSE: 92 BPM

## 2023-06-02 ENCOUNTER — HEALTH MAINTENANCE LETTER (OUTPATIENT)
Age: 43
End: 2023-06-02

## 2023-08-15 ENCOUNTER — HOSPITAL ENCOUNTER (EMERGENCY)
Facility: CLINIC | Age: 43
Discharge: LEFT BEFORE DISCHARGE | End: 2023-08-15
Admitting: EMERGENCY MEDICINE
Payer: COMMERCIAL

## 2023-08-15 VITALS
HEART RATE: 115 BPM | DIASTOLIC BLOOD PRESSURE: 62 MMHG | OXYGEN SATURATION: 96 % | RESPIRATION RATE: 18 BRPM | TEMPERATURE: 96.9 F | SYSTOLIC BLOOD PRESSURE: 148 MMHG

## 2023-08-15 PROCEDURE — 99281 EMR DPT VST MAYX REQ PHY/QHP: CPT

## 2023-08-15 NOTE — ED TRIAGE NOTES
Patient presents to the ED requesting help with alcohol abuse. States he wants to stop drinking. Reports his last drink was 1 hour prior to arrival.

## 2023-08-15 NOTE — ED NOTES
"Pt states last drink was an hour PTA. Pt escorted to room with mom. Pt looks at room and states \"Nope, I'm not fucking going in here. I'm leaving.\" Dr. White attempted to confront pt, pt stated \"Im not fucking talking to you\". Pt's mom following pt. Security escorted to lobby and witnessed mom driving pt.   "

## 2023-08-15 NOTE — ED TRIAGE NOTES
"Patient cooperative with cares but repeatedly yells \"what the fuck\" at the blood pressure cuff. Also states during triage \"I'm gonna kill somebody.\" And then states \"No, not really.\" Slams door on the way out of triage.         "

## 2023-08-16 ENCOUNTER — HOSPITAL ENCOUNTER (EMERGENCY)
Facility: CLINIC | Age: 43
Discharge: HOME | End: 2023-08-16
Attending: EMERGENCY MEDICINE | Admitting: EMERGENCY MEDICINE
Payer: COMMERCIAL

## 2023-08-16 VITALS
TEMPERATURE: 98.3 F | SYSTOLIC BLOOD PRESSURE: 150 MMHG | OXYGEN SATURATION: 97 % | RESPIRATION RATE: 18 BRPM | HEART RATE: 110 BPM | DIASTOLIC BLOOD PRESSURE: 100 MMHG

## 2023-08-16 DIAGNOSIS — R79.89 ELEVATED LACTIC ACID LEVEL: ICD-10-CM

## 2023-08-16 DIAGNOSIS — F10.930 ALCOHOL WITHDRAWAL, UNCOMPLICATED (H): ICD-10-CM

## 2023-08-16 LAB
ALBUMIN SERPL BCG-MCNC: 4.7 G/DL (ref 3.5–5.2)
ALP SERPL-CCNC: 90 U/L (ref 40–129)
ALT SERPL W P-5'-P-CCNC: 25 U/L (ref 0–70)
ANION GAP SERPL CALCULATED.3IONS-SCNC: 18 MMOL/L (ref 7–15)
AST SERPL W P-5'-P-CCNC: 35 U/L (ref 0–45)
BASOPHILS # BLD AUTO: 0.1 10E3/UL (ref 0–0.2)
BASOPHILS NFR BLD AUTO: 1 %
BILIRUB SERPL-MCNC: 0.5 MG/DL
BUN SERPL-MCNC: 12.8 MG/DL (ref 6–20)
CALCIUM SERPL-MCNC: 8.7 MG/DL (ref 8.6–10)
CHLORIDE SERPL-SCNC: 100 MMOL/L (ref 98–107)
CREAT SERPL-MCNC: 0.99 MG/DL (ref 0.67–1.17)
DEPRECATED HCO3 PLAS-SCNC: 22 MMOL/L (ref 22–29)
EOSINOPHIL # BLD AUTO: 0.2 10E3/UL (ref 0–0.7)
EOSINOPHIL NFR BLD AUTO: 2 %
ERYTHROCYTE [DISTWIDTH] IN BLOOD BY AUTOMATED COUNT: 13 % (ref 10–15)
ETHANOL SERPL-MCNC: 0.17 G/DL
GFR SERPL CREATININE-BSD FRML MDRD: >90 ML/MIN/1.73M2
GLUCOSE SERPL-MCNC: 105 MG/DL (ref 70–99)
HCT VFR BLD AUTO: 49.9 % (ref 40–53)
HGB BLD-MCNC: 17.1 G/DL (ref 13.3–17.7)
HOLD SPECIMEN: NORMAL
IMM GRANULOCYTES # BLD: 0 10E3/UL
IMM GRANULOCYTES NFR BLD: 0 %
LACTATE SERPL-SCNC: 3.6 MMOL/L (ref 0.7–2)
LACTATE SERPL-SCNC: 4.1 MMOL/L (ref 0.7–2)
LIPASE SERPL-CCNC: 21 U/L (ref 13–60)
LYMPHOCYTES # BLD AUTO: 4.2 10E3/UL (ref 0.8–5.3)
LYMPHOCYTES NFR BLD AUTO: 42 %
MCH RBC QN AUTO: 30.3 PG (ref 26.5–33)
MCHC RBC AUTO-ENTMCNC: 34.3 G/DL (ref 31.5–36.5)
MCV RBC AUTO: 89 FL (ref 78–100)
MONOCYTES # BLD AUTO: 0.7 10E3/UL (ref 0–1.3)
MONOCYTES NFR BLD AUTO: 6 %
NEUTROPHILS # BLD AUTO: 4.9 10E3/UL (ref 1.6–8.3)
NEUTROPHILS NFR BLD AUTO: 49 %
NRBC # BLD AUTO: 0 10E3/UL
NRBC BLD AUTO-RTO: 0 /100
PLATELET # BLD AUTO: 486 10E3/UL (ref 150–450)
POTASSIUM SERPL-SCNC: 4.1 MMOL/L (ref 3.4–5.3)
PROT SERPL-MCNC: 7.2 G/DL (ref 6.4–8.3)
RBC # BLD AUTO: 5.64 10E6/UL (ref 4.4–5.9)
SODIUM SERPL-SCNC: 140 MMOL/L (ref 136–145)
WBC # BLD AUTO: 10.1 10E3/UL (ref 4–11)

## 2023-08-16 PROCEDURE — 96361 HYDRATE IV INFUSION ADD-ON: CPT

## 2023-08-16 PROCEDURE — 83605 ASSAY OF LACTIC ACID: CPT | Performed by: EMERGENCY MEDICINE

## 2023-08-16 PROCEDURE — 96365 THER/PROPH/DIAG IV INF INIT: CPT

## 2023-08-16 PROCEDURE — 250N000009 HC RX 250: Performed by: EMERGENCY MEDICINE

## 2023-08-16 PROCEDURE — 82077 ASSAY SPEC XCP UR&BREATH IA: CPT | Performed by: EMERGENCY MEDICINE

## 2023-08-16 PROCEDURE — 99284 EMERGENCY DEPT VISIT MOD MDM: CPT | Mod: 25

## 2023-08-16 PROCEDURE — 250N000011 HC RX IP 250 OP 636: Mod: JZ | Performed by: EMERGENCY MEDICINE

## 2023-08-16 PROCEDURE — 258N000003 HC RX IP 258 OP 636: Performed by: EMERGENCY MEDICINE

## 2023-08-16 PROCEDURE — 36415 COLL VENOUS BLD VENIPUNCTURE: CPT | Performed by: EMERGENCY MEDICINE

## 2023-08-16 PROCEDURE — 82947 ASSAY GLUCOSE BLOOD QUANT: CPT | Performed by: EMERGENCY MEDICINE

## 2023-08-16 PROCEDURE — 250N000013 HC RX MED GY IP 250 OP 250 PS 637: Performed by: EMERGENCY MEDICINE

## 2023-08-16 PROCEDURE — 85014 HEMATOCRIT: CPT | Performed by: EMERGENCY MEDICINE

## 2023-08-16 PROCEDURE — 83690 ASSAY OF LIPASE: CPT | Performed by: EMERGENCY MEDICINE

## 2023-08-16 RX ORDER — CHLORDIAZEPOXIDE HYDROCHLORIDE 25 MG/1
25 CAPSULE, GELATIN COATED ORAL ONCE
Status: COMPLETED | OUTPATIENT
Start: 2023-08-16 | End: 2023-08-16

## 2023-08-16 RX ORDER — HYDROXYZINE HYDROCHLORIDE 25 MG/1
25 TABLET, FILM COATED ORAL 3 TIMES DAILY PRN
COMMUNITY
End: 2023-08-16

## 2023-08-16 RX ORDER — CHLORDIAZEPOXIDE HYDROCHLORIDE 25 MG/1
25 CAPSULE, GELATIN COATED ORAL 3 TIMES DAILY PRN
Qty: 15 CAPSULE | Refills: 0 | Status: SHIPPED | OUTPATIENT
Start: 2023-08-16 | End: 2023-08-21

## 2023-08-16 RX ORDER — CLONIDINE HYDROCHLORIDE 0.1 MG/1
0.1 TABLET ORAL 2 TIMES DAILY
COMMUNITY

## 2023-08-16 RX ORDER — MIRTAZAPINE 30 MG/1
30 TABLET, FILM COATED ORAL AT BEDTIME
COMMUNITY

## 2023-08-16 RX ADMIN — CHLORDIAZEPOXIDE HYDROCHLORIDE 25 MG: 25 CAPSULE ORAL at 07:16

## 2023-08-16 RX ADMIN — SODIUM CHLORIDE 1000 ML: 9 INJECTION, SOLUTION INTRAVENOUS at 05:21

## 2023-08-16 RX ADMIN — FOLIC ACID: 5 INJECTION, SOLUTION INTRAMUSCULAR; INTRAVENOUS; SUBCUTANEOUS at 06:14

## 2023-08-16 ASSESSMENT — ACTIVITIES OF DAILY LIVING (ADL)
ADLS_ACUITY_SCORE: 35
ADLS_ACUITY_SCORE: 35

## 2023-08-16 NOTE — ED TRIAGE NOTES
"Pt reports concern for withdrawal from ETOH. Reports many months of sobriety, went on 2 day \"constantino\" and drank 8 beers and a few shots each day. No Hx of seizures      Triage Assessment       Row Name 08/16/23 0452       Triage Assessment (Adult)    Airway WDL WDL       Respiratory WDL    Respiratory WDL WDL       Skin Circulation/Temperature WDL    Skin Circulation/Temperature WDL WDL       Cardiac WDL    Cardiac WDL WDL                    "

## 2023-08-16 NOTE — ED PROVIDER NOTES
History     Chief Complaint:  Concern for alcohol withdrawal    HPI   Giovani Damian is a 42 year old male who presents after drinking alcohol fairly consistently for the past 3 days.  He states he has been sober since January.  He has been feeling depressed as his brother  3 years ago around this time of alcohol related issues.  He has not had much to eat in the past 3 days.  He feels shaky and when he closes his eyes states he sees things that might not be real.  No auditory hallucinations.  No vomiting.  His last drink was 6 to 7 hours ago.    Independent Historian:    Patient provides history above    Review of External Notes:  Care everywhere reviewed in epic updated    Medications:    clonazePAM (KLONOPIN) 0.5 MG tablet  cloNIDine (CATAPRES) 0.1 MG tablet  mirtazapine (REMERON) 30 MG tablet        Past Medical History:    Past Medical History:   Diagnosis Date    Alcohol abuse     Anxiety     Depressive disorder     Hypertension     Seasonal allergic rhinitis        Past Surgical History:    Past Surgical History:   Procedure Laterality Date    MOUTH SURGERY            Physical Exam   Patient Vitals for the past 24 hrs:   BP Temp Temp src Pulse Resp SpO2   23 0454 142/107 -- -- 110 -- 97 %   23 0449 -- 98.3  F (36.8  C) Oral -- 20 --        Physical Exam  Nursing note and vitals reviewed.  Constitutional: Cooperative.   HENT:   Mouth/Throat: Mucous membranes are dry.  Cardiovascular: Tachycardic rate, regular rhythm and normal heart sounds.  No murmur.  Pulmonary/Chest: Effort normal and breath sounds normal. No respiratory distress. No wheezes. No rales.   Abdominal: Soft. Normal appearance. There is no tenderness.   Musculoskeletal: Normal range of motion.   Neurological: Alert.  Oriented x4.  No tremor.  Strength normal.  Skin: Skin is warm and dry.   Psychiatric: Normal mood and affect.     Emergency Department Course     Laboratory:  Labs Ordered and Resulted from Time of ED Arrival  to Time of ED Departure   ETHYL ALCOHOL LEVEL - Abnormal       Result Value    Alcohol ethyl 0.17 (*)    COMPREHENSIVE METABOLIC PANEL - Abnormal    Sodium 140      Potassium 4.1      Chloride 100      Carbon Dioxide (CO2) 22      Anion Gap 18 (*)     Urea Nitrogen 12.8      Creatinine 0.99      Calcium 8.7      Glucose 105 (*)     Alkaline Phosphatase 90      AST 35      ALT 25      Protein Total 7.2      Albumin 4.7      Bilirubin Total 0.5      GFR Estimate >90     LACTIC ACID WHOLE BLOOD - Abnormal    Lactic Acid 4.1 (*)    CBC WITH PLATELETS AND DIFFERENTIAL - Abnormal    WBC Count 10.1      RBC Count 5.64      Hemoglobin 17.1      Hematocrit 49.9      MCV 89      MCH 30.3      MCHC 34.3      RDW 13.0      Platelet Count 486 (*)     % Neutrophils 49      % Lymphocytes 42      % Monocytes 6      % Eosinophils 2      % Basophils 1      % Immature Granulocytes 0      NRBCs per 100 WBC 0      Absolute Neutrophils 4.9      Absolute Lymphocytes 4.2      Absolute Monocytes 0.7      Absolute Eosinophils 0.2      Absolute Basophils 0.1      Absolute Immature Granulocytes 0.0      Absolute NRBCs 0.0     LACTIC ACID WHOLE BLOOD - Abnormal    Lactic Acid 3.6 (*)    LIPASE - Normal    Lipase 21          Interventions:  Medications   chlordiazePOXIDE (LIBRIUM) capsule 25 mg (has no administration in time range)   dextrose 5% and 0.9% NaCl 1,000 mL with Infuvite Adult 10 mL, thiamine 100 mg, folic acid 1 mg, potassium chloride 20 mEq, magnesium sulfate 2 g infusion ( Intravenous $New Bag 8/16/23 0614)   0.9% sodium chloride BOLUS (0 mLs Intravenous Stopped 8/16/23 0620)        Assessments:  0505 patient evaluated in room 15.  History obtained and labs as well as fluids ordered above as above    Independent Interpretation (X-rays, CTs, rhythm strip):  None    Consultations/Discussion of Management or Tests:  None       Social Determinants of Health affecting care:  Alcohol use affecting ongoing health.     Disposition:  The  patient was discharged to home.     Impression & Plan      Medical Decision Making:  -year-old demonstrating with ongoing alcohol addiction who presents with intoxication and early signs of withdrawal.  Vital signs are reassuring other than slight tachycardia.  He has received IV fluid in addition to a banana bag.  Lactic acid level was elevated but trending down after the first liter of IV fluid.  He is declining transfer to detox.  Sounds like he did well with Librium in January when he was discharged with similar presentation.  He knows he is always welcome to return to the emergency department should he experience worsening withdrawal symptoms or hallucinations.  I have strongly advised him to seek chemical dependency counseling on an outpatient basis.    Diagnosis:    ICD-10-CM    1. Elevated lactic acid level  R79.89       2. Alcohol withdrawal, uncomplicated (H)  F10.930            Discharge Medications:  New Prescriptions    CHLORDIAZEPOXIDE (LIBRIUM) 25 MG CAPSULE    Take 1 capsule (25 mg) by mouth 3 times daily as needed for withdrawal               Sancho Lafleur MD  08/16/23 0656

## 2024-01-05 ENCOUNTER — HOSPITAL ENCOUNTER (EMERGENCY)
Facility: CLINIC | Age: 44
Discharge: HOME | End: 2024-01-05
Attending: EMERGENCY MEDICINE | Admitting: EMERGENCY MEDICINE
Payer: COMMERCIAL

## 2024-01-05 VITALS
SYSTOLIC BLOOD PRESSURE: 161 MMHG | HEART RATE: 117 BPM | TEMPERATURE: 97.9 F | DIASTOLIC BLOOD PRESSURE: 100 MMHG | OXYGEN SATURATION: 97 % | RESPIRATION RATE: 16 BRPM

## 2024-01-05 VITALS
RESPIRATION RATE: 18 BRPM | DIASTOLIC BLOOD PRESSURE: 104 MMHG | OXYGEN SATURATION: 95 % | SYSTOLIC BLOOD PRESSURE: 139 MMHG | TEMPERATURE: 97.9 F | HEART RATE: 104 BPM

## 2024-01-05 DIAGNOSIS — F19.939 WITHDRAWAL FROM OTHER PSYCHOACTIVE SUBSTANCE (H): ICD-10-CM

## 2024-01-05 DIAGNOSIS — F10.920 ALCOHOLIC INTOXICATION WITHOUT COMPLICATION (H): ICD-10-CM

## 2024-01-05 LAB
ALCOHOL BREATH TEST: 0.25 (ref 0–0.01)
ALCOHOL BREATH TEST: 0.27 (ref 0–0.01)

## 2024-01-05 PROCEDURE — 99283 EMERGENCY DEPT VISIT LOW MDM: CPT | Performed by: EMERGENCY MEDICINE

## 2024-01-05 PROCEDURE — 99285 EMERGENCY DEPT VISIT HI MDM: CPT

## 2024-01-05 PROCEDURE — 250N000013 HC RX MED GY IP 250 OP 250 PS 637: Performed by: EMERGENCY MEDICINE

## 2024-01-05 PROCEDURE — 82075 ASSAY OF BREATH ETHANOL: CPT

## 2024-01-05 PROCEDURE — 99284 EMERGENCY DEPT VISIT MOD MDM: CPT | Performed by: EMERGENCY MEDICINE

## 2024-01-05 PROCEDURE — G2213 INITIAT MED ASSIST TX IN ER: HCPCS | Performed by: EMERGENCY MEDICINE

## 2024-01-05 RX ORDER — LISINOPRIL 10 MG/1
20 TABLET ORAL DAILY
COMMUNITY

## 2024-01-05 RX ORDER — BUPRENORPHINE AND NALOXONE 4; 1 MG/1; MG/1
1 FILM, SOLUBLE BUCCAL; SUBLINGUAL ONCE
Status: COMPLETED | OUTPATIENT
Start: 2024-01-05 | End: 2024-01-05

## 2024-01-05 RX ORDER — OLANZAPINE 10 MG/2ML
5 INJECTION, POWDER, FOR SOLUTION INTRAMUSCULAR DAILY PRN
Status: DISCONTINUED | OUTPATIENT
Start: 2024-01-05 | End: 2024-01-05 | Stop reason: HOSPADM

## 2024-01-05 RX ORDER — BUPRENORPHINE AND NALOXONE 8; 2 MG/1; MG/1
0.5 FILM, SOLUBLE BUCCAL; SUBLINGUAL 4 TIMES DAILY PRN
Qty: 9 FILM | Refills: 0 | Status: SHIPPED | OUTPATIENT
Start: 2024-01-05 | End: 2024-03-07

## 2024-01-05 RX ADMIN — BUPRENORPHINE AND NALOXONE 1 FILM: 4; 1 FILM, SOLUBLE BUCCAL; SUBLINGUAL at 19:17

## 2024-01-05 ASSESSMENT — ACTIVITIES OF DAILY LIVING (ADL)
ADLS_ACUITY_SCORE: 33
ADLS_ACUITY_SCORE: 35
ADLS_ACUITY_SCORE: 33

## 2024-01-05 NOTE — ED PROVIDER NOTES
ED ATTENDING PHYSICIAN NOTE:   I evaluated this patient in conjunction with America Malone PA-C  I have participated in the care of the patient and personally performed key elements of the history, exam, and medical decision making.      HPI:   Giovani Damian is a 43 year old male who presents with alcohol intoxication. He is concerned for withdrawal. He reports he went to work today and his boss drove him here. He reports he had been sober for 7 years, and relapsed last night after having a very stressful day. Later, he mentioned drinking for the past three days. He could not answer when he last drank prior to this episode. Denies any falls or injuries today. He notes his brother  of withdrawal complications.      Independent Historian:   A coworker who dropped the patient off says he has been drinking for 3 days or more.    Review of External Notes:   Discharge summary reviewed from 2023 when the patient was admitted for alcohol intoxication     EXAM:   General: Appears intoxicated, no sign of withdrawal  Cardiac: Regular rhythm, no murmur  Lungs: Clear to auscultation bilaterally, breathing comfortably  GI: Abdomen soft, nontender, nondistended  Neurologic: Slurred speech, grossly moves all extremities well     MEDICAL DECISION MAKING/ASSESSMENT AND PLAN:   This patient's history is somewhat contradictory and that he initially said he been sober for 7 years.  I did point out to him that he is been here twice for alcohol-related issues in the last 12 months.  He says that he had relapses at this point.  He says he has been drinking for today although later he said for possibly 2 or 3 days.  He has not had any withdrawal symptoms recently.    The patient initially wanted to go to detox.  He wanted us to speak with his coworker who dropped him off.  After that the patient was offered detox and at that point refused and tried to run out of the ED despite being heavily intoxicated.  He did require  restraints which will be removed soon as possible.    The patient will be watched until clinically sober.     DIAGNOSIS:     ICD-10-CM    1. Alcoholic intoxication without complication (H24)  F10.920            Scribe Disclosure:  I, Mia Stone, am serving as a scribe at 10:54 AM on 1/5/2024 to document services personally performed by Neri Voss MD based on my observations and the provider's statements to me.     1/5/2024  Neri Voss MD  [facility] EMERGENCY DEPT       Neri Voss MD  01/05/24 9845

## 2024-01-05 NOTE — ED TRIAGE NOTES
"Patient states he has been sober for 7 years but started drinking today. Patient states has had \"a lot\" of Vodka.  Patient's speech is slurred in triage.      Triage Assessment (Adult)       Row Name 01/05/24 1049          Respiratory WDL    Respiratory WDL WDL        Skin Circulation/Temperature WDL    Skin Circulation/Temperature WDL WDL        Cardiac WDL    Cardiac WDL WDL        Peripheral/Neurovascular WDL    Peripheral Neurovascular WDL WDL        Cognitive/Neuro/Behavioral WDL    Cognitive/Neuro/Behavioral WDL WDL                     "

## 2024-01-05 NOTE — ED NOTES
Kristin came to get patient. Patient received his belongings and paperwork and walked out. Refused discharge vitals. Patient alert and oriented. Respirations even and unlabored. All discharge education given. All questions answered. All medications explained in detail. Patient denies further needs and states that they are ready to leave. Patient ambulated out of the ER with steady gait.

## 2024-01-05 NOTE — ED NOTES
Patient attempted to leave he room multiple times to go home because he isn't getting medicated at this time. Patient has no need for medication. Patient does not show any signs of withdrawal. He just appears intoxicated. Security had to help return patient to the room. Patient verbally abusive to staff now. Provider made aware.

## 2024-01-05 NOTE — ED NOTES
"Patient verbally hostile towards PA America. Patient threatening to leave. PA informed patient of KARL. No evidence of understanding. Pt states to PA, \"If I walk out that door, what are you going to do? This is bullshit. What the fuck? I'm a 40 year old man.\" Pt then attempted to leave. Stopped by security and RN in [facility], code 21 called. Pt placed in 5 point restraints. Pt continues to be agitated.   " Stable. Improved.

## 2024-01-05 NOTE — ED PROVIDER NOTES
[facility] EMERGENCY DEPARTMENT ([facility])    24      ED PROVIDER NOTE        History     Chief Complaint   Patient presents with    Alcohol Intoxication     Unsure if he needs detox- drank beer the last few days. Unsure of how many. Was sober previously. hx of withdrawal seizures 10 years ago     HPI  Giovani Damian is a 43 year old male with a history of alcohol abuse and withdrawal, panic disorder, HTN and depression presents to the ED for evaluation.  He says he thinks he needs suboxone.  He says he has hx of alcohol dependence and had been sober for 8 months.  For the past year he has been using kratom and now feels he is addicted to it.  His last use was yesterday morning and he feels like he has the flu. He feels like he needs suboxone.  He was unable to reach out to his provider for help.  He has been drinking etoh for the past 3 days to help with his withdrawal symptoms. He has AA and can continue to work with AA. He doesn't feel like he needs more cd treatment.  He denies si/hi.  A friend brought him here today.  His brother  by etoh use.  He uses klonopin a couple times a month but not more than that.         Past Medical History  Past Medical History:   Diagnosis Date    Alcohol abuse     Anxiety     Depressive disorder     Hypertension     Seasonal allergic rhinitis      Past Surgical History:   Procedure Laterality Date    MOUTH SURGERY       buprenorphine HCl-naloxone HCl (SUBOXONE) 8-2 MG per film  clonazePAM (KLONOPIN) 0.5 MG tablet  cloNIDine (CATAPRES) 0.1 MG tablet  lisinopril (ZESTRIL) 5 MG tablet  mirtazapine (REMERON) 30 MG tablet      No Known Allergies  Family History  Family History   Problem Relation Age of Onset    No Known Problems Mother     No Known Problems Father     No Known Problems Maternal Grandmother     No Known Problems Maternal Grandfather     No Known Problems Paternal Grandmother     No Known Problems Paternal Grandfather     No Known Problems Brother       Social History   Social History     Tobacco Use    Smoking status: Former     Packs/day: 1     Types: Dip, chew, snus or snuff, Cigarettes    Smokeless tobacco: Current    Tobacco comments:     using nicotine pouches- 2/18/20   Substance Use Topics    Alcohol use: Yes     Comment: over 1 litter of wisky and beer a day    Drug use: Yes     Types: Marijuana     Comment: last week         A medically appropriate review of systems was performed with pertinent positives and negatives noted in the HPI, and all other systems negative.    Physical Exam   BP: (!) 158/110  Pulse: 100  Temp: 97.9  F (36.6  C)  Resp: 16  SpO2: 96 %  Physical Exam  Vitals and nursing note reviewed.   HENT:      Head: Normocephalic and atraumatic.      Nose: No congestion or rhinorrhea.   Eyes:      Extraocular Movements: Extraocular movements intact.   Cardiovascular:      Rate and Rhythm: Normal rate.   Pulmonary:      Effort: Pulmonary effort is normal.   Musculoskeletal:         General: No deformity.      Cervical back: Normal range of motion.   Skin:     Coloration: Skin is not jaundiced or pale.   Neurological:      General: No focal deficit present.      Mental Status: He is alert and oriented to person, place, and time.   Psychiatric:         Attention and Perception: Attention and perception normal.         Mood and Affect: Mood is anxious. Affect is tearful.         Speech: Speech normal.         Behavior: Behavior normal. Behavior is cooperative.         Thought Content: Thought content normal.         Cognition and Memory: Cognition and memory normal.         Judgment: Judgment is impulsive.           ED Course, Procedures, & Data      Procedures               Results for orders placed or performed during the hospital encounter of 01/05/24   Alcohol breath test POCT     Status: Abnormal   Result Value Ref Range    Alcohol Breath Test 0.254 (A) 0.00 - 0.01   Results for orders placed or performed during the hospital encounter of  01/05/24   Alcohol breath test POCT     Status: Abnormal   Result Value Ref Range    Alcohol Breath Test 0.27 (A) 0.00 - 0.01     Medications   buprenorphine HCl-naloxone HCl (SUBOXONE) 4-1 MG per film 1 Film (1 Film Sublingual $Given 1/5/24 1917)     Labs Ordered and Resulted from Time of ED Arrival to Time of ED Departure   ALCOHOL BREATH TEST POCT - Abnormal       Result Value    Alcohol Breath Test 0.254 (*)      No orders to display          Critical care was not performed.     Medical Decision Making  The patient's presentation was of moderate complexity (an acute illness with systemic symptoms).    The patient's evaluation involved:  history and exam without other MDM data elements    The patient's management necessitated moderate risk (prescription drug management including medications given in the ED).    Assessment & Plan    Giovani Damian is a 43 year old male with a history of alcohol abuse and withdrawal, panic disorder, HTN and depression presents to the ED for evaluation.  He has been on kratom for the past year and needing help with withdrawals.  Last use was yesterday morning.   Patient wanting to leave without staying long. He says he has a friend to give him a ride. He was given suboxone 4-1 mg and feels better. He wishes to leave.  He doesn't think he'll need to use suboxone long but encouraged to follow up at the Recovery clinic next week.  Sun referral made.   I have reviewed the nursing notes. I have reviewed the findings, diagnosis, plan and need for follow up with the patient.    New Prescriptions    BUPRENORPHINE HCL-NALOXONE HCL (SUBOXONE) 8-2 MG PER FILM    Place 0.5 Film under the tongue 4 times daily as needed (opioid withdrawal)       Final diagnoses:   Withdrawal from other psychoactive substance (H) - kratom dependence       Sunshine Gonzalez MD   [facility] EMERGENCY DEPARTMENT  1/5/2024     Sunshine Gonzalez MD  01/05/24 1940

## 2024-01-05 NOTE — ED NOTES
Patient calm at this time, cooperative, and apologetic. Patient attempting to call for ride again.

## 2024-01-05 NOTE — ED NOTES
Patient continues to yell into the hallway. No evidence of learning. Each time I talk to him is as if he has never heard me explain why he is in restraints and what he needs to do to get out of them.

## 2024-01-05 NOTE — ED NOTES
"Patient continues to argue with staff, unable to contract for safety at this time. Pt states, \" I need to call my ride. My friend Valentin is coming to pick me up at 2 oclock.\" KARL information again explained to patient. No evidence of understanding. Pt states, \"Give me a breathalyzer again. I blew hours ago.\" Updated patient on current plan of care. Cell phone placed in belongings bag, bag placed in DEC office.   "

## 2024-01-05 NOTE — ED PROVIDER NOTES
History     Chief Complaint:  Alcohol Intoxication       HPI   Giovani Damian is a 43 year old male with a history of alcohol abuse and hypertension who presents with alcohol intoxication.  Patient reports 7-year sobriety with relapse yesterday.  Reports that he drank all day yesterday and into the night, reporting vodka usage.  Reports last drink was 4 hours prior to my evaluation (approximately 7 AM).  States he started to drink again due to life stressors.  He denies any other physical health concerns.  Denies any thoughts of hurting himself or others.  Reports primarily concerns for alcohol withdrawal.    Independent Historian:   None - Patient Only    Review of External Notes:   Chart review reveals several emergency department visits in the last year for alcohol intoxication.    Medications:    Klonopin  Catapres  Remeron    Past Medical History:    Alcohol abuse  Anxiety   Depression   Hypertension     Past Surgical History:    Mouth surgery     Physical Exam   Patient Vitals for the past 24 hrs:   BP Temp Temp src Pulse Resp SpO2   01/05/24 1050 (!) 139/104 97.9  F (36.6  C) Oral 104 18 95 %        Physical Exam  BP (!) 139/104   Pulse 104   Temp 97.9  F (36.6  C) (Oral)   Resp 18   SpO2 95%    General: Acutely intoxicated middle-aged male.  Examined in room ED 07.    Head: Atraumatic. Normocephalic.  EENT: PERRL. EOMI. Moist mucus membranes.   CV: Regular rate and rhythm. No appreciable murmurs, rubs, or gallops.   Respiratory: Breathing comfortably on room air. Lungs clear to auscultation bilaterally without wheezes, rhonchi, or rales.  GI: Soft, non-distended. Non-tender abdomen. No rebound, rigidity, or guarding.   Msk: Extremities without tenderness to palpation or deformity.  Skin: Warm and dry. No rashes.  Neuro: Awake, alert, and conversant.  Slurring speech.  Unsteady gait.  Psych: Poor eye contact.  Easily agitated.    Emergency Department Course   Laboratory:  Labs Ordered and Resulted  from Time of ED Arrival to Time of ED Departure   ALCOHOL BREATH TEST POCT - Abnormal       Result Value    Alcohol Breath Test 0.27 (*)      Emergency Department Course & Assessments:  Interventions:  Medications   nicotine Patch in Place (has no administration in time range)   nicotine (NICODERM CQ) 7 MG/24HR 24 hr patch 1 patch (1 patch Transdermal Not Given 24 1326)   OLANZapine (zyPREXA) injection 5 mg (has no administration in time range)      Independent Interpretation (X-rays, CTs, rhythm strip):  None    Assessments/Consultations/Discussion of Management or Tests:  1050    I performed my initial assessment of the patient  1130    I was informed by RN that patient was becoming agitated and attempting to elope.  Patient remains clinically intoxicated and does not have a ride home.  Placed on KARL.  1230    Spoke with patient regarding transfer to detox.  Patient declined.  After I left the room, patient became agitated and attempted to elope.  Code green was called and patient was placed in restraints.    Social Determinants of Health affecting care:   None    Disposition:  The patient was discharged to home.     Impression & Plan    Medical Decision Makin year old male who presents acutely intoxicated.  Reports last drink was 4 hours prior to arrival.  Presented with concerns that he was in withdrawal however continued to be intoxicated during the duration of the emergency department visit.  Differential included intoxication, withdrawal.  He reports several relapses with alcohol use over the last year, but states between these he has been sober.  Reports withdrawal with seizures 7 years ago, but none since then.  Was requesting benzodiazepines, but I explained to the patient that since he is acutely intoxicated, no indication for treatment at this time.  Patient became agitated and did require restraints in the emergency department as well as a hold as he was trying to elope despite being  intoxicated.    Initially, the patient wanted to go to detox, but later changed his mind.  Did offer detox several times but patient continued to decline this.  He did sober up while in the emergency department and will be discharged in the care of his friend.  I discussed the plan with the patient. All questions were answered and they were in agreement the plan. We discussed signs and symptoms that should prompt immediate reevaluation, and they vocalized understanding. Patient is safe for discharge to home.     Diagnosis:    ICD-10-CM    1. Alcoholic intoxication without complication (H24)  F10.920          America Malone PA-C  January 5, 2024  [facility]       America Malone PA-C  01/05/24 1446

## 2024-01-05 NOTE — ED TRIAGE NOTES
Pt unsure if detox is required. States he has drank a lot the last few days but was sober previously. States his brother  of alcohol withdrawal so he is anxious about it. Hx of detox and withdrawal seizures. Reports his last drink was >24 hours ago.     Triage Assessment (Adult)       Row Name [id number]          Triage Assessment    Airway WDL WDL        Respiratory WDL    Respiratory WDL WDL        Skin Circulation/Temperature WDL    Skin Circulation/Temperature WDL WDL        Cardiac WDL    Cardiac WDL WDL        Peripheral/Neurovascular WDL    Peripheral Neurovascular WDL WDL        Cognitive/Neuro/Behavioral WDL    Cognitive/Neuro/Behavioral WDL WDL

## 2024-01-05 NOTE — ED NOTES
Patient continues to come out of the room, patient very upset with staff and security. Patient cursing at us. Explained to patient multiple times that if he can find a sober ride to bring him home, we can discharge him. Patient keeps asking if he can call a Ly[employer] or Uber. Explained to patient that is not acceptable. It has to be someone he knows who can take responsibility for him.

## 2024-01-06 NOTE — ED NOTES
Pt says he drank 5 beers this am and 5 beers yesterday after 5 mos of sobriety. Pt's friend states pt has likely been drinking daily for over a week.

## 2024-01-06 NOTE — DISCHARGE INSTRUCTIONS
Suboxone 4-1 mg (1/2 film) up to 4 times a day for opioid withdrawal.     Follow up with the recovery clinic if needing further refills. They have walk in hours (see below).     [facility]  [address], [address]   [place], [place], [address]  Phone: [phone]  Fax: [phone]    Open Monday-Friday  Closed over lunch hour  Walk in hours: 9am-11:30am and 12:30-3pm    *For [facility] providers, if you'd like to have Recovery Clinic staff reach out to a patient, enter ambulatory order Adult Mental Health  Referral #[id number] for Addiction Medicine or [facility] Referral #[phone] for Addiction Medicine, as appropriate.      Buprenorphine (Suboxone) Home Induction Instructions  (instructions adapted from [facility])      Plan to take a day off and have a place to rest.     Stop using and wait until you feel very sick from the withdrawals (at least 12 hours is best, if using fentanyl it may take a few days). You should have at least 3 of the following:  bad chills or sweating  heavy yawning  joint/bone aches  enlarged pupils  runny nose or watery eyes  feeling restless  goose bumps  anxious or irritable  cramps, nausea, vomiting or diarrhea  twitching/tremors/shakes    Dose one or two 8 mg tablets or strips under your tongue (total dose of 8-16 mg).     Make sure the tablet or film fully dissolves under your tongue (takes about 15 minutes). The medication will not work as well if swallowed into the stomach.     After an hour, repeat the dose (another 8-16 mg) to feel well.     The next day, take 16-32 mg (2-4 tablets or films) at one time.          --  If you have started buprenorphine before:  If it went well, that's great! Just do that again.   If it was difficult, talk with your care team to figure out what happened and find ways to make it better this time. You may need a different dosing plan than what is listed here.     If you have never started  "buprenorphine before:  Gather your support team and if possible take a day off.   You are going to want space to rest. Don't drive.   Using cocaine, methamphetamine, alcohol, or pills makes starting Suboxone/buprenorphine harder, and mixing in alcohol or benzodiazepines can be dangerous.     --  If you have a light habit (e.g. 5 \"Norco 10's\" per day):  Consider a low dose: start with 4 mg and stop and 8 mg total.   WARNING: Withdrawal will continue if you don't take enough buprenorphine.     If you have a heavy habit (e.g. injecting 2 g heroin per day or smoking 1 g fentanyl per day)  Consider a high dose: start with a first dose of 16 mg  For most people, the effects of buprenorphine max out at around 24-32 mg.   WARNING: Too much buprenorphine can make you feel sick and sleepy.    --  Follow up with your addiction specialist as scheduled for further dosing recommendations and medication refills.     [place] [address], [address]  [place] [address]  Phone: [phone]  Fax:  [phone]       Hours:       Monday, Wednesday, Friday     Scheduled visits: 9:00 am - 4:00 pm     Walk-in hours:  9:00 am - 3:00 pm        Tuesday, Thursday     Walk-in only hours: 1:30 pm - 4:00 pm       "

## 2024-01-07 ENCOUNTER — APPOINTMENT (OUTPATIENT)
Dept: GENERAL RADIOLOGY | Facility: CLINIC | Age: 44
End: 2024-01-07
Attending: EMERGENCY MEDICINE
Payer: COMMERCIAL

## 2024-01-07 ENCOUNTER — HOSPITAL ENCOUNTER (EMERGENCY)
Facility: CLINIC | Age: 44
Discharge: HOME | End: 2024-01-07
Attending: EMERGENCY MEDICINE | Admitting: EMERGENCY MEDICINE
Payer: COMMERCIAL

## 2024-01-07 VITALS
RESPIRATION RATE: 15 BRPM | DIASTOLIC BLOOD PRESSURE: 79 MMHG | OXYGEN SATURATION: 95 % | TEMPERATURE: 98.7 F | SYSTOLIC BLOOD PRESSURE: 115 MMHG | HEART RATE: 76 BPM

## 2024-01-07 DIAGNOSIS — R07.9 CHEST PAIN, UNSPECIFIED TYPE: ICD-10-CM

## 2024-01-07 LAB
ANION GAP SERPL CALCULATED.3IONS-SCNC: 11 MMOL/L (ref 7–15)
BASOPHILS # BLD AUTO: 0 10E3/UL (ref 0–0.2)
BASOPHILS NFR BLD AUTO: 1 %
BUN SERPL-MCNC: 17.6 MG/DL (ref 6–20)
CALCIUM SERPL-MCNC: 8.3 MG/DL (ref 8.6–10)
CHLORIDE SERPL-SCNC: 102 MMOL/L (ref 98–107)
CREAT SERPL-MCNC: 0.87 MG/DL (ref 0.67–1.17)
D DIMER PPP FEU-MCNC: <0.27 UG/ML FEU (ref 0–0.5)
DEPRECATED HCO3 PLAS-SCNC: 25 MMOL/L (ref 22–29)
EGFRCR SERPLBLD CKD-EPI 2021: >90 ML/MIN/1.73M2
EOSINOPHIL # BLD AUTO: 0.1 10E3/UL (ref 0–0.7)
EOSINOPHIL NFR BLD AUTO: 2 %
ERYTHROCYTE [DISTWIDTH] IN BLOOD BY AUTOMATED COUNT: 12.5 % (ref 10–15)
GLUCOSE SERPL-MCNC: 115 MG/DL (ref 70–99)
HCT VFR BLD AUTO: 40.3 % (ref 40–53)
HGB BLD-MCNC: 13.4 G/DL (ref 13.3–17.7)
IMM GRANULOCYTES # BLD: 0 10E3/UL
IMM GRANULOCYTES NFR BLD: 0 %
LYMPHOCYTES # BLD AUTO: 1.4 10E3/UL (ref 0.8–5.3)
LYMPHOCYTES NFR BLD AUTO: 25 %
MCH RBC QN AUTO: 29.3 PG (ref 26.5–33)
MCHC RBC AUTO-ENTMCNC: 33.3 G/DL (ref 31.5–36.5)
MCV RBC AUTO: 88 FL (ref 78–100)
MONOCYTES # BLD AUTO: 0.3 10E3/UL (ref 0–1.3)
MONOCYTES NFR BLD AUTO: 5 %
NEUTROPHILS # BLD AUTO: 3.8 10E3/UL (ref 1.6–8.3)
NEUTROPHILS NFR BLD AUTO: 67 %
NRBC # BLD AUTO: 0 10E3/UL
NRBC BLD AUTO-RTO: 0 /100
PLATELET # BLD AUTO: 272 10E3/UL (ref 150–450)
POTASSIUM SERPL-SCNC: 4.5 MMOL/L (ref 3.4–5.3)
RBC # BLD AUTO: 4.57 10E6/UL (ref 4.4–5.9)
SODIUM SERPL-SCNC: 138 MMOL/L (ref 135–145)
TROPONIN T SERPL HS-MCNC: 10 NG/L
TROPONIN T SERPL HS-MCNC: 12 NG/L
WBC # BLD AUTO: 5.6 10E3/UL (ref 4–11)

## 2024-01-07 PROCEDURE — 93005 ELECTROCARDIOGRAM TRACING: CPT

## 2024-01-07 PROCEDURE — 71046 X-RAY EXAM CHEST 2 VIEWS: CPT

## 2024-01-07 PROCEDURE — 85379 FIBRIN DEGRADATION QUANT: CPT | Performed by: EMERGENCY MEDICINE

## 2024-01-07 PROCEDURE — 36415 COLL VENOUS BLD VENIPUNCTURE: CPT | Performed by: EMERGENCY MEDICINE

## 2024-01-07 PROCEDURE — 84484 ASSAY OF TROPONIN QUANT: CPT | Performed by: EMERGENCY MEDICINE

## 2024-01-07 PROCEDURE — 85025 COMPLETE CBC W/AUTO DIFF WBC: CPT | Performed by: EMERGENCY MEDICINE

## 2024-01-07 PROCEDURE — 96360 HYDRATION IV INFUSION INIT: CPT | Mod: 59

## 2024-01-07 PROCEDURE — 80048 BASIC METABOLIC PNL TOTAL CA: CPT | Performed by: EMERGENCY MEDICINE

## 2024-01-07 PROCEDURE — 250N000009 HC RX 250: Performed by: EMERGENCY MEDICINE

## 2024-01-07 PROCEDURE — 250N000013 HC RX MED GY IP 250 OP 250 PS 637: Performed by: EMERGENCY MEDICINE

## 2024-01-07 PROCEDURE — 99285 EMERGENCY DEPT VISIT HI MDM: CPT | Mod: 25

## 2024-01-07 PROCEDURE — 258N000003 HC RX IP 258 OP 636: Performed by: EMERGENCY MEDICINE

## 2024-01-07 PROCEDURE — 96361 HYDRATE IV INFUSION ADD-ON: CPT

## 2024-01-07 RX ORDER — DIAZEPAM 5 MG
10 TABLET ORAL ONCE
Status: COMPLETED | OUTPATIENT
Start: 2024-01-07 | End: 2024-01-07

## 2024-01-07 RX ORDER — MAGNESIUM HYDROXIDE/ALUMINUM HYDROXICE/SIMETHICONE 120; 1200; 1200 MG/30ML; MG/30ML; MG/30ML
15 SUSPENSION ORAL ONCE
Status: COMPLETED | OUTPATIENT
Start: 2024-01-07 | End: 2024-01-07

## 2024-01-07 RX ORDER — LIDOCAINE HYDROCHLORIDE 20 MG/ML
15 SOLUTION OROPHARYNGEAL ONCE
Status: COMPLETED | OUTPATIENT
Start: 2024-01-07 | End: 2024-01-07

## 2024-01-07 RX ADMIN — LIDOCAINE HYDROCHLORIDE 15 ML: 20 SOLUTION ORAL at 15:26

## 2024-01-07 RX ADMIN — ALUMINUM HYDROXIDE, MAGNESIUM HYDROXIDE, AND SIMETHICONE 15 ML: 200; 200; 20 SUSPENSION ORAL at 15:25

## 2024-01-07 RX ADMIN — DIAZEPAM 10 MG: 5 TABLET ORAL at 15:25

## 2024-01-07 RX ADMIN — SODIUM CHLORIDE, POTASSIUM CHLORIDE, SODIUM LACTATE AND CALCIUM CHLORIDE 1000 ML: 600; 310; 30; 20 INJECTION, SOLUTION INTRAVENOUS at 15:25

## 2024-01-07 ASSESSMENT — ACTIVITIES OF DAILY LIVING (ADL)
ADLS_ACUITY_SCORE: 35
ADLS_ACUITY_SCORE: 35

## 2024-01-07 NOTE — ED NOTES
pt comes in from home with 10/10 chest pain, SOB and anxiety. pt chewed 324 mg of asa and got 0.4 nitro x3. that brought his pain from 10 down to a 3/10. pt quit drinking on driday after beingn on a week constantino.

## 2024-01-07 NOTE — ED PROVIDER NOTES
History     Chief Complaint:  Chest Pain and Shortness of Breath     The history is provided by the patient.      Giovani Damian is a 43 year old male with history of hypertension and alcohol use disorder who presents to the ED via EMS for evaluation of chest pain and shortness of breath. The patient reports that he developed central chest pain 45 minutes ago while on the phone. He states he took an aspirin and then called 911. Reports that he also felt short of breath and palpitations.  His pain does not radiate and he states it isn't as severe now. Notes that he was sober for 5 months, but he recently stopped drinking after a week long constantino. During this constantino he was drinking fireball and beer. Denies hemoptysis or recent travel. Denies history of PE, DVT, or cancer.    Independent Historian:    None     Review of External Notes:  None    Medications:    Suboxone  Klonopin  Catapres  Zestril  Norco   Remeron  Vistaril   Atarax     Past Medical History:    Alcohol use disorder  Anxiety  Hypertension  Depression  Panic disorder  Alcohol withdrawal syndrome  Seasonal allergic rhinitis     Past Surgical History:    Mouth surgery  Right wrist fracture surgery  Right closed right fracture surgery  Right jaw fracture surgery     Physical Exam   Patient Vitals for the past 24 hrs:   BP Temp Temp src Pulse Resp SpO2   01/07/24 1559 -- -- -- 76 15 95 %   01/07/24 1544 115/79 -- -- 84 11 95 %   01/07/24 1529 115/81 -- -- 93 11 98 %   01/07/24 1514 117/85 -- -- 88 12 97 %   01/07/24 1507 -- 98.7  F (37.1  C) Oral -- -- --   01/07/24 1449 115/84 -- -- 92 20 96 %   01/07/24 1444 115/84 -- -- 107 -- 99 %      Physical Exam      HEENT:    Oropharynx is moist  Eyes:    Conjunctiva normal  Neck:     Supple, no meningismus.     CV:     Regular rate and rhythm.      No murmurs, rubs or gallops.       No unilateral leg swelling.       2+ radial pulses bilateral.       No lower extremity edema.  PULM:    Clear to auscultation  bilateral.       No respiratory distress.      Good air exchange.     No rales or wheezing.     No stridor.  ABD:    Soft, non-tender, non-distended.       No pulsatile masses.       No rebound, guarding or rigidity.  MSK:     No gross deformity to all four extremities.   LYMPH:   No cervical lymphadenopathy.  NEURO:   Alert & O x 3  No tremor.   Good muscle tone, no atrophy.  Skin:    Warm, dry and intact.    Psych:    Mood is good and affect is appropriate.      Emergency Department Course   ECG  ECG taken at 1514, ECG read at 1516  Normal sinus rhythm   When compared with prior ECG, dated 4/28/23, no significant change was noted  Rate 89 bpm. AL interval 142 ms. QRS duration 80 ms. QT/QTc 372/452 ms. P-R-T axes 36 68 58.    Imaging:  Chest XR,  PA & LAT   Final Result   IMPRESSION: Negative chest.        Report per radiology    Laboratory:  Labs Ordered and Resulted from Time of ED Arrival to Time of ED Departure   BASIC METABOLIC PANEL - Abnormal       Result Value    Sodium 138      Potassium 4.5      Chloride 102      Carbon Dioxide (CO2) 25      Anion Gap 11      Urea Nitrogen 17.6      Creatinine 0.87      GFR Estimate >90      Calcium 8.3 (*)     Glucose 115 (*)    D DIMER QUANTITATIVE - Normal    D-Dimer Quantitative <0.27     TROPONIN T, HIGH SENSITIVITY - Normal    Troponin T, High Sensitivity 12     TROPONIN T, HIGH SENSITIVITY - Normal    Troponin T, High Sensitivity 10     CBC WITH PLATELETS AND DIFFERENTIAL    WBC Count 5.6      RBC Count 4.57      Hemoglobin 13.4      Hematocrit 40.3      MCV 88      MCH 29.3      MCHC 33.3      RDW 12.5      Platelet Count 272      % Neutrophils 67      % Lymphocytes 25      % Monocytes 5      % Eosinophils 2      % Basophils 1      % Immature Granulocytes 0      NRBCs per 100 WBC 0      Absolute Neutrophils 3.8      Absolute Lymphocytes 1.4      Absolute Monocytes 0.3      Absolute Eosinophils 0.1      Absolute Basophils 0.0      Absolute Immature Granulocytes 0.0       Absolute NRBCs 0.0            Emergency Department Course & Assessments:     Interventions:  Medications   lactated ringers BOLUS 1,000 mL (0 mLs Intravenous Stopped 24 1810)   alum & mag hydroxide-simethicone (MAALOX) suspension 15 mL (15 mLs Oral $Given 24 1525)   lidocaine (viscous) (XYLOCAINE) 2 % solution 15 mL (15 mLs Mouth/Throat $Given 24 1526)   diazepam (VALIUM) tablet 10 mg (10 mg Oral $Given 24 1525)      Independent Interpretation (X-rays, CTs, rhythm strip):  I independently reviewed the chest XR in which there is no pneumothorax or focal infiltrate.    Assessments/Consultations/Discussion of Management or Tests:  ED Course as of 24 1828   Sun 2024   1448 I obtained history and examined the patient as noted above.      Social Determinants of Health affecting care:  None     Disposition:  The patient was discharged to home.     Impression & Plan        Medical Decision Makin-year-old male presents with an abrupt onset of chest pain 1 hour prior to arrival.  EKG without ischemic changes.  Troponin within normal limits.  Given the recent onset of symptoms, patient underwent 2-hour delta troponin and is negative.  Very low suspicion for pulmonary embolism.  Due to presenting tachycardia, D-dimer sent and negative thus no indication for CT scan of the chest.  No features concerning for aortic dissection or aortic aneurysm.  Chest pain may in part be related to alcohol induced esophagitis/gastritis.  Patient given GI cocktail.  He was also given a dose of Valium to prevent any developing alcohol withdrawal although no clinical/overt signs of alcohol withdrawal during evaluation.  Patient safe to discharge home with close follow-up with PCP.    Diagnosis:    ICD-10-CM    1. Chest pain, unspecified type  R07.9            Discharge Medications:  Discharge Medication List as of 2024  6:10 PM         Scribe Disclosure:  YULIA SEE, am serving as a scribe at  2:49 PM on 1/7/2024 to document services personally performed by Yonatan Wallace MD based on my observations and the provider's statements to me.    1/7/2024   Yonatan Wallace MD Matthews, Jeremiah R, MD  01/07/24 1831

## 2024-01-08 LAB
ATRIAL RATE - MUSE: 89 BPM
DIASTOLIC BLOOD PRESSURE - MUSE: NORMAL MMHG
INTERPRETATION ECG - MUSE: NORMAL
P AXIS - MUSE: 36 DEGREES
PR INTERVAL - MUSE: 142 MS
QRS DURATION - MUSE: 80 MS
QT - MUSE: 372 MS
QTC - MUSE: 452 MS
R AXIS - MUSE: 68 DEGREES
SYSTOLIC BLOOD PRESSURE - MUSE: NORMAL MMHG
T AXIS - MUSE: 58 DEGREES
VENTRICULAR RATE- MUSE: 89 BPM

## 2024-01-24 ENCOUNTER — HOSPITAL ENCOUNTER (EMERGENCY)
Facility: CLINIC | Age: 44
Discharge: HOME | End: 2024-01-24
Attending: EMERGENCY MEDICINE | Admitting: EMERGENCY MEDICINE
Payer: COMMERCIAL

## 2024-01-24 VITALS
DIASTOLIC BLOOD PRESSURE: 106 MMHG | HEART RATE: 94 BPM | RESPIRATION RATE: 22 BRPM | TEMPERATURE: 98.4 F | OXYGEN SATURATION: 96 % | SYSTOLIC BLOOD PRESSURE: 152 MMHG

## 2024-01-24 DIAGNOSIS — F10.920 ALCOHOLIC INTOXICATION WITHOUT COMPLICATION (H): ICD-10-CM

## 2024-01-24 LAB
ALBUMIN SERPL BCG-MCNC: 4.2 G/DL (ref 3.5–5.2)
ALP SERPL-CCNC: 102 U/L (ref 40–150)
ALT SERPL W P-5'-P-CCNC: 100 U/L (ref 0–70)
ANION GAP SERPL CALCULATED.3IONS-SCNC: 14 MMOL/L (ref 7–15)
AST SERPL W P-5'-P-CCNC: 161 U/L (ref 0–45)
BASOPHILS # BLD AUTO: 0.1 10E3/UL (ref 0–0.2)
BASOPHILS NFR BLD AUTO: 2 %
BILIRUB SERPL-MCNC: 0.4 MG/DL
BUN SERPL-MCNC: 11 MG/DL (ref 6–20)
CALCIUM SERPL-MCNC: 8.2 MG/DL (ref 8.6–10)
CHLORIDE SERPL-SCNC: 99 MMOL/L (ref 98–107)
CREAT SERPL-MCNC: 0.95 MG/DL (ref 0.67–1.17)
DEPRECATED HCO3 PLAS-SCNC: 24 MMOL/L (ref 22–29)
EGFRCR SERPLBLD CKD-EPI 2021: >90 ML/MIN/1.73M2
EOSINOPHIL # BLD AUTO: 0.1 10E3/UL (ref 0–0.7)
EOSINOPHIL NFR BLD AUTO: 2 %
ERYTHROCYTE [DISTWIDTH] IN BLOOD BY AUTOMATED COUNT: 12.7 % (ref 10–15)
ETHANOL SERPL-MCNC: 0.2 G/DL
GLUCOSE SERPL-MCNC: 117 MG/DL (ref 70–99)
HCT VFR BLD AUTO: 44.4 % (ref 40–53)
HGB BLD-MCNC: 15.4 G/DL (ref 13.3–17.7)
HOLD SPECIMEN: NORMAL
IMM GRANULOCYTES # BLD: 0 10E3/UL
IMM GRANULOCYTES NFR BLD: 0 %
LIPASE SERPL-CCNC: 25 U/L (ref 13–60)
LYMPHOCYTES # BLD AUTO: 2.4 10E3/UL (ref 0.8–5.3)
LYMPHOCYTES NFR BLD AUTO: 51 %
MAGNESIUM SERPL-MCNC: 2 MG/DL (ref 1.7–2.3)
MCH RBC QN AUTO: 30.3 PG (ref 26.5–33)
MCHC RBC AUTO-ENTMCNC: 34.7 G/DL (ref 31.5–36.5)
MCV RBC AUTO: 87 FL (ref 78–100)
MONOCYTES # BLD AUTO: 0.4 10E3/UL (ref 0–1.3)
MONOCYTES NFR BLD AUTO: 8 %
NEUTROPHILS # BLD AUTO: 1.7 10E3/UL (ref 1.6–8.3)
NEUTROPHILS NFR BLD AUTO: 37 %
NRBC # BLD AUTO: 0 10E3/UL
NRBC BLD AUTO-RTO: 0 /100
PLATELET # BLD AUTO: 309 10E3/UL (ref 150–450)
POTASSIUM SERPL-SCNC: 4.3 MMOL/L (ref 3.4–5.3)
PROT SERPL-MCNC: 6.6 G/DL (ref 6.4–8.3)
RBC # BLD AUTO: 5.09 10E6/UL (ref 4.4–5.9)
SODIUM SERPL-SCNC: 137 MMOL/L (ref 135–145)
WBC # BLD AUTO: 4.6 10E3/UL (ref 4–11)

## 2024-01-24 PROCEDURE — 80053 COMPREHEN METABOLIC PANEL: CPT | Performed by: EMERGENCY MEDICINE

## 2024-01-24 PROCEDURE — 258N000003 HC RX IP 258 OP 636: Performed by: EMERGENCY MEDICINE

## 2024-01-24 PROCEDURE — 96361 HYDRATE IV INFUSION ADD-ON: CPT

## 2024-01-24 PROCEDURE — 36415 COLL VENOUS BLD VENIPUNCTURE: CPT | Performed by: EMERGENCY MEDICINE

## 2024-01-24 PROCEDURE — 83690 ASSAY OF LIPASE: CPT | Performed by: EMERGENCY MEDICINE

## 2024-01-24 PROCEDURE — 85025 COMPLETE CBC W/AUTO DIFF WBC: CPT | Performed by: EMERGENCY MEDICINE

## 2024-01-24 PROCEDURE — 99283 EMERGENCY DEPT VISIT LOW MDM: CPT | Mod: 25

## 2024-01-24 PROCEDURE — 82077 ASSAY SPEC XCP UR&BREATH IA: CPT | Performed by: EMERGENCY MEDICINE

## 2024-01-24 PROCEDURE — 83735 ASSAY OF MAGNESIUM: CPT | Performed by: EMERGENCY MEDICINE

## 2024-01-24 PROCEDURE — 250N000013 HC RX MED GY IP 250 OP 250 PS 637: Performed by: EMERGENCY MEDICINE

## 2024-01-24 PROCEDURE — 96360 HYDRATION IV INFUSION INIT: CPT

## 2024-01-24 RX ORDER — LORAZEPAM 0.5 MG/1
0.5 TABLET ORAL ONCE
Status: COMPLETED | OUTPATIENT
Start: 2024-01-24 | End: 2024-01-24

## 2024-01-24 RX ORDER — CHLORDIAZEPOXIDE HYDROCHLORIDE 25 MG/1
25 CAPSULE, GELATIN COATED ORAL 4 TIMES DAILY PRN
Qty: 20 CAPSULE | Refills: 0 | Status: SHIPPED | OUTPATIENT
Start: 2024-01-24 | End: 2024-03-07

## 2024-01-24 RX ADMIN — LORAZEPAM 0.5 MG: 0.5 TABLET ORAL at 08:09

## 2024-01-24 RX ADMIN — SODIUM CHLORIDE 1000 ML: 9 INJECTION, SOLUTION INTRAVENOUS at 07:23

## 2024-01-24 ASSESSMENT — LIFESTYLE VARIABLES
TOTAL SCORE: 4
PAROXYSMAL SWEATS: BARELY PERCEPTIBLE SWEATING, PALMS MOIST
NAUSEA AND VOMITING: MILD NAUSEA WITH NO VOMITING
ANXIETY: MILDLY ANXIOUS
ORIENTATION AND CLOUDING OF SENSORIUM: ORIENTED AND CAN DO SERIAL ADDITIONS
AGITATION: NORMAL ACTIVITY
TREMOR: NO TREMOR
VISUAL DISTURBANCES: NOT PRESENT
HEADACHE, FULLNESS IN HEAD: VERY MILD
AUDITORY DISTURBANCES: NOT PRESENT

## 2024-01-24 ASSESSMENT — ACTIVITIES OF DAILY LIVING (ADL)
ADLS_ACUITY_SCORE: 33
ADLS_ACUITY_SCORE: 35

## 2024-01-24 NOTE — ED PROVIDER NOTES
History     Chief Complaint:  Withdrawal     The history is provided by the patient.      Giovani Damian is a 43 year old male with history of hypertension, alcohol use disorder, and alcohol withdrawal syndrome who presents to the ED for evaluation of withdrawal symptoms. Giovani reports his last drink was last night and developed nausea, tremors, and anxiety since. He was sober prior to this. Denies fevers, chills, cough, rhinorrhea, abdominal pain, vomiting, bowel or bladder symptoms, or decreased PO intake. Notes his brother passed away from alcohol withdrawal so he is cautious in this regard.    Independent Historian:   None - Patient Only    Review of External Notes:       Medications:    Suboxone  Klonopin  Catapres  Zestril  Norco   Remeron  Vistaril   Atarax      Past Medical History:    Alcohol use disorder  Anxiety  Hypertension  Depression  Panic disorder  Alcohol withdrawal syndrome  Seasonal allergic rhinitis      Past Surgical History:    Mouth surgery  Right wrist fracture surgery  Right closed right fracture surgery  Right jaw fracture surgery     Physical Exam   Patient Vitals for the past 24 hrs:   BP Temp Temp src Pulse Resp SpO2   01/24/24 0945 (!) 152/106 -- -- 94 -- --   01/24/24 0800 (!) 144/89 -- -- 92 -- 96 %   01/24/24 0745 138/89 -- -- 90 -- 94 %   01/24/24 0730 (!) 139/96 -- -- 90 -- 92 %   01/24/24 0725 (!) 139/102 -- -- 94 -- 94 %   01/24/24 0625 (!) 142/113 98.4  F (36.9  C) Temporal 109 22 97 %     Physical Exam  Constitutional: Well appearing.  HEENT: Atraumatic.  PERRL.  EOMI.  Moist mucous membranes.  Neck: Soft.  Supple.    Cardiac: Regular rate and rhythm.  No murmur or rub.  Respiratory: Clear to auscultation bilaterally.  No respiratory distress.  No wheezing, rhonchi, or rales.  Abdomen: Soft and nontender.  No guarding.  Nondistended.  Musculoskeletal: No edema.  Normal range of motion.  Neurologic: Alert and oriented x3.  Normal tone and bulk.  Normal gait.  Skin: No  rashes.  No edema.  Psych: Normal affect.  Normal behavior.            Emergency Department Course     Laboratory:  Labs Ordered and Resulted from Time of ED Arrival to Time of ED Departure   COMPREHENSIVE METABOLIC PANEL - Abnormal       Result Value    Sodium 137      Potassium 4.3      Carbon Dioxide (CO2) 24      Anion Gap 14      Urea Nitrogen 11.0      Creatinine 0.95      GFR Estimate >90      Calcium 8.2 (*)     Chloride 99      Glucose 117 (*)     Alkaline Phosphatase 102       (*)      (*)     Protein Total 6.6      Albumin 4.2      Bilirubin Total 0.4     ETHYL ALCOHOL LEVEL - Abnormal    Alcohol ethyl 0.20 (*)    LIPASE - Normal    Lipase 25     MAGNESIUM - Normal    Magnesium 2.0     CBC WITH PLATELETS AND DIFFERENTIAL    WBC Count 4.6      RBC Count 5.09      Hemoglobin 15.4      Hematocrit 44.4      MCV 87      MCH 30.3      MCHC 34.7      RDW 12.7      Platelet Count 309      % Neutrophils 37      % Lymphocytes 51      % Monocytes 8      % Eosinophils 2      % Basophils 2      % Immature Granulocytes 0      NRBCs per 100 WBC 0      Absolute Neutrophils 1.7      Absolute Lymphocytes 2.4      Absolute Monocytes 0.4      Absolute Eosinophils 0.1      Absolute Basophils 0.1      Absolute Immature Granulocytes 0.0      Absolute NRBCs 0.0          Procedures   None    Emergency Department Course & Assessments:  Assessments/Consultations/Discussion of Management or Tests:  0733 I obtained history and examined the patient as noted above.   1013 I rechecked the patient and explained findings. We discussed plans for discharge and the patient is comfortable with this plan. Patient has a ride home.       Interventions:  Medications   sodium chloride 0.9% BOLUS 1,000 mL (0 mLs Intravenous Stopped 1/24/24 0854)   LORazepam (ATIVAN) tablet 0.5 mg (0.5 mg Oral $Given 1/24/24 0809)     Independent Interpretation (X-rays, CTs, rhythm strip):  None    Social Determinants of Health affecting care:    None    Disposition:  The patient was discharged to home.     Impression & Plan    CMS Diagnoses: None    Medical Decision Making:  Giovani Damian is a 42-year-old man who is afebrile and hemodynamically stable.  Lab work was noted as above.  His elevated serum ethanol.  Liver enzymes mildly elevated likely secondary to his alcohol use.  No cysts abdominal pain that require imaging.  He is given symptomatic control as above.  He is requesting discharge.  I offered attempts to place him in detox, however, he declined and states he wants to go home.  He is of sound mind and able to make his own informed decisions.  I welcomed him to return if this does not go well he changes mind about going to detox.  He was in no distress at time of discharge.    Diagnosis:    ICD-10-CM    1. Alcoholic intoxication without complication (H24)  F10.920           Discharge Medications:  Discharge Medication List as of 1/24/2024 10:17 AM        START taking these medications    Details   chlordiazePOXIDE (LIBRIUM) 25 MG capsule Take 1 capsule (25 mg) by mouth 4 times daily as needed for withdrawal, Disp-20 capsule, R-0, Local Print              Scribe Disclosure:  Carlita SEE Hailie, am serving as a scribe at 7:06 AM on 1/24/2024 to document services personally performed by Orlin Kennedy MD based on my observations and the provider's statements to me.  1/24/2024   Orlin Kennedy MD Salay, Nicholas J, MD  02/05/24 5591

## 2024-01-24 NOTE — PROGRESS NOTES
Pt put on call light anxious and requesting to leave. Took IV out. Bleeding from site. Site cleaned and bandaged. Discharge paperwork and prescription provided.

## 2024-01-24 NOTE — DISCHARGE INSTRUCTIONS

## 2024-01-24 NOTE — ED TRIAGE NOTES
"States last drink was \"sometime yesterday night.\"  Would not elaborate on amounts or kind of drinks.  Reports \"shakes and just not feeling good.\"  Reports abdominal pain.  Denies hx of withdrawal sz.      Triage Assessment (Adult)       Row Name 01/24/24 0626          Triage Assessment    Airway WDL WDL        Respiratory WDL    Respiratory WDL WDL        Cardiac WDL    Cardiac WDL WDL        Peripheral/Neurovascular WDL    Peripheral Neurovascular WDL WDL        Cognitive/Neuro/Behavioral WDL    Cognitive/Neuro/Behavioral WDL WDL                     "

## 2024-01-25 ENCOUNTER — MEDICAL CORRESPONDENCE (OUTPATIENT)
Dept: EMERGENCY MEDICINE | Facility: CLINIC | Age: 44
End: 2024-01-25

## 2024-01-25 ENCOUNTER — HOSPITAL ENCOUNTER (EMERGENCY)
Facility: CLINIC | Age: 44
Discharge: HOME | End: 2024-01-25
Attending: EMERGENCY MEDICINE | Admitting: EMERGENCY MEDICINE
Payer: COMMERCIAL

## 2024-01-25 VITALS
DIASTOLIC BLOOD PRESSURE: 102 MMHG | RESPIRATION RATE: 20 BRPM | HEART RATE: 105 BPM | OXYGEN SATURATION: 95 % | TEMPERATURE: 98.3 F | SYSTOLIC BLOOD PRESSURE: 142 MMHG

## 2024-01-25 DIAGNOSIS — F10.920 ALCOHOLIC INTOXICATION WITHOUT COMPLICATION (H): ICD-10-CM

## 2024-01-25 LAB
ALBUMIN SERPL BCG-MCNC: 4.5 G/DL (ref 3.5–5.2)
ALP SERPL-CCNC: 132 U/L (ref 40–150)
ALT SERPL W P-5'-P-CCNC: 149 U/L (ref 0–70)
ANION GAP SERPL CALCULATED.3IONS-SCNC: 14 MMOL/L (ref 7–15)
AST SERPL W P-5'-P-CCNC: 197 U/L (ref 0–45)
BASOPHILS # BLD AUTO: 0.1 10E3/UL (ref 0–0.2)
BASOPHILS NFR BLD AUTO: 2 %
BILIRUB SERPL-MCNC: 0.5 MG/DL
BUN SERPL-MCNC: 12.8 MG/DL (ref 6–20)
CALCIUM SERPL-MCNC: 8.5 MG/DL (ref 8.6–10)
CHLORIDE SERPL-SCNC: 104 MMOL/L (ref 98–107)
CREAT SERPL-MCNC: 0.98 MG/DL (ref 0.67–1.17)
DEPRECATED HCO3 PLAS-SCNC: 23 MMOL/L (ref 22–29)
EGFRCR SERPLBLD CKD-EPI 2021: >90 ML/MIN/1.73M2
EOSINOPHIL # BLD AUTO: 0.1 10E3/UL (ref 0–0.7)
EOSINOPHIL NFR BLD AUTO: 1 %
ERYTHROCYTE [DISTWIDTH] IN BLOOD BY AUTOMATED COUNT: 12.8 % (ref 10–15)
ETHANOL SERPL-MCNC: 0.36 G/DL
GLUCOSE SERPL-MCNC: 120 MG/DL (ref 70–99)
HCT VFR BLD AUTO: 47.8 % (ref 40–53)
HGB BLD-MCNC: 16.4 G/DL (ref 13.3–17.7)
HOLD SPECIMEN: NORMAL
HOLD SPECIMEN: NORMAL
IMM GRANULOCYTES # BLD: 0 10E3/UL
IMM GRANULOCYTES NFR BLD: 0 %
LYMPHOCYTES # BLD AUTO: 2.2 10E3/UL (ref 0.8–5.3)
LYMPHOCYTES NFR BLD AUTO: 40 %
MAGNESIUM SERPL-MCNC: 2.3 MG/DL (ref 1.7–2.3)
MCH RBC QN AUTO: 29.7 PG (ref 26.5–33)
MCHC RBC AUTO-ENTMCNC: 34.3 G/DL (ref 31.5–36.5)
MCV RBC AUTO: 86 FL (ref 78–100)
MONOCYTES # BLD AUTO: 0.3 10E3/UL (ref 0–1.3)
MONOCYTES NFR BLD AUTO: 6 %
NEUTROPHILS # BLD AUTO: 2.8 10E3/UL (ref 1.6–8.3)
NEUTROPHILS NFR BLD AUTO: 51 %
NRBC # BLD AUTO: 0 10E3/UL
NRBC BLD AUTO-RTO: 0 /100
PHOSPHATE SERPL-MCNC: 3 MG/DL (ref 2.5–4.5)
PLATELET # BLD AUTO: 376 10E3/UL (ref 150–450)
POTASSIUM SERPL-SCNC: 4.4 MMOL/L (ref 3.4–5.3)
PROT SERPL-MCNC: 7.1 G/DL (ref 6.4–8.3)
RBC # BLD AUTO: 5.53 10E6/UL (ref 4.4–5.9)
SODIUM SERPL-SCNC: 141 MMOL/L (ref 135–145)
WBC # BLD AUTO: 5.5 10E3/UL (ref 4–11)

## 2024-01-25 PROCEDURE — 250N000013 HC RX MED GY IP 250 OP 250 PS 637: Performed by: EMERGENCY MEDICINE

## 2024-01-25 PROCEDURE — 82077 ASSAY SPEC XCP UR&BREATH IA: CPT | Performed by: EMERGENCY MEDICINE

## 2024-01-25 PROCEDURE — 83735 ASSAY OF MAGNESIUM: CPT | Performed by: EMERGENCY MEDICINE

## 2024-01-25 PROCEDURE — 84100 ASSAY OF PHOSPHORUS: CPT | Performed by: EMERGENCY MEDICINE

## 2024-01-25 PROCEDURE — 258N000003 HC RX IP 258 OP 636: Performed by: EMERGENCY MEDICINE

## 2024-01-25 PROCEDURE — 36415 COLL VENOUS BLD VENIPUNCTURE: CPT | Performed by: EMERGENCY MEDICINE

## 2024-01-25 PROCEDURE — 99285 EMERGENCY DEPT VISIT HI MDM: CPT

## 2024-01-25 PROCEDURE — 80053 COMPREHEN METABOLIC PANEL: CPT | Performed by: EMERGENCY MEDICINE

## 2024-01-25 PROCEDURE — 85004 AUTOMATED DIFF WBC COUNT: CPT | Performed by: EMERGENCY MEDICINE

## 2024-01-25 RX ORDER — HYDROXYZINE HYDROCHLORIDE 25 MG/1
25 TABLET, FILM COATED ORAL 2 TIMES DAILY PRN
COMMUNITY

## 2024-01-25 RX ORDER — DIAZEPAM 10 MG/2ML
5-10 INJECTION, SOLUTION INTRAMUSCULAR; INTRAVENOUS EVERY 30 MIN PRN
Status: DISCONTINUED | OUTPATIENT
Start: 2024-01-25 | End: 2024-01-25 | Stop reason: HOSPADM

## 2024-01-25 RX ORDER — MULTIPLE VITAMINS W/ MINERALS TAB 9MG-400MCG
1 TAB ORAL DAILY
Status: DISCONTINUED | OUTPATIENT
Start: 2024-01-25 | End: 2024-01-25 | Stop reason: HOSPADM

## 2024-01-25 RX ORDER — DIAZEPAM 5 MG
10 TABLET ORAL EVERY 30 MIN PRN
Status: DISCONTINUED | OUTPATIENT
Start: 2024-01-25 | End: 2024-01-25 | Stop reason: HOSPADM

## 2024-01-25 RX ORDER — FOLIC ACID 1 MG/1
1 TABLET ORAL DAILY
Status: DISCONTINUED | OUTPATIENT
Start: 2024-01-25 | End: 2024-01-25 | Stop reason: HOSPADM

## 2024-01-25 RX ADMIN — Medication 1 TABLET: at 13:45

## 2024-01-25 RX ADMIN — SODIUM CHLORIDE 1000 ML: 9 INJECTION, SOLUTION INTRAVENOUS at 13:53

## 2024-01-25 RX ADMIN — FOLIC ACID 1 MG: 1 TABLET ORAL at 13:45

## 2024-01-25 RX ADMIN — THIAMINE HCL TAB 100 MG 100 MG: 100 TAB at 13:45

## 2024-01-25 ASSESSMENT — LIFESTYLE VARIABLES
ORIENTATION AND CLOUDING OF SENSORIUM: ORIENTED AND CAN DO SERIAL ADDITIONS
NAUSEA AND VOMITING: NO NAUSEA AND NO VOMITING
TREMOR: NO TREMOR
AUDITORY DISTURBANCES: NOT PRESENT
VISUAL DISTURBANCES: NOT PRESENT
ANXIETY: MODERATELY ANXIOUS, OR GUARDED, SO ANXIETY IS INFERRED
AGITATION: NORMAL ACTIVITY
HEADACHE, FULLNESS IN HEAD: VERY MILD
TOTAL SCORE: 5
PAROXYSMAL SWEATS: NO SWEAT VISIBLE

## 2024-01-25 ASSESSMENT — ACTIVITIES OF DAILY LIVING (ADL)
ADLS_ACUITY_SCORE: 35

## 2024-01-25 NOTE — ED NOTES
RN called by detox regarding pt.  Nurse report was given and pt can go to detox any time after 1700.  Pt is alert and oriented but still intoxicated.  No further questions at this time. Will continue with current plan of care.

## 2024-01-25 NOTE — DISCHARGE INSTRUCTIONS
Use your Librium as prescribed for alcohol withdrawal.  Please return the emergency department if you are concerned for your safety at any point in time.

## 2024-01-25 NOTE — ED NOTES
"MD was notified that pt is getting aggressive and is getting more and more upset.  MD states \"he can do whatever he wants\".  Pt was told this by the charge RN and pt states \"you will have to pay for it\", pt was informed that the hospital does not pay for a ride home.  Pt states he is not staying any longer.  Pt states \"well I think I have enough money\" he is currently calling [employer] to find a ride  "

## 2024-01-25 NOTE — ED NOTES
"Pt called 911 and came out with his phone in hand, pt states that the  would like to talk to the nurse.  Writer spoke to the officer and told him nothing was wrong at this time, pt just needs a sober ride and he can leave.  Pt states \"this is fucking bullshit, I should not have to stay here\".  Again he was told that he can leave as soon as a responsible person comes.  Pt is crying in the room.  Will continue with current plan of care.  "

## 2024-01-25 NOTE — ED NOTES
Pt was discharged to the ED lobby, refused VS.  Pt has his own phone and his belongings.  No further questions.

## 2024-01-25 NOTE — ED PROVIDER NOTES
"    History     Chief Complaint:  Alcohol Intoxication       HPI   Giovani Damian is a 43 year old male presenting with alcohol intoxication.  He arrives via EMS on an KARL.  The patient reports he wants help to stop drinking.  He is tearful, and multiple times states that he is \"a piece of shit\" and that his \"life sucks.\"  He denies suicidal ideation.  He endorses going to detox several times.  He has a history of withdrawal complicated by seizure.  He states his brother  of an alcohol withdrawal seizure, and he was his best friend.  This happened 3 years ago.  The patient was seen in the emergency department yesterday for alcohol intoxication, and was discharged with Librium.  He reports that Librium does not work for him. The patient told me multiple times that his last alcoholic drink was yesterday, but told nursing staff that his last drink was this morning.  No fever, chills, chest pain, shortness of breath, vomiting.  Per EMS, the patient took 6 shots of fireball within 30 minutes and the police state that the patient had difficulty standing due to his intoxication.      Independent Historian:    Patient only    Review of External Notes:  23: ED note reviewed.  Patient evaluated for alcohol withdrawal.  The patient ultimately was discharged with a prescription for Librium.    Medications:    buprenorphine HCl-naloxone HCl (SUBOXONE) 8-2 MG per film  chlordiazePOXIDE (LIBRIUM) 25 MG capsule  clonazePAM (KLONOPIN) 0.5 MG tablet  cloNIDine (CATAPRES) 0.1 MG tablet  hydrOXYzine HCl (ATARAX) 25 MG tablet  lisinopril (ZESTRIL) 10 MG tablet  mirtazapine (REMERON) 30 MG tablet        Past Medical History:    Past Medical History:   Diagnosis Date    Alcohol abuse     Anxiety     Depressive disorder     Hypertension     Seasonal allergic rhinitis        Past Surgical History:    Past Surgical History:   Procedure Laterality Date    MOUTH SURGERY            Physical Exam   Patient Vitals for the past 24 " hrs:   BP Temp Temp src Pulse Resp SpO2   01/25/24 1241 (!) 142/102 98.3  F (36.8  C) Oral 105 20 95 %        Physical Exam  General: Resting on the bed.  Appears intoxicated.  Tearful.  Head: No obvious trauma to head.  Ears, Nose, Throat:  External ears normal.  Nose normal.  No pharyngeal erythema, swelling or exudate.  Midline uvula. Moist mucus membranes.  Eyes:  Conjunctivae clear.   Neck: Normal range of motion.  Neck supple.   CV: Regular rate and rhythm.  No murmurs.      Respiratory: Effort normal and breath sounds normal.  No wheezing or crackles.   Gastrointestinal: Soft.  No distension. There is no tenderness.  There is no rigidity, no rebound and no guarding.   Musculoskeletal: Normal range of motion.  Non tender extremities to palpations.    Neuro: Alert. Moving all extremities appropriately.  Slurred speech.  Skin: Skin is warm and dry.  No rash noted.   Psych: Tearful.  Hopeless.  Denies SI and HI.      Emergency Department Course       Laboratory:  Labs Ordered and Resulted from Time of ED Arrival to Time of ED Departure   COMPREHENSIVE METABOLIC PANEL - Abnormal       Result Value    Sodium 141      Potassium 4.4      Carbon Dioxide (CO2) 23      Anion Gap 14      Urea Nitrogen 12.8      Creatinine 0.98      GFR Estimate >90      Calcium 8.5 (*)     Chloride 104      Glucose 120 (*)     Alkaline Phosphatase 132       (*)      (*)     Protein Total 7.1      Albumin 4.5      Bilirubin Total 0.5     ETHYL ALCOHOL LEVEL - Abnormal    Alcohol ethyl 0.36 (*)    MAGNESIUM - Normal    Magnesium 2.3     PHOSPHORUS - Normal    Phosphorus 3.0     CBC WITH PLATELETS AND DIFFERENTIAL    WBC Count 5.5      RBC Count 5.53      Hemoglobin 16.4      Hematocrit 47.8      MCV 86      MCH 29.7      MCHC 34.3      RDW 12.8      Platelet Count 376      % Neutrophils 51      % Lymphocytes 40      % Monocytes 6      % Eosinophils 1      % Basophils 2      % Immature Granulocytes 0      NRBCs per 100 WBC 0    "   Absolute Neutrophils 2.8      Absolute Lymphocytes 2.2      Absolute Monocytes 0.3      Absolute Eosinophils 0.1      Absolute Basophils 0.1      Absolute Immature Granulocytes 0.0      Absolute NRBCs 0.0          Procedures   NA    Emergency Department Course & Assessments:             Interventions:  Medications   diazepam (VALIUM) tablet 10 mg (has no administration in time range)     Or   diazepam (VALIUM) injection 5-10 mg (has no administration in time range)   thiamine (B-1) tablet 100 mg (100 mg Oral $Given 1/25/24 1345)   folic acid (FOLVITE) tablet 1 mg (1 mg Oral $Given 1/25/24 1345)   multivitamin w/minerals (THERA-VIT-M) tablet 1 tablet (1 tablet Oral $Given 1/25/24 1345)   sodium chloride 0.9% BOLUS 1,000 mL (1,000 mLs Intravenous $New Bag 1/25/24 1353)        Assessments:  1243 -initial evaluation and assessment of patient  1521 -I reevaluated the patient    Independent Interpretation (X-rays, CTs, rhythm strip):  None    Consultations/Discussion of Management or Tests:  None       Social Determinants of Health affecting care:  None     Disposition:  The patient was discharged to home.     Impression & Plan    CMS Diagnoses: None        Medical Decision Making:  Patient presents with alcohol intoxication.  He states he wants help getting sober.  He denies SI, but multiple times endorses hopelessness, and no point in living because he is \"a piece of shit.\"  I am concerned for his safety.  He arrives on an KARL.  Ethanol level is 0.36.  He appears clinically intoxicated.  Liver enzymes are elevated.  He is placed on the CIWA protocol.  1 L of normal saline is given, as well as oral folic acid, thiamine, multivitamin.  He is currently not triggering the alcohol withdrawal scale to necessitate Valium.  He was monitored in the emergency department for almost 3 hours.  He is clinically sober.  Nursing staff called to report to [facility], and they state they will be able to take the patient later today. "  As the patient becomes clinically sober, he is now refusing detox.  He states he wants to go home.  He reports he has the Librium prescription, and he will be fine.  He states he feels safe, and denies SI and HI.  He is no longer tearful.  He reports he needs to go to work tomorrow.  He asks for a prescription for Valium.  I explained to him that I would not be given a prescription for Valium, since he has a prescription for Librium already, for which she stated he will use.  He states he understands this.  I again offered him detox, but again he refuses.  There is no indication to hold him against as well.  He is medically cleared to discharge home.  He remains calm and cooperative and is discharged home in stable condition.        Diagnosis:    ICD-10-CM    1. Alcoholic intoxication without complication (H24)  F10.920            Discharge Medications:  New Prescriptions    No medications on file          1/25/2024   Kristian Berg MD Peery, Stephen, MD  01/25/24 0741

## 2024-01-25 NOTE — PHARMACY-ADMISSION MEDICATION HISTORY
Pharmacist Admission Medication History    Admission medication history is complete. The information provided in this note is only as accurate as the sources available at the time of the update.    Information Source(s): Patient via in-person    Pertinent Information: None    Changes made to PTA medication list:  Added: hydroxyzine  Deleted: None  Changed: clonazepam 0.5 daily prn --> bid prn, lisinopril 5 mg daily --> !0 mg daily    Medication Affordability:       Allergies reviewed with patient and updates made in EHR: yes    Medication History Completed By: Ruthann Wilson RPH 1/25/2024 2:34 PM    PTA Med List   Medication Sig Last Dose    buprenorphine HCl-naloxone HCl (SUBOXONE) 8-2 MG per film Place 0.5 Film under the tongue 4 times daily as needed (opioid withdrawal) 1/24/2024 at x2    chlordiazePOXIDE (LIBRIUM) 25 MG capsule Take 1 capsule (25 mg) by mouth 4 times daily as needed for withdrawal prn at prn    clonazePAM (KLONOPIN) 0.5 MG tablet Take 1 mg by mouth 2 times daily as needed for anxiety prn at prn    cloNIDine (CATAPRES) 0.1 MG tablet Take 0.1 mg by mouth 2 times daily 1/24/2024 at x2    hydrOXYzine HCl (ATARAX) 25 MG tablet Take 25 mg by mouth 2 times daily as needed for anxiety prn at prn    lisinopril (ZESTRIL) 10 MG tablet Take 10 mg by mouth daily 1/24/2024 at am    mirtazapine (REMERON) 30 MG tablet Take 30 mg by mouth At Bedtime 1/24/2024 at hs

## 2024-01-25 NOTE — ED NOTES
"Pt is coming to the door often with many requests.  Pt was informed that he is able to go home as long as he has a sober ride come and get him.  Pt states he is getting an [employer], pt is still visibly intoxicated and was again informed that he is allowed to leave, he just needs a responsible person to take him home and Uber does not count as he is not sober yet.  Pt states \"this is bull shit, I am 43 year old and you are making me call someone to take me home\", pt was again educated that he is allowed to leave, he is not on a hold as far as being stuck here but does need a responsible person to take him.  As soon as he has a ride he can go, discharge paperwork has been printed, awaiting a responsible and sober . Will continue with current plan of care.    " Spoke to patient. He will stop in later today to sign release of info for WE form. Form placed in blue folder at .

## 2024-01-25 NOTE — ED TRIAGE NOTES
"Pt comes in on an KARL from [facility]. Pt called EMS for himself  \"because I want to be better and stop drinking.\"    Pt took 6 shots of fireball within 30 minutes. Per police on scene, Pt had a difficulty time standing, did not know time or president. Safety concerns due to Pt going home alone.       "

## 2024-01-27 ENCOUNTER — HOSPITAL ENCOUNTER (EMERGENCY)
Facility: CLINIC | Age: 44
Discharge: HOME | End: 2024-01-28
Attending: EMERGENCY MEDICINE | Admitting: EMERGENCY MEDICINE
Payer: COMMERCIAL

## 2024-01-27 DIAGNOSIS — F10.920 ALCOHOLIC INTOXICATION WITHOUT COMPLICATION (H): ICD-10-CM

## 2024-01-27 DIAGNOSIS — R45.851 SUICIDAL IDEATION: ICD-10-CM

## 2024-01-27 LAB
ANION GAP SERPL CALCULATED.3IONS-SCNC: 17 MMOL/L (ref 7–15)
BASOPHILS # BLD AUTO: 0.1 10E3/UL (ref 0–0.2)
BASOPHILS NFR BLD AUTO: 1 %
BUN SERPL-MCNC: 18.1 MG/DL (ref 6–20)
CALCIUM SERPL-MCNC: 8.2 MG/DL (ref 8.6–10)
CHLORIDE SERPL-SCNC: 104 MMOL/L (ref 98–107)
CREAT SERPL-MCNC: 1.01 MG/DL (ref 0.67–1.17)
DEPRECATED HCO3 PLAS-SCNC: 22 MMOL/L (ref 22–29)
EGFRCR SERPLBLD CKD-EPI 2021: >90 ML/MIN/1.73M2
EOSINOPHIL # BLD AUTO: 0 10E3/UL (ref 0–0.7)
EOSINOPHIL NFR BLD AUTO: 0 %
ERYTHROCYTE [DISTWIDTH] IN BLOOD BY AUTOMATED COUNT: 12.8 % (ref 10–15)
ETHANOL SERPL-MCNC: 0.37 G/DL
GLUCOSE SERPL-MCNC: 138 MG/DL (ref 70–99)
HCT VFR BLD AUTO: 47.6 % (ref 40–53)
HGB BLD-MCNC: 16.2 G/DL (ref 13.3–17.7)
IMM GRANULOCYTES # BLD: 0 10E3/UL
IMM GRANULOCYTES NFR BLD: 0 %
LYMPHOCYTES # BLD AUTO: 2.6 10E3/UL (ref 0.8–5.3)
LYMPHOCYTES NFR BLD AUTO: 34 %
MCH RBC QN AUTO: 29.8 PG (ref 26.5–33)
MCHC RBC AUTO-ENTMCNC: 34 G/DL (ref 31.5–36.5)
MCV RBC AUTO: 88 FL (ref 78–100)
MONOCYTES # BLD AUTO: 0.4 10E3/UL (ref 0–1.3)
MONOCYTES NFR BLD AUTO: 6 %
NEUTROPHILS # BLD AUTO: 4.6 10E3/UL (ref 1.6–8.3)
NEUTROPHILS NFR BLD AUTO: 59 %
NRBC # BLD AUTO: 0 10E3/UL
NRBC BLD AUTO-RTO: 0 /100
PLATELET # BLD AUTO: 332 10E3/UL (ref 150–450)
POTASSIUM SERPL-SCNC: 4.1 MMOL/L (ref 3.4–5.3)
RBC # BLD AUTO: 5.44 10E6/UL (ref 4.4–5.9)
SODIUM SERPL-SCNC: 143 MMOL/L (ref 135–145)
WBC # BLD AUTO: 7.7 10E3/UL (ref 4–11)

## 2024-01-27 PROCEDURE — 82077 ASSAY SPEC XCP UR&BREATH IA: CPT | Performed by: EMERGENCY MEDICINE

## 2024-01-27 PROCEDURE — 85025 COMPLETE CBC W/AUTO DIFF WBC: CPT | Performed by: EMERGENCY MEDICINE

## 2024-01-27 PROCEDURE — 80048 BASIC METABOLIC PNL TOTAL CA: CPT | Performed by: EMERGENCY MEDICINE

## 2024-01-27 PROCEDURE — 99285 EMERGENCY DEPT VISIT HI MDM: CPT

## 2024-01-27 PROCEDURE — 250N000013 HC RX MED GY IP 250 OP 250 PS 637: Performed by: EMERGENCY MEDICINE

## 2024-01-27 PROCEDURE — 36415 COLL VENOUS BLD VENIPUNCTURE: CPT | Performed by: EMERGENCY MEDICINE

## 2024-01-27 RX ORDER — CHLORDIAZEPOXIDE HYDROCHLORIDE 25 MG/1
25 CAPSULE, GELATIN COATED ORAL EVERY 6 HOURS PRN
Status: COMPLETED | OUTPATIENT
Start: 2024-01-27 | End: 2024-01-27

## 2024-01-27 RX ORDER — DIAZEPAM 5 MG
5 TABLET ORAL ONCE
Status: COMPLETED | OUTPATIENT
Start: 2024-01-27 | End: 2024-01-27

## 2024-01-27 RX ORDER — POLYETHYLENE GLYCOL 3350 17 G
2 POWDER IN PACKET (EA) ORAL
Status: DISCONTINUED | OUTPATIENT
Start: 2024-01-27 | End: 2024-01-28 | Stop reason: HOSPADM

## 2024-01-27 RX ADMIN — CHLORDIAZEPOXIDE HYDROCHLORIDE 25 MG: 25 CAPSULE ORAL at 23:40

## 2024-01-27 RX ADMIN — DIAZEPAM 5 MG: 5 TABLET ORAL at 20:01

## 2024-01-27 ASSESSMENT — LIFESTYLE VARIABLES
ANXIETY: NO ANXIETY, AT EASE
TREMOR: NO TREMOR
VISUAL DISTURBANCES: NOT PRESENT
TOTAL SCORE: 1
HEADACHE, FULLNESS IN HEAD: NOT PRESENT
AUDITORY DISTURBANCES: NOT PRESENT
NAUSEA AND VOMITING: NO NAUSEA AND NO VOMITING
ORIENTATION AND CLOUDING OF SENSORIUM: CANNOT DO SERIAL ADDITIONS OR IS UNCERTAIN ABOUT DATE
AGITATION: NORMAL ACTIVITY
PAROXYSMAL SWEATS: NO SWEAT VISIBLE

## 2024-01-27 ASSESSMENT — ACTIVITIES OF DAILY LIVING (ADL)
ADLS_ACUITY_SCORE: 35

## 2024-01-28 VITALS
DIASTOLIC BLOOD PRESSURE: 92 MMHG | RESPIRATION RATE: 20 BRPM | OXYGEN SATURATION: 99 % | TEMPERATURE: 98 F | SYSTOLIC BLOOD PRESSURE: 169 MMHG | HEART RATE: 102 BPM

## 2024-01-28 PROBLEM — F41.9 ANXIETY: Status: ACTIVE | Noted: 2024-01-28

## 2024-01-28 PROCEDURE — 250N000013 HC RX MED GY IP 250 OP 250 PS 637: Performed by: EMERGENCY MEDICINE

## 2024-01-28 RX ORDER — LORAZEPAM 1 MG/1
1 TABLET ORAL ONCE
Status: COMPLETED | OUTPATIENT
Start: 2024-01-28 | End: 2024-01-28

## 2024-01-28 RX ORDER — CHLORDIAZEPOXIDE HYDROCHLORIDE 25 MG/1
25 CAPSULE, GELATIN COATED ORAL 3 TIMES DAILY PRN
Qty: 15 CAPSULE | Refills: 0 | Status: SHIPPED | OUTPATIENT
Start: 2024-01-28 | End: 2024-03-07

## 2024-01-28 RX ADMIN — NICOTINE POLACRILEX 2 MG: 2 LOZENGE ORAL at 00:27

## 2024-01-28 RX ADMIN — NICOTINE POLACRILEX 2 MG: 2 LOZENGE ORAL at 01:41

## 2024-01-28 RX ADMIN — LORAZEPAM 1 MG: 1 TABLET ORAL at 01:26

## 2024-01-28 ASSESSMENT — ACTIVITIES OF DAILY LIVING (ADL)
ADLS_ACUITY_SCORE: 35
ADLS_ACUITY_SCORE: 35

## 2024-01-28 NOTE — ED NOTES
Pt continues to need education from RN that pt is on a hold and has to wait for DEC (mental health specialist) to speak with pt before possibility of going home. RN reiterated to pt that due to comments that pt shared with friends and what friends report to previous RN, pt will need a DEC assessment.

## 2024-01-28 NOTE — ED NOTES
Patient continually getting up ever 10-15 min and asking if he can leave and if he can have his jacket. Education continued to be provided to patient with little evidence of understanding. Pt on phone with friends asking them to come and pick him up. I again educated patient that he is not allowed to leave until seen by DEC  once clinically sober. I noted that patient had urinated himself and on bed. I offered new pants, sheets and wipes, all of which he declined. Pt. Given water and went back to bed.

## 2024-01-28 NOTE — ED NOTES
Patient up in room. Asked what he needs and he says he wants to go home. Reiterated the plan for patient to sober up and then speak with DEC. Patient asked for jacket, I stated he cannot have his jacket at this time, but he can have his phone.

## 2024-01-28 NOTE — CONSULTS
Diagnostic Evaluation Consultation  Crisis Assessment    Patient Name: Giovani Damian  Age:  43 year old  Legal Sex: male  Gender Identity: male  Pronouns:   Race: White  Ethnicity: Not  or   Language: English    Patient was assessed: Virtual: [employer]   Crisis Assessment Start Time: 0318 Crisis Assessment Stop Time: 0400  Patient location: [facility] EMERGENCY DEPT                               Referral Data and Chief Complaint  Giovani Damian presents to the ED with family/friends. Patient is presenting to the ED for the following concerns: Intoxication, Suicidal ideation.   Factors that make the mental health crisis life threatening or complex are:  Pt presents to ED intoxicated at a 0.37 at 19:00 and it is reported he had made a comment about being suicidal to a friend. Pt denies this and states he is not suicidal and would like to go home to his family. Pt has twin girls 8 yrs old and a 7 yr old boy he cares for every other weekend. Pt plans to return to AA and will get back to his sobriety. Pt states he has had 5 motnhs of sobriety recently and feels he can be sober with AA attendance. Pt declined any other services at this time.    Informed Consent and Assessment Methods  Explained the crisis assessment process, including applicable information disclosures and limits to confidentiality, assessed understanding of the process, and obtained consent to proceed with the assessment.  Assessment methods included conducting a formal interview with patient, review of medical records, collaboration with medical staff, and obtaining relevant collateral information from family and community providers when available.  : done     Patient response to interventions: eager to participate, verbalizes understanding  Coping skills were attempted to reduce the crisis:  AA meetings     History of the Crisis   Pt reports dx for anxiety and feels he was first diagnosed in 2015. Pt takes a medication as  "needed for his anxiety. Pt reports attending ANN programming 2-3 times in the past. Most recently at [facility] one month ago and had attended the program for 4 months. Pt reports successfully completing the program. Pt reports he attends AA 6 days per week.    Brief Psychosocial History  Family:  Single, Children yes (Twin girls 8 yr old and 7 yr old boy)  Support System:  Parent(s), Other (specify) (People at work care about me, friend Kevin, a lot of supports. Friend Amanda attends AA with pt.)  Employment Status:  employed full-time (Pt is a restoration contractor, [employer].)  Source of Income:  salary/wages  Financial Environmental Concerns:  none  Current Hobbies:  television/movies/videos, social media/computer activities, interaction with pets, music  Barriers in Personal Life:   (None endorsed)    Significant Clinical History  Current Anxiety Symptoms:   (Pt denied any symptoms)  Current Depression/Trauma:  thoughts of death/suicide  Current Somatic Symptoms:   (None endorsed)  Current Psychosis/Thought Disturbance:  impulsive  Current Eating Symptoms:   (None endorsed)  Chemical Use History:  Alcohol: Daily  Last Use:24  Benzodiazepines: None  Opiates: None  Cocaine: None  Marijuana: Occasional  Last Use: 24  Other Use: Other (comments) (kratom)  Last Use: 24  Withdrawal Symptoms: Nausea (\"not feeling good\")  Addictions:  (None endorsed)   Past diagnosis:  Anxiety Disorder, Substance Use Disorder  Family history:  Substance Use Disorder, Depression, Anxiety Disorder (Brother  from alcohol withdrawal 3 years ago. Sister and mother = alcoholism.)  Past treatment:  AA/NA, Other, Psychiatric Medication Management, Probation/Court ordered treatment, Day Treatment, Individual therapy (ANN programming)  Details of most recent treatment:  Pt reports dx for anxiety and feels he was first diagnosed in . Pt takes a medication as needed for his anxiety. Pt reports attending ANN " programming 2-3 times in the past. Most recently at [facility] one month ago and had attended the program for 4 months. Pt reports successfully completing the program. Pt reports he attends AA 6 days per week. Pt reports being on probation with [facility] until sometime this year. Is court ordered for community service and DBT treatment. Pt reports when he lived in [place] he attended therapy with his dad, who got too involved and so he stopped going. Pt states he has a therapist and psychiatrist now. Pt unable to provide specifics at this time, he feels it might be through [facility].  Other relevant history:  DWI Memorial Day 2022.     Collateral Information  Is there collateral information: No (Unable to reach friend at time of assessment.)      Risk Assessment  Fairchild Suicide Severity Rating Scale Full Clinical Version:  Suicidal Ideation  Q1 Wish to be Dead (Lifetime): No  Q2 Non-Specific Active Suicidal Thoughts (Lifetime): No  Q6 Suicide Behavior (Lifetime): no     Suicidal Behavior (Lifetime)  Actual Attempt (Lifetime): No  Has subject engaged in non-suicidal self-injurious behavior? (Lifetime): No  Interrupted Attempts (Lifetime): No  Aborted or Self-Interrupted Attempt (Lifetime): No  Preparatory Acts or Behavior (Lifetime): No    Fairchild Suicide Severity Rating Scale Recent:   Suicidal Ideation (Recent)  Q1 Wished to be Dead (Past Month): no  Q2 Suicidal Thoughts (Past Month): no  Intensity of Ideation (Recent)  Most Severe Ideation Rating (Past 1 Month):  (None endorsed by the pt)  Frequency (Past 1 Month):  (None endorsed by the pt)  Duration (Past 1 Month):  (None endorsed by the pt)  Controllability (Past 1 Month):  (None endorsed by the pt)  Deterrents (Past 1 Month):  (None endorsed by the pt)  Reasons for Ideation (Past 1 Month):  (None endorsed by the pt)  Suicidal Behavior (Recent)  Actual Attempt (Past 3 Months): No  Total Number of Actual Attempts (Past 3 Months): 0  Has  subject engaged in non-suicidal self-injurious behavior? (Past 3 Months): No  Interrupted Attempts (Past 3 Months): No  Total Number of Interrupted Attempts (Past 3 Months): 0  Aborted or Self-Interrupted Attempt (Past 3 Months): No  Total Number of Aborted or Self-Interrupted Attempts (Past 3 Months): 0  Preparatory Acts or Behavior (Past 3 Months): No  Total Number of Preparatory Acts (Past 3 Months): 0    Environmental or Psychosocial Events: ongoing abuse of substances (Hx of DWI in 2022)  Protective Factors: Protective Factors: strong bond to family unit, community support, or employment, sense of belonging, responsibilities and duties to others, including pets and children, lives in a responsibly safe and stable environment, good treatment engagement, sense of importance of health and wellness, good impulse control, constructive use of leisure time, enjoyable activities, resilience    Does the patient have thoughts of harming others? Feels Like Hurting Others: no  Previous Attempt to Hurt Others: no  Current presentation:  (None endorsed or observed.)  Is the patient engaging in sexually inappropriate behavior?: no    Is the patient engaging in sexually inappropriate behavior?  no        Mental Status Exam   Affect: Appropriate  Appearance: Appropriate  Attention Span/Concentration: Attentive  Eye Contact: Engaged    Fund of Knowledge: Appropriate   Language /Speech Content: Fluent  Language /Speech Volume: Normal  Language /Speech Rate/Productions: Normal  Recent Memory: Intact  Remote Memory: Intact  Mood: Normal  Orientation to Person: Yes   Orientation to Place: Yes  Orientation to Time of Day: Yes  Orientation to Date: Yes     Situation (Do they understand why they are here?): Yes  Psychomotor Behavior: Normal  Thought Content: Clear  Thought Form: Goal Directed      Medication  Psychotropic medications:   Medication Orders - Psychiatric (From admission, onward)      Start     Dose/Rate Route Frequency  Ordered Stop    01/28/24 0000  chlordiazePOXIDE (LIBRIUM) 25 MG capsule         25 mg Oral 3 TIMES DAILY PRN 01/28/24 0408          Current Care Team  Patient Care Team:  Magalis, Emmie Handy as PCP - General    Diagnosis  Patient Active Problem List   Diagnosis Code    Panic disorder F41.0    Pain of right hand M79.641    Alcohol withdrawal syndrome without complication (H) F10.930    Anxiety F41.9     Primary Problem This Admission  Active Hospital Problems  F41.9  Anxiety    F10.239  Alcohol withdrawal syndrome without complication (H)    Clinical Summary and Substantiation of Recommendations   After therapeutic assessment, intervention, and aftercare planning by ED care team and LM and in consultation with attending provider, the patient's circumstances and mental state were appropriate for outpatient management. It is the recommendation of this clinician that pt discharge with OP MH support. Currently the pt is not presenting as an acute risk to self or others due to the following factors: Pt denied ever making a suicidal comment. Pt denied any SI/HI/NSSI. Pt was calm and cooperative for the assessment. Pt developed a safety plan and reports feeling safe for discharge at this time. Pt plans to resume AA and declined any other services offered.    Patient coping skills attempted to reduce the crisis:  AA meetings    Disposition  Recommended disposition: Individual Therapy, Medication Management        Reviewed case and recommendations with attending provider. Attending Name: Dr Espinosa       Attending concurs with disposition: yes       Patient and/or validated legal guardian concurs with disposition: yes       Final disposition:  discharge    Legal status on admission: Voluntary/Patient has signed consent for treatment    Assessment Details   Total duration spent with the patient: 42 min     CPT code(s) utilized: 41874 - Psychotherapy for Crisis - 60 (30-74*) min    Lucie Gilman [facility],  Psychotherapist  DEC - Triage & Transition Services  Callback: [phone]

## 2024-01-28 NOTE — ED TRIAGE NOTES
Patient intoxicated. Visitor stating patient stated that he wants to commit suicide. Patient stating he just wants to go home. Patient denies statements. Last drink about 1800

## 2024-01-28 NOTE — ED NOTES
Patient noted on security camera to be resting on bed, eyes closed. Respirations even and unlabored.

## 2024-01-28 NOTE — ED NOTES
Patient coherent and logical, states that he would like to leave, Dr. Espinosa came and talked to the patient, awaiting DEC eval.

## 2024-01-28 NOTE — ED PROVIDER NOTES
I received patient in signout from Dr. Keller.  Please refer to their complete H&P for further information.  Briefly, patient is a 44 yo male presenting with alcohol intoxication and concern for expressing suicidal ideations to a friend. At time of signout, clinical sobriety and DEC eval pending.     Patient began to develop mild tremors during his time in the ED; given librium and dose of PO ativan with significant improvements in symptoms.  He continues to request discharge though is agreeable to DEC evaluation.      4:08 AM   Patient clinically sober, evaluated by DEC.  He denies suicidal ideations or response to internal stimuli. No indication for emergent psychiatric hospitalization at this time. He contracts for safety. Recommendation for  resources outpatient.  Patient offered detox though declining. Concern for early ETOH withdrawal as well so patient to be given rx for librium. Return precautions given.      Nat Espinosa, DO  01/28/24 0410

## 2024-01-28 NOTE — ED PROVIDER NOTES
History     Chief Complaint:  Alcohol Intoxication       The history is provided by a friend.      Giovani Damian is a 43 year old male with a history of alcohol abuse, tobacco use, panic disorder, depression, anxiety, and hypertension who presents with alcohol intoxication. Patient's friend reports patient has been drinking for several days, and was found with 10 empty mini bottles of [employer] liquor. He was seen in the ED twice this week for alcohol intoxication, but today has expressed suicidal ideations several times to friend at bedside and another friend, Kristin. He reports patient was in treatment 1.5 years ago, but he has never seen him this bad. Patient himself states he wants to go home. Denies suicidal statements.    Independent Historian:   Friend at bedside, as per HPI.    Review of External Notes:   none     Medications:    Buprenorphine   Chlordiazepoxide   Clonazepam  Clonidine   Hydrocodone acetaminophen   Hydroxyzine   Lisinopril   Mirtazapine     Past Medical History:    Alcohol abuse   Hypertension  Major depressive disorder   Rib fracture   Alcohol withdrawal   Panic disorder   Anxiety   Tobacco use     Past Surgical History:    Wrist fracture repair   Jaw fracture repair   Rib fracture repair     Physical Exam   Patient Vitals for the past 24 hrs:   BP Temp Temp src Pulse Resp SpO2   01/27/24 1826 (!) 149/126 97.8  F (36.6  C) Temporal (!) 134 20 98 %      Physical Exam  Constitutional:       Appearance: He is well-developed.   HENT:      Right Ear: External ear normal.      Left Ear: External ear normal.      Mouth/Throat:      Mouth: Mucous membranes are dry.      Pharynx: Oropharynx is clear. No oropharyngeal exudate or posterior oropharyngeal erythema.   Eyes:      General: No scleral icterus.     Conjunctiva/sclera: Conjunctivae normal.      Pupils: Pupils are equal, round, and reactive to light.   Cardiovascular:      Rate and Rhythm: Regular rhythm. Tachycardia present.      Heart  sounds: Normal heart sounds. No murmur heard.     No friction rub. No gallop.   Pulmonary:      Effort: Pulmonary effort is normal. No respiratory distress.      Breath sounds: Normal breath sounds. No wheezing or rales.   Abdominal:      General: Bowel sounds are normal. There is no distension.      Palpations: Abdomen is soft. There is no mass.      Tenderness: There is no abdominal tenderness.   Skin:     General: Skin is warm and dry.      Capillary Refill: Capillary refill takes less than 2 seconds.      Findings: No rash.   Neurological:      General: No focal deficit present.      Mental Status: He is alert.      Comments: Intoxicated appearing. Slurred speech at times   Psychiatric:      Comments: Denies SI           Emergency Department Course   Laboratory:  Labs Ordered and Resulted from Time of ED Arrival to Time of ED Departure   BASIC METABOLIC PANEL - Abnormal       Result Value    Sodium 143      Potassium 4.1      Chloride 104      Carbon Dioxide (CO2) 22      Anion Gap 17 (*)     Urea Nitrogen 18.1      Creatinine 1.01      GFR Estimate >90      Calcium 8.2 (*)     Glucose 138 (*)    ETHYL ALCOHOL LEVEL - Abnormal    Alcohol ethyl 0.37 (*)    CBC WITH PLATELETS AND DIFFERENTIAL    WBC Count 7.7      RBC Count 5.44      Hemoglobin 16.2      Hematocrit 47.6      MCV 88      MCH 29.8      MCHC 34.0      RDW 12.8      Platelet Count 332      % Neutrophils 59      % Lymphocytes 34      % Monocytes 6      % Eosinophils 0      % Basophils 1      % Immature Granulocytes 0      NRBCs per 100 WBC 0      Absolute Neutrophils 4.6      Absolute Lymphocytes 2.6      Absolute Monocytes 0.4      Absolute Eosinophils 0.0      Absolute Basophils 0.1      Absolute Immature Granulocytes 0.0      Absolute NRBCs 0.0          Emergency Department Course & Assessments:  Interventions:  Medications   diazepam (VALIUM) tablet 5 mg (5 mg Oral $Given 1/27/24 2001)        Assessments/Consultations/Discussion of Management or  Tests:   ED Course as of 01/27/24 1854   Sat Jan 27, 2024   1842 I evaluated the patient and obtained history as noted above.     Social Determinants of Health affecting care:   None    Disposition:  Care of the patient was transferred to my colleague Dr. Espinosa pending sobriety and DEC evaluation.     Impression & Plan    Medical Decision Making:  Patient presents today for evaluation of suicidal ideation in the context of alcohol intoxication.  Patient was seen here on the 24th and the 25th for alcohol intoxication.  Patient has continued to consume large amounts of alcohol.  His friend who is here stated that he brought him in due to his voicing of suicidal intent.  Patient on my exam denies it but given friend's concern, he is placed on KARL pending sobriety.  He cannot be evaluated by DEC until he is sober enough for evaluation.  Patient has been cooperative.  Patient care transferred Dr. Espinosa    Diagnosis:    ICD-10-CM    1. Alcoholic intoxication without complication (H24)  F10.920              Scribe Disclosure:  I, Stephanie Barrera, am serving as a scribe at 6:39 PM on 1/27/2024 to document services personally performed by Santana Keller MD based on my observations and the provider's statements to me.    1/27/2024   Santana Keller MD Cheng, Wenlan, MD  01/27/24 3744

## 2024-01-28 NOTE — ED NOTES
Patient frustrated that the DEC assessment is taking so long, explained that they will call when they are able, patient continues to deny that he is suicidal.  RN explained the plan again to patient.

## 2024-01-28 NOTE — ED NOTES
Report received, assumed care of the patient, patient resting in bed but doesn't come to the door every few minutes and asks to be re-breathalized so that he can go home.    Explained that he will likely be here until morning, not sure if he understands.

## 2024-01-28 NOTE — ED NOTES
Friend, Kevin, who accompanied pt into ED, now gone. Left his number and can be reached [phone].

## 2024-01-29 ENCOUNTER — HOSPITAL ENCOUNTER (EMERGENCY)
Facility: CLINIC | Age: 44
Discharge: OTHER INSTITUTION | End: 2024-01-29
Attending: EMERGENCY MEDICINE | Admitting: EMERGENCY MEDICINE
Payer: COMMERCIAL

## 2024-01-29 VITALS
TEMPERATURE: 98.3 F | SYSTOLIC BLOOD PRESSURE: 146 MMHG | RESPIRATION RATE: 14 BRPM | BODY MASS INDEX: 27.49 KG/M2 | HEART RATE: 111 BPM | DIASTOLIC BLOOD PRESSURE: 122 MMHG | OXYGEN SATURATION: 95 % | HEIGHT: 65 IN | WEIGHT: 165 LBS

## 2024-01-29 DIAGNOSIS — F10.930 ALCOHOL WITHDRAWAL SYNDROME WITHOUT COMPLICATION (H): ICD-10-CM

## 2024-01-29 DIAGNOSIS — F10.10 ALCOHOL ABUSE: ICD-10-CM

## 2024-01-29 LAB
ALBUMIN SERPL BCG-MCNC: 4.1 G/DL (ref 3.5–5.2)
ALP SERPL-CCNC: 128 U/L (ref 40–150)
ALT SERPL W P-5'-P-CCNC: 124 U/L (ref 0–70)
ANION GAP SERPL CALCULATED.3IONS-SCNC: 16 MMOL/L (ref 7–15)
AST SERPL W P-5'-P-CCNC: 77 U/L (ref 0–45)
BASOPHILS # BLD AUTO: 0.1 10E3/UL (ref 0–0.2)
BASOPHILS NFR BLD AUTO: 1 %
BILIRUB SERPL-MCNC: 0.8 MG/DL
BUN SERPL-MCNC: 21.3 MG/DL (ref 6–20)
CALCIUM SERPL-MCNC: 8.1 MG/DL (ref 8.6–10)
CHLORIDE SERPL-SCNC: 98 MMOL/L (ref 98–107)
CREAT SERPL-MCNC: 1.09 MG/DL (ref 0.67–1.17)
DEPRECATED HCO3 PLAS-SCNC: 22 MMOL/L (ref 22–29)
EGFRCR SERPLBLD CKD-EPI 2021: 86 ML/MIN/1.73M2
EOSINOPHIL # BLD AUTO: 0.1 10E3/UL (ref 0–0.7)
EOSINOPHIL NFR BLD AUTO: 1 %
ERYTHROCYTE [DISTWIDTH] IN BLOOD BY AUTOMATED COUNT: 12.7 % (ref 10–15)
ETHANOL SERPL-MCNC: 0.16 G/DL
GLUCOSE SERPL-MCNC: 113 MG/DL (ref 70–99)
HCT VFR BLD AUTO: 42.7 % (ref 40–53)
HGB BLD-MCNC: 14.8 G/DL (ref 13.3–17.7)
IMM GRANULOCYTES # BLD: 0 10E3/UL
IMM GRANULOCYTES NFR BLD: 0 %
LIPASE SERPL-CCNC: 113 U/L (ref 13–60)
LYMPHOCYTES # BLD AUTO: 3.3 10E3/UL (ref 0.8–5.3)
LYMPHOCYTES NFR BLD AUTO: 44 %
MCH RBC QN AUTO: 29.8 PG (ref 26.5–33)
MCHC RBC AUTO-ENTMCNC: 34.7 G/DL (ref 31.5–36.5)
MCV RBC AUTO: 86 FL (ref 78–100)
MONOCYTES # BLD AUTO: 0.4 10E3/UL (ref 0–1.3)
MONOCYTES NFR BLD AUTO: 5 %
NEUTROPHILS # BLD AUTO: 3.6 10E3/UL (ref 1.6–8.3)
NEUTROPHILS NFR BLD AUTO: 49 %
NRBC # BLD AUTO: 0 10E3/UL
NRBC BLD AUTO-RTO: 0 /100
PLATELET # BLD AUTO: 303 10E3/UL (ref 150–450)
POTASSIUM SERPL-SCNC: 4.3 MMOL/L (ref 3.4–5.3)
PROT SERPL-MCNC: 6.3 G/DL (ref 6.4–8.3)
RBC # BLD AUTO: 4.97 10E6/UL (ref 4.4–5.9)
SODIUM SERPL-SCNC: 136 MMOL/L (ref 135–145)
WBC # BLD AUTO: 7.5 10E3/UL (ref 4–11)

## 2024-01-29 PROCEDURE — 80053 COMPREHEN METABOLIC PANEL: CPT | Performed by: EMERGENCY MEDICINE

## 2024-01-29 PROCEDURE — 250N000013 HC RX MED GY IP 250 OP 250 PS 637: Performed by: EMERGENCY MEDICINE

## 2024-01-29 PROCEDURE — 258N000003 HC RX IP 258 OP 636: Performed by: EMERGENCY MEDICINE

## 2024-01-29 PROCEDURE — 85025 COMPLETE CBC W/AUTO DIFF WBC: CPT | Performed by: EMERGENCY MEDICINE

## 2024-01-29 PROCEDURE — 96361 HYDRATE IV INFUSION ADD-ON: CPT

## 2024-01-29 PROCEDURE — 83690 ASSAY OF LIPASE: CPT | Performed by: EMERGENCY MEDICINE

## 2024-01-29 PROCEDURE — 36415 COLL VENOUS BLD VENIPUNCTURE: CPT | Performed by: EMERGENCY MEDICINE

## 2024-01-29 PROCEDURE — 99284 EMERGENCY DEPT VISIT MOD MDM: CPT | Mod: 25

## 2024-01-29 PROCEDURE — 250N000011 HC RX IP 250 OP 636: Performed by: EMERGENCY MEDICINE

## 2024-01-29 PROCEDURE — 96374 THER/PROPH/DIAG INJ IV PUSH: CPT

## 2024-01-29 PROCEDURE — 82077 ASSAY SPEC XCP UR&BREATH IA: CPT | Performed by: EMERGENCY MEDICINE

## 2024-01-29 RX ORDER — DIAZEPAM 5 MG
10 TABLET ORAL ONCE
Status: COMPLETED | OUTPATIENT
Start: 2024-01-29 | End: 2024-01-29

## 2024-01-29 RX ORDER — ONDANSETRON 2 MG/ML
4 INJECTION INTRAMUSCULAR; INTRAVENOUS EVERY 30 MIN PRN
Status: DISCONTINUED | OUTPATIENT
Start: 2024-01-29 | End: 2024-01-29 | Stop reason: HOSPADM

## 2024-01-29 RX ADMIN — SODIUM CHLORIDE 1000 ML: 9 INJECTION, SOLUTION INTRAVENOUS at 03:54

## 2024-01-29 RX ADMIN — ONDANSETRON 4 MG: 2 INJECTION INTRAMUSCULAR; INTRAVENOUS at 03:54

## 2024-01-29 RX ADMIN — DIAZEPAM 10 MG: 5 TABLET ORAL at 03:54

## 2024-01-29 ASSESSMENT — LIFESTYLE VARIABLES
NAUSEA AND VOMITING: NO NAUSEA AND NO VOMITING
PAROXYSMAL SWEATS: NO SWEAT VISIBLE
ORIENTATION AND CLOUDING OF SENSORIUM: ORIENTED AND CAN DO SERIAL ADDITIONS
TREMOR: NOT VISIBLE, BUT CAN BE FELT FINGERTIP TO FINGERTIP
TOTAL SCORE: 3
AGITATION: NORMAL ACTIVITY
AUDITORY DISTURBANCES: NOT PRESENT
HEADACHE, FULLNESS IN HEAD: NOT PRESENT
ANXIETY: 2
VISUAL DISTURBANCES: NOT PRESENT

## 2024-01-29 ASSESSMENT — ACTIVITIES OF DAILY LIVING (ADL)
ADLS_ACUITY_SCORE: 35

## 2024-01-29 NOTE — ED PROVIDER NOTES
History     Chief Complaint:  Alcohol Intoxication       HPI   Giovani Damian is a 43 year old male with past medical history of alcohol abuse and alcohol withdrawal who presents emergency department with mild alcohol withdrawal.  Patient reports that he stopped drinking yesterday around 3 PM.  He has been in contact with [facility] detox who plans on picking him up here around 730.  He denies any history of alcohol withdrawal seizures.  Denies any suicidal or homicidal ideation.  Denies any significant nausea or vomiting.  Denies any additional drug use.  Patient reports he brought all of his home medications.  He has not been taking Librium.    Medications:    buprenorphine HCl-naloxone HCl (SUBOXONE) 8-2 MG per film  chlordiazePOXIDE (LIBRIUM) 25 MG capsule  chlordiazePOXIDE (LIBRIUM) 25 MG capsule  clonazePAM (KLONOPIN) 0.5 MG tablet  cloNIDine (CATAPRES) 0.1 MG tablet  hydrOXYzine HCl (ATARAX) 25 MG tablet  lisinopril (ZESTRIL) 10 MG tablet  mirtazapine (REMERON) 30 MG tablet        Past Medical History:    Past Medical History:   Diagnosis Date    Alcohol abuse     Anxiety     Depressive disorder     Hypertension     Seasonal allergic rhinitis        Past Surgical History:    Past Surgical History:   Procedure Laterality Date    MOUTH SURGERY          Physical Exam   Patient Vitals for the past 24 hrs:   BP Temp Temp src Pulse Resp SpO2 Height Weight   01/29/24 0658 -- -- -- 112 14 -- -- --   01/29/24 0657 -- -- -- 111 11 -- -- --   01/29/24 0655 -- -- -- 111 12 -- -- --   01/29/24 0622 -- -- -- 96 20 -- -- --   01/29/24 0621 -- -- -- 97 25 -- -- --   01/29/24 0620 -- -- -- 97 21 -- -- --   01/29/24 0619 -- -- -- 97 21 -- -- --   01/29/24 0607 -- -- -- 105 15 -- -- --   01/29/24 0606 -- -- -- 106 20 -- -- --   01/29/24 0605 -- -- -- 105 18 -- -- --   01/29/24 0604 -- -- -- 105 21 -- -- --   01/29/24 0603 -- -- -- 107 18 -- -- --   01/29/24 0602 -- -- -- 108 16 -- -- --   01/29/24 0601 -- -- -- 111 13 -- -- --  "  01/29/24 0600 -- -- -- 97 22 -- -- --   01/29/24 0549 -- -- -- 96 18 -- -- --   01/29/24 0548 -- -- -- 97 20 -- -- --   01/29/24 0541 -- -- -- 98 20 -- -- --   01/29/24 0540 -- -- -- 97 19 -- -- --   01/29/24 0539 -- -- -- 98 19 -- -- --   01/29/24 0538 -- -- -- 98 16 -- -- --   01/29/24 0305 -- -- -- 105 -- 97 % -- --   01/29/24 0304 -- -- -- 103 10 96 % -- --   01/29/24 0303 (!) 149/109 -- -- 105 13 97 % -- --   01/29/24 0302 (!) 149/109 98.3  F (36.8  C) Oral -- -- -- 1.651 m (5' 5\") 74.8 kg (165 lb)   01/29/24 0259 -- -- -- 106 21 96 % -- --        Physical Exam  General: Patient is awake, alert  Head: The scalp, face, and head appear normal  Eyes: The pupils are equal, round, and reactive to light. Conjunctivae and sclerae are normal  ENT: External acoustic canals are normal. The oropharynx is normal without erythema. Uvula is in the midline  Neck: Normal range of motion.   CV: tachycardiac but regular   Resp: Lungs are clear without wheezes or rales. No respiratory distress.   GI: Abdomen is soft, no rigidity, guarding, or rebound. No distension. No tenderness to palpation in any quadrant.    MS: Normal tone.  Skin: No rash or lesions noted. Normal capillary refill noted  Neuro: Speech is normal and fluent. Face is symmetric. Moving all extremities.   Psych:  Normal affect.  Appropriate interactions.       Emergency Department Course     Laboratory:  Labs Ordered and Resulted from Time of ED Arrival to Time of ED Departure   COMPREHENSIVE METABOLIC PANEL - Abnormal       Result Value    Sodium 136      Potassium 4.3      Carbon Dioxide (CO2) 22      Anion Gap 16 (*)     Urea Nitrogen 21.3 (*)     Creatinine 1.09      GFR Estimate 86      Calcium 8.1 (*)     Chloride 98      Glucose 113 (*)     Alkaline Phosphatase 128      AST 77 (*)      (*)     Protein Total 6.3 (*)     Albumin 4.1      Bilirubin Total 0.8     LIPASE - Abnormal    Lipase 113 (*)    ETHYL ALCOHOL LEVEL - Abnormal    Alcohol ethyl " 0.16 (*)    CBC WITH PLATELETS AND DIFFERENTIAL    WBC Count 7.5      RBC Count 4.97      Hemoglobin 14.8      Hematocrit 42.7      MCV 86      MCH 29.8      MCHC 34.7      RDW 12.7      Platelet Count 303      % Neutrophils 49      % Lymphocytes 44      % Monocytes 5      % Eosinophils 1      % Basophils 1      % Immature Granulocytes 0      NRBCs per 100 WBC 0      Absolute Neutrophils 3.6      Absolute Lymphocytes 3.3      Absolute Monocytes 0.4      Absolute Eosinophils 0.1      Absolute Basophils 0.1      Absolute Immature Granulocytes 0.0      Absolute NRBCs 0.0            Emergency Department Course & Assessments:      Interventions:  Medications   ondansetron (ZOFRAN) injection 4 mg (4 mg Intravenous $Given 1/29/24 0354)   sodium chloride 0.9% BOLUS 1,000 mL (0 mLs Intravenous Stopped 1/29/24 0648)   diazepam (VALIUM) tablet 10 mg (10 mg Oral $Given 1/29/24 0354)        Disposition:  Discharge     Impression & Plan      Medical Decision Making:  Patient a 43-year-old gentleman who presents to the emergency department here with mild alcohol withdrawal.  Patient is arranged to be picked up by [facility] here in the morning but was worried about alcohol withdrawal overnight.  Patient had very mild alcohol withdrawal symptoms and was treated with 1 dose of oral Valium.  Patient remained hemodynamically stable under my care.  Will be discharged at 730 this morning.      Diagnosis:    ICD-10-CM    1. Alcohol abuse  F10.10       2. Alcohol withdrawal syndrome without complication (H)  F10.930            MD Nereida Melchor Christopher Joseph, MD  01/29/24 0791

## 2024-01-29 NOTE — ED TRIAGE NOTES
BIBA for ETOH withdrawal. Pt stopped drinking at 1500 yesterday. Pt reports [facility] is picking him up here at 0730. Pt c/o shaking. Pt denies feeling suicidal or homicidal. Pt denies hx of seizures. Pt talked to detox PTA and told him to come here since it is after hours.

## 2024-01-29 NOTE — DISCHARGE INSTRUCTIONS
Please use the below resources and your primary care physician to safely cease alcohol and/or substance use.     Return to the ED if you are having any urgent/life-threatening concerns.     DISCHARGE RESOURCES:  [facility] [phone] (detox), [phone] (outpatient & [facility])    SMART Recovery - self management for addiction recovery:  www.smartrecincuBETy.org    [facility] ~ A Health Crisis Resource & Support Center: [phone].  [facility] [phone]   Substance Abuse and Mental Health Services (www.samhsa.gov)  Harm Reduction Coalition (www.Harmreduction.org)  [place] Opioid Prevention Coalition: www.opioidcoalition.org  Poison control 1-293.304.9835       Sober Support Group Information:  AA/NA & Sponsor/Support  Alcoholics Anonymous (www.alcoholics-anonymous.org)   AA Intergroup service office in [place] (http://www.aastpaul.org/) [phone]  AA Intergroup service office in [facility]: [phone]. (http://www.a[place].org/)  Secular AA (www.secularaa.org)  Narcotics Anonymous (www.nam[place].org) (795) 732-1574   Sober Fun Activities: www.sober-activities.[employer]/[facility]//[facility] Recovery Connection (MRC)  [facility] connects people seeking recovery to resources that help foster and sustain long-term recovery.  Whether you are seeking resources for treatment, transportation, housing, job training, education, health care or other pathways to recovery, [facility] is a great place to start.   Phone: [phone]. www.[place]ium.[employer] (Great listing of all types of recovery and non-recovery related resources)?

## 2024-03-07 ENCOUNTER — OFFICE VISIT (OUTPATIENT)
Dept: BEHAVIORAL HEALTH | Facility: CLINIC | Age: 44
End: 2024-03-07
Payer: COMMERCIAL

## 2024-03-07 ENCOUNTER — TELEPHONE (OUTPATIENT)
Dept: BEHAVIORAL HEALTH | Facility: CLINIC | Age: 44
End: 2024-03-07

## 2024-03-07 ENCOUNTER — LAB (OUTPATIENT)
Dept: LAB | Facility: CLINIC | Age: 44
End: 2024-03-07
Payer: COMMERCIAL

## 2024-03-07 VITALS
HEIGHT: 65 IN | HEART RATE: 62 BPM | OXYGEN SATURATION: 99 % | BODY MASS INDEX: 27.46 KG/M2 | DIASTOLIC BLOOD PRESSURE: 92 MMHG | SYSTOLIC BLOOD PRESSURE: 152 MMHG

## 2024-03-07 DIAGNOSIS — Z79.891 ENCOUNTER FOR MONITORING OPIOID MAINTENANCE THERAPY: ICD-10-CM

## 2024-03-07 DIAGNOSIS — Z51.81 ENCOUNTER FOR MONITORING OPIOID MAINTENANCE THERAPY: ICD-10-CM

## 2024-03-07 DIAGNOSIS — F11.90 OPIOID USE DISORDER: ICD-10-CM

## 2024-03-07 DIAGNOSIS — F41.9 ANXIETY AND DEPRESSION: ICD-10-CM

## 2024-03-07 DIAGNOSIS — Z79.899 CHRONIC PRESCRIPTION BENZODIAZEPINE USE: ICD-10-CM

## 2024-03-07 DIAGNOSIS — F11.20 OPIOID USE DISORDER, SEVERE, DEPENDENCE (H): Primary | ICD-10-CM

## 2024-03-07 DIAGNOSIS — F17.200 NICOTINE USE DISORDER: ICD-10-CM

## 2024-03-07 DIAGNOSIS — F10.90 ALCOHOL USE DISORDER: ICD-10-CM

## 2024-03-07 DIAGNOSIS — F32.A ANXIETY AND DEPRESSION: ICD-10-CM

## 2024-03-07 LAB
ALBUMIN SERPL BCG-MCNC: 4.5 G/DL (ref 3.5–5.2)
ALP SERPL-CCNC: 64 U/L (ref 40–150)
ALT SERPL W P-5'-P-CCNC: 23 U/L (ref 0–70)
AMPHETAMINE QUAL URINE POCT: NEGATIVE
ANION GAP SERPL CALCULATED.3IONS-SCNC: 8 MMOL/L (ref 7–15)
AST SERPL W P-5'-P-CCNC: 19 U/L (ref 0–45)
BARBITURATE QUAL URINE POCT: NEGATIVE
BENZODIAZEPINE QUAL URINE POCT: NEGATIVE
BILIRUB SERPL-MCNC: 0.2 MG/DL
BUN SERPL-MCNC: 14.2 MG/DL (ref 6–20)
BUPRENORPHINE QUAL URINE POCT: ABNORMAL
CALCIUM SERPL-MCNC: 9.4 MG/DL (ref 8.6–10)
CHLORIDE SERPL-SCNC: 101 MMOL/L (ref 98–107)
COCAINE QUAL URINE POCT: NEGATIVE
CREAT SERPL-MCNC: 1 MG/DL (ref 0.67–1.17)
CREATININE QUAL URINE POCT: ABNORMAL
DEPRECATED HCO3 PLAS-SCNC: 27 MMOL/L (ref 22–29)
EGFRCR SERPLBLD CKD-EPI 2021: >90 ML/MIN/1.73M2
FENTANYL UR QL: NORMAL
GLUCOSE SERPL-MCNC: 107 MG/DL (ref 70–99)
INTERNAL QC QUAL URINE POCT: ABNORMAL
Lab: NORMAL
MDMA QUAL URINE POCT: NEGATIVE
METHADONE QUAL URINE POCT: NEGATIVE
METHAMPHETAMINE QUAL URINE POCT: NEGATIVE
OPIATE QUAL URINE POCT: NEGATIVE
OXYCODONE QUAL URINE POCT: NEGATIVE
PERFORMING LABORATORY: NORMAL
PH QUAL URINE POCT: ABNORMAL
PHENCYCLIDINE QUAL URINE POCT: NEGATIVE
POCT KIT EXPIRATION DATE: ABNORMAL
POCT KIT LOT NUMBER: ABNORMAL
POTASSIUM SERPL-SCNC: 4.8 MMOL/L (ref 3.4–5.3)
PROT SERPL-MCNC: 7.1 G/DL (ref 6.4–8.3)
SODIUM SERPL-SCNC: 136 MMOL/L (ref 135–145)
SPECIFIC GRAVITY POCT: 1.01
SPECIMEN STATUS: NORMAL
TEMPERATURE URINE POCT: ABNORMAL
TEST NAME: NORMAL
THC QUAL URINE POCT: NEGATIVE

## 2024-03-07 PROCEDURE — 99000 SPECIMEN HANDLING OFFICE-LAB: CPT | Performed by: FAMILY MEDICINE

## 2024-03-07 PROCEDURE — 99205 OFFICE O/P NEW HI 60 MIN: CPT | Performed by: FAMILY MEDICINE

## 2024-03-07 PROCEDURE — G0480 DRUG TEST DEF 1-7 CLASSES: HCPCS | Performed by: FAMILY MEDICINE

## 2024-03-07 PROCEDURE — 80307 DRUG TEST PRSMV CHEM ANLYZR: CPT | Performed by: FAMILY MEDICINE

## 2024-03-07 PROCEDURE — 36415 COLL VENOUS BLD VENIPUNCTURE: CPT

## 2024-03-07 PROCEDURE — 80307 DRUG TEST PRSMV CHEM ANLYZR: CPT | Mod: 91 | Performed by: FAMILY MEDICINE

## 2024-03-07 PROCEDURE — 80053 COMPREHEN METABOLIC PANEL: CPT

## 2024-03-07 RX ORDER — BUPRENORPHINE AND NALOXONE 8; 2 MG/1; MG/1
1 FILM, SOLUBLE BUCCAL; SUBLINGUAL DAILY
Qty: 30 FILM | Refills: 0 | Status: SHIPPED | OUTPATIENT
Start: 2024-03-07 | End: 2024-08-16

## 2024-03-07 RX ORDER — METHYLPREDNISOLONE SODIUM SUCCINATE 125 MG/2ML
125 INJECTION, POWDER, LYOPHILIZED, FOR SOLUTION INTRAMUSCULAR; INTRAVENOUS
Status: CANCELLED
Start: 2024-03-07

## 2024-03-07 RX ORDER — CHLORDIAZEPOXIDE HYDROCHLORIDE 25 MG/1
25 CAPSULE, GELATIN COATED ORAL 3 TIMES DAILY PRN
COMMUNITY
End: 2024-03-21

## 2024-03-07 RX ORDER — MEPERIDINE HYDROCHLORIDE 25 MG/ML
25 INJECTION INTRAMUSCULAR; INTRAVENOUS; SUBCUTANEOUS EVERY 30 MIN PRN
Status: CANCELLED | OUTPATIENT
Start: 2024-03-07

## 2024-03-07 RX ORDER — PROPRANOLOL HYDROCHLORIDE 20 MG/1
20 TABLET ORAL 2 TIMES DAILY
COMMUNITY

## 2024-03-07 RX ORDER — ALBUTEROL SULFATE 0.83 MG/ML
2.5 SOLUTION RESPIRATORY (INHALATION)
Status: CANCELLED | OUTPATIENT
Start: 2024-03-07

## 2024-03-07 RX ORDER — ALBUTEROL SULFATE 90 UG/1
1-2 AEROSOL, METERED RESPIRATORY (INHALATION)
Status: CANCELLED
Start: 2024-03-07

## 2024-03-07 RX ORDER — DIPHENHYDRAMINE HYDROCHLORIDE 50 MG/ML
50 INJECTION INTRAMUSCULAR; INTRAVENOUS
Status: CANCELLED
Start: 2024-03-07

## 2024-03-07 RX ORDER — EPINEPHRINE 1 MG/ML
0.3 INJECTION, SOLUTION, CONCENTRATE INTRAVENOUS EVERY 5 MIN PRN
Status: CANCELLED | OUTPATIENT
Start: 2024-03-07

## 2024-03-07 RX ORDER — BUPRENORPHINE HYDROCHLORIDE AND NALOXONE HYDROCHLORIDE DIHYDRATE 2; .5 MG/1; MG/1
1 TABLET SUBLINGUAL 3 TIMES DAILY
COMMUNITY
End: 2024-03-07

## 2024-03-07 RX ORDER — LIDOCAINE HYDROCHLORIDE 10 MG/ML
2 INJECTION, SOLUTION EPIDURAL; INFILTRATION; INTRACAUDAL; PERINEURAL ONCE
Status: CANCELLED | OUTPATIENT
Start: 2024-03-07 | End: 2024-03-07

## 2024-03-07 RX ORDER — CLONAZEPAM 0.5 MG/1
0.5 TABLET ORAL DAILY PRN
COMMUNITY
Start: 2024-03-07

## 2024-03-07 ASSESSMENT — PATIENT HEALTH QUESTIONNAIRE - PHQ9: SUM OF ALL RESPONSES TO PHQ QUESTIONS 1-9: 10

## 2024-03-07 NOTE — PROGRESS NOTES
[facility] Initial Visit    ASSESSMENT/PLAN                                                      1. Opioid use disorder, severe, dependence (H)  42 yo male with 2 yr h/o kratom use.  Last use 1/28/24.  Buprenorphine started while in detox in [place], [place].  Interested in transfer to Sublocade.   Rx for SL buprenorphine 8mg/day.   Sublocade tentatively scheduled for 3/21/24 pending insurance approval.   Will discontinue SL buprenorphine upon initiation of Sublocade.   Reviewed safe storage of buprenorphine out of sight/reach of children.   Naloxone rx provided  Baseline labs today, pt declined repeat hep C and HIV screening, reports this was negative at detox last month.   Pt reports he completed one month residential programming in [place] following detox.  Discuss interest in IOP/OP at follow-up visit.   Encouraged mutual support groups.   - [facility]; [id number] (Laboratory Miscellaneous Order); Future  - naloxone (NARCAN) 4 MG/0.1ML nasal spray; Spray 1 spray (4 mg) into one nostril alternating nostrils as needed for opioid reversal every 2-3 minutes until assistance arrives  Dispense: 0.2 mL; Refill: 11  - buprenorphine HCl-naloxone HCl (SUBOXONE) 8-2 MG per film; Place 1 Film under the tongue daily  Dispense: 30 Film; Refill: 0  - Comprehensive metabolic panel (BMP + Alb, Alk Phos, ALT, AST, Total. Bili, TP); Future  - [facility]; [id number]; kratom/mitragynine (Laboratory Miscellaneous Order); Future  - [facility]; [id number]; kratom/mitragynine (Laboratory Miscellaneous Order)  - [id number]; kratom/mitragynine: [facility] Miscellaneous Test    2. Encounter for monitoring opioid maintenance therapy  - Drugs of Abuse Screen Urine (POC CUPS) POCT; Standing  - Fentanyl Qualitative with Reflex to Quant Urine; Future  - Drugs of Abuse Screen Urine (POC CUPS) POCT  - [facility]; [id number] (Laboratory Miscellaneous Order); Future  - Comprehensive metabolic panel (BMP + Alb, Alk  Phos, ALT, AST, Total. Bili, TP); Future  - Fentanyl Qualitative with Reflex to Quant Urine    3. Alcohol use disorder  Pt reporting intermittent binge drinking.   Denies cravings currently.    Pt reports he completed one month residential programming in [place] following detox.  Discuss interest in IOP/OP at follow-up visit.   Consider gabapentin or topiramate at follow-up   Encouraged mutual support groups.   Follow-up CMP, pt has h/o transaminitis   Reviewed risks of concurrent use of opioids with benzodiazepines, alcohol, or other sedatives.   - Comprehensive metabolic panel (BMP + Alb, Alk Phos, ALT, AST, Total. Bili, TP); Future    4. Nicotine use disorder  Evaluate pt's goals at follow-up and support as indicated    5. Chronic prescription benzodiazepine use  6. Anxiety and depression  Pt is prescribed prn clonazepam and chlordiazepoxide from his psychiatrist Dr. Severino De Souza.  Pt states he has not taken any benzodiazepines since he entered detox.  Also states he has an appt with psychiatry in the near future and planned to discuss recent events and buprenorphine rx.  Reviewed risks of concurrent use of opioids with benzodiazepines, alcohol, or other sedatives.        Return in 2 weeks (on 3/21/2024).    Patient counseling completed today:  Discussed mechanism of action, potential risks/benefits/side effects of medications and other recommendations above.    Harm reduction counseling including never use alone, availability of naloxone, avoiding combination of opioids with benzodiazepines, alcohol, or other sedatives, safer administration.      Discussed importance of avoiding isolation, building a network of supportive relationships, avoiding people/places/things associated with past use to reduce risk of relapse; including motivational interviewing regarding psychosocial treatment for addiction.     SUBJECTIVE                                                      CC/HPI:  Giovani Damian is a 43 year old  male with PMH SATNAM, panic disorder, tobacco use disorder, alcohol use disorder, and opioid use disorder on buprenorphine who presents to the [facility] for initial visit.      Brief History:  Giovani Damian was first seen in Recovery Clinic on 03/07/24. They were referred by [facility] ED after pt presented there on 1/5/24 c/o withdrawal symptoms from kratom.   After multiple subsequent ED visits for alcohol intoxication and withdrawal, pt traveled to [place], [place] for detox.  He reports he stayed at this facility for 30 days for residential treatment.  He was treated for alcohol withdrawal and started on buprenorphine while in [place].  Discharged on buprenorphine 6mg/day (2mg tid.)  He is interested in transfer to [facility].  Reports control of symptoms with buprenorphine.     Substance Use History :  Opioids:   Age at first use: 41, use increased age 42  Current use: substance: Kratom ; quantity tincture shots, 6-8/day; route: oral liquid ; timing of last use: 1/28/2024     IV drug use: No   History of overdose: No  Previous residential or outpatient treatments for addiction : Yes: has been to 5 inpatient treatments - also for alcohol abuse.  Previous medication treatments for addiction: Yes: Suboxone   Longest period of sobriety: 5 years for alcohol. Only a couple days from Kratom   Medical complications related to substance use: elevated liver enzymes, low testosterone   Hepatitis C: 8/30/23 HCV ab nonreactive  HIV: 8/30/23 HIV ag/ab nonreactive    Taking buprenorphine? Yes: started in [place] ED and then detox in [place] How much per day? 2 mg TID  Number of buprenorphine films/tablets remaining currently: 0  Side effects related to buprenorphine No - got through constipation   Narcan currently available: No    DSM-5 OUD criteria met:  Taken in larger amounts/greater time spent in behavior over longer period of time than intended  Persistent desire or unsuccessful efforts to cut down or control use/behavior  A  great deal of time is spent in activities necessary to obtain the substance/participate in the behavior or recover from its effects  Cravings  Continued use/behavior despite having persistent or recurrent social or interpersonal problems caused or exacerbated by effects of use/behavior  Important social, occupational, or recreational activities are given up or reduced because of use/behavior  Tolerance  Withdrawal    Other Addiction History:  Stimulants   Cocaine in 20's. No use in 20+ years  Sedatives/hypnotics/anxiolytics:   Prescribed as needed from psychiatry   Alcohol:   Hx of dependence.  Began at age 15. Binge drinking most recently   Nicotine:   vape  Cannabis:   One time in the last couple years. A lot when younger  Hallucinogens/Dissociatives:   None since high school  Eating disorder:  None   Gambling:   None        [place] Prescription Drug Monitoring Program Reviewed:  Yes  02/27/2024 11/09/2023 2 Clonazepam 0.5 Mg Tablet 30.00 30 Mi Romario [id number] [facility] ([id number]) 2/2 1.00 LME [place]   01/28/2024 01/28/2024 2 Chlordiazepoxide 25 Mg Capsule 15.00 5 Li Puneet [id number] [facility] (4938) 0/0 3.00 [place]   01/24/2024 01/24/2024 2 Chlordiazepoxide 25 Mg Capsule 20.00 5 Ni Dawson [id number] [facility] (4938) 0/0 4.00 [place]   01/17/2024 01/17/2024 2 Clonazepam 0.5 Mg Tablet 30.00 15 Garcia Nan [id number] [facility] (4938) 0/0 2.00 [place]   01/05/2024 01/05/2024 3 Buprenorphine-Nalox 8-2mg Film 9.00 5 Ca Evgeny [id number] [facility] (6405) 0/0 14.40 mg [place]         PAST PSYCHIATRIC HISTORY:  Diagnoses- Panic disorder and depression  Suicide Attempts: No   Hospitalizations: No         2/18/2020     6:26 PM 3/7/2024     9:00 AM   PHQ   PHQ-9 Total Score 13 10   Q9: Thoughts of better off dead/self-harm past 2 weeks Not at all Not at all         If PHQ-9 score of 15 or higher, has [facility] therapist or provider been notified? N/A    Any current suicidal ideation? No  If yes, has [facility] therapist or provider been  "notified? N/A    Mental health provider: Psychiatry  Dr. Severino De Souza      Social History  Housing status:  [facility]  Employment status: Employed full time -   Relationship status:   Children: 3 children  Legal: on probation   Insurance needs: Active   Contact information up to date? Yes    3rd Party Involvement: [facility] for psychiatry         Medications:  chlordiazePOXIDE (LIBRIUM) 25 MG capsule, Take 25 mg by mouth 3 times daily as needed for anxiety  cloNIDine (CATAPRES) 0.1 MG tablet, Take 0.1 mg by mouth 2 times daily  hydrOXYzine HCl (ATARAX) 25 MG tablet, Take 25 mg by mouth 2 times daily as needed for anxiety  lisinopril (ZESTRIL) 10 MG tablet, Take 10 mg by mouth daily  mirtazapine (REMERON) 30 MG tablet, Take 30 mg by mouth At Bedtime  propranolol (INDERAL) 20 MG tablet, Take 20 mg by mouth 2 times daily    No current facility-administered medications on file prior to visit.      No Known Allergies    Past Medical History:   Diagnosis Date    Alcohol abuse     Anxiety     Depressive disorder     Hypertension     Seasonal allergic rhinitis        Past Surgical History:   Procedure Laterality Date    MOUTH SURGERY         Family History   Problem Relation Age of Onset    No Known Problems Mother     No Known Problems Father     No Known Problems Maternal Grandmother     No Known Problems Maternal Grandfather     No Known Problems Paternal Grandmother     No Known Problems Paternal Grandfather     No Known Problems Brother          REVIEW OF SYSTEMS:  No withdrawal symptoms currently  Has not taken any benzodiazepines since detox.  Does not currently have any rx benzodiazepines, reports his anxiety levels have been low since starting buprenorphine.   Denies cravings for alcohol    OBJECTIVE                                                      BP (!) 152/92   Pulse 62   Ht 1.651 m (5' 5\")   SpO2 99%   BMI 27.46 kg/m      Physical Exam  Constitutional:       Appearance: Normal " appearance. He is well-groomed. He is not diaphoretic.   HENT:      Head: Normocephalic and atraumatic.      Nose: No rhinorrhea.   Eyes:      General: No scleral icterus.     Extraocular Movements: Extraocular movements intact.      Conjunctiva/sclera: Conjunctivae normal.   Pulmonary:      Effort: Pulmonary effort is normal.   Neurological:      Mental Status: He is alert and oriented to person, place, and time.      Coordination: Coordination is intact.      Gait: Gait is intact.   Psychiatric:         Attention and Perception: Attention and perception normal.         Mood and Affect: Mood and affect normal.         Speech: Speech normal.         Behavior: Behavior is cooperative.         Thought Content: Thought content normal.      Comments: Insight and judgement are good         Labs:    UDS:   Lab Results   Component Value Date    BUP Screen Positive (A) 03/07/2024    BZO Negative 03/07/2024    BAR Negative 03/07/2024    SURI Negative 03/07/2024    MAMP Negative 03/07/2024    AMP Negative 03/07/2024    MDMA Negative 03/07/2024    MTD Negative 03/07/2024    [id number] Negative 03/07/2024    OXY Negative 03/07/2024    PCP Negative 03/07/2024    THC Negative 03/07/2024    TEMP 90 F 03/07/2024    SGPOCT 1.015 03/07/2024       *POC urine drug screen does not screen for Fentanyl    Recent Results (from the past 720 hour(s))   Drugs of Abuse Screen Urine (POC CUPS) POCT    Collection Time: 03/07/24  9:53 AM   Result Value Ref Range    POCT Kit Lot Number [id number]     POCT Kit Expiration Date 2025-10-23     Temperature Urine POCT 90 F 90 F, 92 F, 94 F, 96 F, 98 F, 100 F    Specific [place] POCT 1.015 1.005, 1.015, 1.025    pH Qual Urine POCT 7 pH 4 pH, 5 pH, 7 pH, 9 pH    Creatinine Qual Urine POCT 100 mg/dL 20 mg/dL, 50 mg/dL, 100 mg/dL, 200 mg/dL    Internal QC Qual Urine POCT Valid Valid    Amphetamine Qual Urine POCT Negative Negative    Barbiturate Qual Urine POCT Negative Negative    Buprenorphine Qual Urine POCT  Screen Positive (A) Negative    Benzodiazepine Qual Urine POCT Negative Negative    Cocaine Qual Urine POCT Negative Negative    Methamphetamine Qual Urine POCT Negative Negative    MDMA Qual Urine POCT Negative Negative    Methadone Qual Urine POCT Negative Negative    Opiate Qual Urine POCT Negative Negative    Oxycodone Qual Urine POCT Negative Negative    Phencyclidine Qual Urine POCT Negative Negative    THC Qual Urine POCT Negative Negative   Comprehensive metabolic panel (BMP + Alb, Alk Phos, ALT, AST, Total. Bili, TP)    Collection Time: 03/07/24 11:26 AM   Result Value Ref Range    Sodium 136 135 - 145 mmol/L    Potassium 4.8 3.4 - 5.3 mmol/L    Carbon Dioxide (CO2) 27 22 - 29 mmol/L    Anion Gap 8 7 - 15 mmol/L    Urea Nitrogen 14.2 6.0 - 20.0 mg/dL    Creatinine 1.00 0.67 - 1.17 mg/dL    GFR Estimate >90 >60 mL/min/1.73m2    Calcium 9.4 8.6 - 10.0 mg/dL    Chloride 101 98 - 107 mmol/L    Glucose 107 (H) 70 - 99 mg/dL    Alkaline Phosphatase 64 40 - 150 U/L    AST 19 0 - 45 U/L    ALT 23 0 - 70 U/L    Protein Total 7.1 6.4 - 8.3 g/dL    Albumin 4.5 3.5 - 5.2 g/dL    Bilirubin Total 0.2 <=1.2 mg/dL   Fentanyl Qualitative with Reflex to Quant Urine    Collection Time: 03/07/24 11:27 AM   Result Value Ref Range    Fentanyl Qual Urine Screen Negative Screen Negative   [facility]; [id number]; kratom/mitragynine (Laboratory Miscellaneous Order)    Collection Time: 03/07/24 11:27 AM   Result Value Ref Range    See Scanned Result       Specimen received. Reordered and sent to performing laboratory. Report to follow up on completion.    Performing Laboratory [facility]     Test Name       Kratom (Mitragynine) - Screen With Reflex to Confirmation, Urine    Test Code [id number]            At least 60 min spent on day of encounter in review of medical record,  review, obtaining histories, discussing recommendations, counseling, providing support.      Mame Pinedo MD  Addiction Medicine  [facility]  [facility]  [address] [place] [address] [address] [place] [id number]  [phone]

## 2024-03-07 NOTE — TELEPHONE ENCOUNTER
Pt is scheduled for first Sublocade 3/21/24.   He has [employer] commercial insurance.   He is registered for [employer] copay assistance, this is information for that:    RXBIN [id number]  RxPCN OHCP  RxGRP [id number]  ISSUER: [id number]        - I have reviewed recommendations for comprehensive treatment plan with the patient  - I have reviewed the patient's medications comprehensively  and provided education to the patient on risks associated with concurrent use of benzodiazepines, alcohol, other sedatives with opioids  - I have recommended concomitant psychosocial support  - I have complied with all aspects of REMS program for Sublocade. [facility] where Sublocade will be administered is in compliance with all aspects of REMS program.   - Patient meets DSM-5 criteria for moderate or severe opioid use disorder  - Patient has been prescribed buprenorphine 8-24mg/day for at least one 1 week at time of Sublocade, demonstrating tolerance of sublingual buprenorphine and control of withdrawal symptoms and cravings.   - Patient will discontinue sublingual buprenorphine when Sublocade is started  - Patient does not have evidence of tampering or attempts to remove the depot at the injection site.   - Patient does not have severe hepatic impairment.   - Patient does not have adrenal insufficiency.   - Patient does not have a history of long QT syndrome  - Patient does not take any antiarrhythmic medications.  He occasionally takes low dose hydroxyzine which has a low risk of influencing QT interval.   - Urine Drug Screen on 3/7/24 was positive for buprenorphine  - Patient will not be receiving methadone while on Sublocade  - Patient will not be receiving any other long acting buprenorphine products or long acting naltrexone while on Sublocade

## 2024-03-21 ENCOUNTER — INFUSION THERAPY VISIT (OUTPATIENT)
Dept: INFUSION THERAPY | Facility: CLINIC | Age: 44
End: 2024-03-21
Attending: FAMILY MEDICINE
Payer: COMMERCIAL

## 2024-03-21 ENCOUNTER — OFFICE VISIT (OUTPATIENT)
Dept: BEHAVIORAL HEALTH | Facility: CLINIC | Age: 44
End: 2024-03-21
Payer: COMMERCIAL

## 2024-03-21 VITALS — SYSTOLIC BLOOD PRESSURE: 132 MMHG | HEART RATE: 73 BPM | DIASTOLIC BLOOD PRESSURE: 83 MMHG

## 2024-03-21 VITALS — SYSTOLIC BLOOD PRESSURE: 130 MMHG | TEMPERATURE: 99 F | HEART RATE: 68 BPM | DIASTOLIC BLOOD PRESSURE: 84 MMHG

## 2024-03-21 DIAGNOSIS — F11.20 OPIOID USE DISORDER, SEVERE, DEPENDENCE (H): Primary | ICD-10-CM

## 2024-03-21 DIAGNOSIS — Z79.899 CHRONIC PRESCRIPTION BENZODIAZEPINE USE: ICD-10-CM

## 2024-03-21 DIAGNOSIS — F10.90 ALCOHOL USE DISORDER: ICD-10-CM

## 2024-03-21 DIAGNOSIS — Z51.81 ENCOUNTER FOR MONITORING OPIOID MAINTENANCE THERAPY: ICD-10-CM

## 2024-03-21 DIAGNOSIS — Z79.891 ENCOUNTER FOR MONITORING OPIOID MAINTENANCE THERAPY: ICD-10-CM

## 2024-03-21 LAB
AMPHETAMINE QUAL URINE POCT: NEGATIVE
BARBITURATE QUAL URINE POCT: NEGATIVE
BENZODIAZEPINE QUAL URINE POCT: NEGATIVE
BUPRENORPHINE QUAL URINE POCT: ABNORMAL
COCAINE QUAL URINE POCT: NEGATIVE
CREATININE QUAL URINE POCT: ABNORMAL
INTERNAL QC QUAL URINE POCT: ABNORMAL
MDMA QUAL URINE POCT: NEGATIVE
METHADONE QUAL URINE POCT: NEGATIVE
METHAMPHETAMINE QUAL URINE POCT: NEGATIVE
OPIATE QUAL URINE POCT: NEGATIVE
OXYCODONE QUAL URINE POCT: NEGATIVE
PH QUAL URINE POCT: ABNORMAL
PHENCYCLIDINE QUAL URINE POCT: NEGATIVE
POCT KIT EXPIRATION DATE: ABNORMAL
POCT KIT LOT NUMBER: ABNORMAL
SPECIFIC GRAVITY POCT: 1.01
TEMPERATURE URINE POCT: ABNORMAL
THC QUAL URINE POCT: NEGATIVE

## 2024-03-21 PROCEDURE — 250N000009 HC RX 250: Performed by: FAMILY MEDICINE

## 2024-03-21 PROCEDURE — 250N000011 HC RX IP 250 OP 636: Mod: JZ | Performed by: FAMILY MEDICINE

## 2024-03-21 PROCEDURE — 99214 OFFICE O/P EST MOD 30 MIN: CPT | Performed by: NURSE PRACTITIONER

## 2024-03-21 PROCEDURE — 80305 DRUG TEST PRSMV DIR OPT OBS: CPT | Performed by: NURSE PRACTITIONER

## 2024-03-21 PROCEDURE — 96372 THER/PROPH/DIAG INJ SC/IM: CPT | Performed by: FAMILY MEDICINE

## 2024-03-21 RX ORDER — METHYLPREDNISOLONE SODIUM SUCCINATE 125 MG/2ML
125 INJECTION, POWDER, LYOPHILIZED, FOR SOLUTION INTRAMUSCULAR; INTRAVENOUS
Status: CANCELLED
Start: 2024-04-18

## 2024-03-21 RX ORDER — LIDOCAINE HYDROCHLORIDE 10 MG/ML
2 INJECTION, SOLUTION EPIDURAL; INFILTRATION; INTRACAUDAL; PERINEURAL ONCE
Status: CANCELLED | OUTPATIENT
Start: 2024-04-18 | End: 2024-04-18

## 2024-03-21 RX ORDER — ALBUTEROL SULFATE 90 UG/1
1-2 AEROSOL, METERED RESPIRATORY (INHALATION)
Status: CANCELLED
Start: 2024-04-18

## 2024-03-21 RX ORDER — EPINEPHRINE 1 MG/ML
0.3 INJECTION, SOLUTION, CONCENTRATE INTRAVENOUS EVERY 5 MIN PRN
Status: CANCELLED | OUTPATIENT
Start: 2024-04-18

## 2024-03-21 RX ORDER — DIPHENHYDRAMINE HYDROCHLORIDE 50 MG/ML
50 INJECTION INTRAMUSCULAR; INTRAVENOUS
Status: CANCELLED
Start: 2024-04-18

## 2024-03-21 RX ORDER — LIDOCAINE HYDROCHLORIDE 10 MG/ML
2 INJECTION, SOLUTION EPIDURAL; INFILTRATION; INTRACAUDAL; PERINEURAL ONCE
Status: COMPLETED | OUTPATIENT
Start: 2024-03-21 | End: 2024-03-21

## 2024-03-21 RX ORDER — ALBUTEROL SULFATE 0.83 MG/ML
2.5 SOLUTION RESPIRATORY (INHALATION)
Status: CANCELLED | OUTPATIENT
Start: 2024-04-18

## 2024-03-21 RX ORDER — MEPERIDINE HYDROCHLORIDE 25 MG/ML
25 INJECTION INTRAMUSCULAR; INTRAVENOUS; SUBCUTANEOUS EVERY 30 MIN PRN
Status: CANCELLED | OUTPATIENT
Start: 2024-04-18

## 2024-03-21 RX ADMIN — LIDOCAINE HYDROCHLORIDE 2 ML: 10 INJECTION, SOLUTION EPIDURAL; INFILTRATION; INTRACAUDAL; PERINEURAL at 10:35

## 2024-03-21 RX ADMIN — BUPRENORPHINE 300 MG: 300 SOLUTION SUBCUTANEOUS at 10:42

## 2024-03-21 ASSESSMENT — PATIENT HEALTH QUESTIONNAIRE - PHQ9: SUM OF ALL RESPONSES TO PHQ QUESTIONS 1-9: 4

## 2024-03-21 ASSESSMENT — PAIN SCALES - GENERAL: PAINLEVEL: MILD PAIN (3)

## 2024-03-21 NOTE — PROGRESS NOTES
[facility] Follow Up    ASSESSMENT/PLAN                                                        1. Opioid use disorder, severe, dependence (H)  Reports symptoms have been controlled with buprenorphine. Received #1 sublcoade injection today prior to visit which was well tolerated. Patient took a dose of SL buprenorphine today prior to injection and threw the remaining prescription of suboxone into the garbage. Explained that sometimes people experience intermittent withdrawal/cravings with sublocade and take SL buprenorphine to relieve symptoms.Patient will notify clinic if his symptoms are not managed with Sublocade.   Confirms narcan access  Continue recovery supports.      2. Alcohol use disorder  Denies cravings r return to use.   Continue AA meeting attendance    3. Chronic prescription benzodiazepine use  Is prescribed clonazepam 0.5 mg daily as needed for panic by psychiatry. Cautioned concurrent use of multiple CNS depressants such as alcohol, BDZ, and opioids (including buprenorphine), which can cause respiratory depression, overdose, and death.     4. Encounter for monitoring opioid maintenance therapy  - Drugs of Abuse Screen Urine (POC CUPS) POCT       Return in about 4 weeks (around 4/18/2024) for Follow up, with me, in person 230pm.      Patient counseling completed today:  Discussed mechanism of action, potential risks/benefits/side effects of medications and other recommendations above.     Discussed risk of precipitated withdrawal with initiation of buprenorphine in the presence of full opioid agonists.    Reviewed directions for initiation of buprenorphine to reduce risk of precipitated withdrawal and maximize efficacy.    Harm reduction counseling including never use alone, availability of naloxone, risks associated with concurrent use of opioids and benzodiazepines, alcohol, or other sedatives, safer administration as applicable.  Discussed importance of avoiding  isolation, building a network of supportive relationships, avoiding people/places/things associated with past use to reduce risk of relapse; including motivational interviewing regarding psychosocial interventions.   SUBJECTIVE                                                        CC/HPI:  Giovani Damian is a 43 year old male with PMH SATNAM, panic disorder, tobacco use disorder, alcohol use disorder, and opioid use disorder on buprenorphine who presents to the [facility] for initial visit.       Brief History:  Giovani Damian was first seen in Recovery Clinic on 03/07/24. They were referred by [facility] ED after pt presented there on 1/5/24 c/o withdrawal symptoms from kratom.   After multiple subsequent ED visits for alcohol intoxication and withdrawal, pt traveled to [place], [place] for detox.  He reports he stayed at this facility for 30 days for residential treatment.  He was treated for alcohol withdrawal and started on buprenorphine while in [place].  Discharged on buprenorphine 6mg/day (2mg tid.)  He is interested in transfer to [facility].  Reports control of symptoms with buprenorphine.      Substance Use History :  Opioids:   Age at first use: 41, use increased age 42  Current use: substance: Kratom ; quantity tincture shots, 6-8/day; route: oral liquid ; timing of last use: 1/28/2024                IV drug use: No   History of overdose: No  Previous residential or outpatient treatments for addiction : Yes: has been to 5 inpatient treatments - also for alcohol abuse.  Previous medication treatments for addiction: Yes: Suboxone   Longest period of sobriety: 5 years for alcohol. Only a couple days from Kratom   Medical complications related to substance use: elevated liver enzymes, low testosterone   Hepatitis C: 8/30/23 HCV ab nonreactive  HIV: 8/30/23 HIV ag/ab nonreactive     Taking buprenorphine? Yes: started in [place] ED and then detox in [place] How much per day? 2 mg TID  Number of buprenorphine  films/tablets remaining currently: 0  Side effects related to buprenorphine No - got through constipation        DSM-5 OUD criteria met:  Taken in larger amounts/greater time spent in behavior over longer period of time than intended  Persistent desire or unsuccessful efforts to cut down or control use/behavior  A great deal of time is spent in activities necessary to obtain the substance/participate in the behavior or recover from its effects  Cravings  Continued use/behavior despite having persistent or recurrent social or interpersonal problems caused or exacerbated by effects of use/behavior  Important social, occupational, or recreational activities are given up or reduced because of use/behavior  Tolerance  Withdrawal     Other Addiction History:  Stimulants   Cocaine in 20's. No use in 20+ years  Sedatives/hypnotics/anxiolytics:   Prescribed as needed from psychiatry   Alcohol:   Hx of dependence.  Began at age 15. Binge drinking most recently   Nicotine:   vape  Cannabis:   One time in the last couple years. A lot when younger  Hallucinogens/Dissociatives:   None since high school  Eating disorder:  None   Gambling:              None       PAST PSYCHIATRIC HISTORY:  Diagnoses- Panic disorder and depression  Suicide Attempts: No   Hospitalizations: No       2/18/2020     6:26 PM 3/7/2024     9:00 AM 3/21/2024    10:00 AM   PHQ   PHQ-9 Total Score 13 10 4   Q9: Thoughts of better off dead/self-harm past 2 weeks Not at all Not at all Not at all       Social History  Housing status:  [facility]  Employment status: Employed full time -   Relationship status:   Children: 3 children  Legal: on probation       Most recent Recovery Clinic visit 3/7/24.    A/P last visit:  1. Opioid use disorder, severe, dependence (H)  42 yo male with 2 yr h/o kratom use.  Last use 1/28/24.  Buprenorphine started while in detox in [place], [place].  Interested in transfer to [facility].   Rx for SL  buprenorphine 8mg/day.   Sublocade tentatively scheduled for 3/21/24 pending insurance approval.   Will discontinue SL buprenorphine upon initiation of Sublocade.   Reviewed safe storage of buprenorphine out of sight/reach of children.   Naloxone rx provided  Baseline labs today, pt declined repeat hep C and HIV screening, reports this was negative at detox last month.   Pt reports he completed one month residential programming in [place] following detox.  Discuss interest in IOP/OP at follow-up visit.   Encouraged mutual support groups.   - Billetto; [id number] (Laboratory Miscellaneous Order); Future  - naloxone (NARCAN) 4 MG/0.1ML nasal spray; Spray 1 spray (4 mg) into one nostril alternating nostrils as needed for opioid reversal every 2-3 minutes until assistance arrives  Dispense: 0.2 mL; Refill: 11  - buprenorphine HCl-naloxone HCl (SUBOXONE) 8-2 MG per film; Place 1 Film under the tongue daily  Dispense: 30 Film; Refill: 0  - Comprehensive metabolic panel (BMP + Alb, Alk Phos, ALT, AST, Total. Bili, TP); Future  - [facility]; [id number]; kratom/mitragynine (Laboratory Miscellaneous Order); Future  - Billetto; [id number]; kratom/mitragynine (Laboratory Miscellaneous Order)  - [id number]; kratom/mitragynine: [facility] Miscellaneous Test     2. Encounter for monitoring opioid maintenance therapy  - Drugs of Abuse Screen Urine (POC CUPS) POCT; Standing  - Fentanyl Qualitative with Reflex to Quant Urine; Future  - Drugs of Abuse Screen Urine (POC CUPS) POCT  - Billetto; [id number] (Laboratory Miscellaneous Order); Future  - Comprehensive metabolic panel (BMP + Alb, Alk Phos, ALT, AST, Total. Bili, TP); Future  - Fentanyl Qualitative with Reflex to Quant Urine     3. Alcohol use disorder  Pt reporting intermittent binge drinking.   Denies cravings currently.    Pt reports he completed one month residential programming in [place] following detox.  Discuss interest in IOP/OP at follow-up visit.    Consider gabapentin or topiramate at follow-up   Encouraged mutual support groups.   Follow-up CMP, pt has h/o transaminitis   Reviewed risks of concurrent use of opioids with benzodiazepines, alcohol, or other sedatives.   - Comprehensive metabolic panel (BMP + Alb, Alk Phos, ALT, AST, Total. Bili, TP); Future     4. Nicotine use disorder  Evaluate pt's goals at follow-up and support as indicated     5. Chronic prescription benzodiazepine use  6. Anxiety and depression  Pt is prescribed prn clonazepam and chlordiazepoxide from his psychiatrist Dr. Severino De Souza.  Pt states he has not taken any benzodiazepines since he entered detox.  Also states he has an appt with psychiatry in the near future and planned to discuss recent events and buprenorphine rx.  Reviewed risks of concurrent use of opioids with benzodiazepines, alcohol, or other sedatives.        03/21/24 visit:  Received first sublocade today, tolerated well. Mild discomfort with lidocaine. No redness or irritation at injection site, denies pain.   Took suboxone 8 mg daily, felt worried about taking suboxone today because he was getting the injection. Threw the remaining film in the garbage. He picked up #30 films on 3/7.   Denies alcohol cravings, BDZ cravings.   Is attending AA meetings   Was sober before starting kratom then returned to alcohol use to help with withdrawal.         Labs discussed with patient?  Yes      [place] Prescription Drug Monitoring Program Reviewed:  Yes;   03/07/2024 03/07/2024 4 Buprenorphine-Nalox 8-2mg Film 30.00 30 Li Vol [id number] Sukhjinder (0699) 0/0 8.00 mg Comm Ins [place]   02/27/2024 11/09/2023 2 Clonazepam 0.5 Mg Tablet 30.00 30 Mi Romario [id number] [facility] (7234) 2/2          Medications:  buprenorphine HCl-naloxone HCl (SUBOXONE) 8-2 MG per film, Place 1 Film under the tongue daily  clonazePAM (KLONOPIN) 0.5 MG tablet, Take 1 tablet (0.5 mg) by mouth daily as needed for anxiety  cloNIDine (CATAPRES) 0.1 MG tablet, Take 0.1 mg by mouth 2  times daily  hydrOXYzine HCl (ATARAX) 25 MG tablet, Take 25 mg by mouth 2 times daily as needed for anxiety  lisinopril (ZESTRIL) 10 MG tablet, Take 10 mg by mouth daily  mirtazapine (REMERON) 30 MG tablet, Take 30 mg by mouth At Bedtime  naloxone (NARCAN) 4 MG/0.1ML nasal spray, Spray 1 spray (4 mg) into one nostril alternating nostrils as needed for opioid reversal every 2-3 minutes until assistance arrives (Patient not taking: Reported on 3/21/2024)  propranolol (INDERAL) 20 MG tablet, Take 20 mg by mouth 2 times daily    [COMPLETED] buprenorphine ER (SUBLOCADE) syringe 300 mg  [COMPLETED] lidocaine (PF) (XYLOCAINE) 1 % injection 2 mL        No Known Allergies    PMH, PSH, FamHx reviewed      OBJECTIVE                                                      /83   Pulse 73     Physical Exam  HENT:      Head: Normocephalic.      Nose: Nose normal. No congestion.   Eyes:      Conjunctiva/sclera: Conjunctivae normal.   Cardiovascular:      Rate and Rhythm: Normal rate.   Pulmonary:      Effort: Pulmonary effort is normal. No respiratory distress.   Neurological:      General: No focal deficit present.      Mental Status: He is alert and oriented to person, place, and time.   Psychiatric:         Attention and Perception: Attention normal.         Mood and Affect: Mood normal.         Speech: Speech normal.         Behavior: Behavior is cooperative.         Thought Content: Thought content normal. Thought content does not include suicidal ideation.         Judgment: Judgment normal.         Labs:    UDS:    Lab Results   Component Value Date    BUP Screen Positive (A) 03/21/2024    BZO Negative 03/21/2024    BAR Negative 03/21/2024    SURI Negative 03/21/2024    MAMP Negative 03/21/2024    AMP Negative 03/21/2024    MDMA Negative 03/21/2024    MTD Negative 03/21/2024    [id number] Negative 03/21/2024    OXY Negative 03/21/2024    PCP Negative 03/21/2024    THC Negative 03/21/2024    TEMP 90 F 03/21/2024    SGPOCT 1.015  03/21/2024     *POC urine drug screen does not screen for Fentanyl      Recent Results (from the past 240 hour(s))   Drugs of Abuse Screen Urine (POC CUPS) POCT    Collection Time: 03/21/24 11:01 AM   Result Value Ref Range    POCT Kit Lot Number [id number]     POCT Kit Expiration Date 10/23/25     Temperature Urine POCT 90 F 90 F, 92 F, 94 F, 96 F, 98 F, 100 F    Specific Riner POCT 1.015 1.005, 1.015, 1.025    pH Qual Urine POCT 7 pH 4 pH, 5 pH, 7 pH, 9 pH    Creatinine Qual Urine POCT 50 mg/dL 20 mg/dL, 50 mg/dL, 100 mg/dL, 200 mg/dL    Internal QC Qual Urine POCT Valid Valid    Amphetamine Qual Urine POCT Negative Negative    Barbiturate Qual Urine POCT Negative Negative    Buprenorphine Qual Urine POCT Screen Positive (A) Negative    Benzodiazepine Qual Urine POCT Negative Negative    Cocaine Qual Urine POCT Negative Negative    Methamphetamine Qual Urine POCT Negative Negative    MDMA Qual Urine POCT Negative Negative    Methadone Qual Urine POCT Negative Negative    Opiate Qual Urine POCT Negative Negative    Oxycodone Qual Urine POCT Negative Negative    Phencyclidine Qual Urine POCT Negative Negative    THC Qual Urine POCT Negative Negative         Sharron Martinez, [facility]  [address] [place] [id number] [phone]

## 2024-03-21 NOTE — NURSING NOTE
[facility]      Rooming information:  Approximate last use of full opioid agonist: 1/28/24  Taking buprenorphine? Yes: sublocade How much per day? Monthly   Number of buprenorphine films/tablets remaining currently:   Side effects related to buprenorphine (constipation, dry mouth, sedation?) No   Narcan currently available: Yes  Other recent substance use:    Denies  NICOTINE-Yes:   If using nicotine, ready to quit? No    Point of care urine drug screen positive for:  Lab Results   Component Value Date    BUP Screen Positive (A) 03/21/2024    BZO Negative 03/21/2024    BAR Negative 03/21/2024    SURI Negative 03/21/2024    MAMP Negative 03/21/2024    AMP Negative 03/21/2024    MDMA Negative 03/21/2024    MTD Negative 03/21/2024    [id number] Negative 03/21/2024    OXY Negative 03/21/2024    PCP Negative 03/21/2024    THC Negative 03/21/2024    TEMP 90 F 03/21/2024    SGPOCT 1.015 03/21/2024       *POC urine drug screen does not screen for Fentanyl          3/21/2024    10:00 AM   PHQ Assesment Total Score(s)   PHQ-9 Score 4       If PHQ-9 score of 15 or higher, has Recovery Clinic therapist or provider been notified? N/A    Any current suicidal ideation? No  If yes, has Recovery Clinic therapist or provider been notified? N/A    Primary care provider: [facility]     Mental health provider: yes  (follow up on MH referral if needed)    Insurance needs: active    Housing needs: stable     Current legal issues: none    Contact information up to date? Yes     [employer]  (please obtain HANANE if pt would like to include)    Jonah Persaud MA  March 21, 2024  10:56 AM

## 2024-03-21 NOTE — PROGRESS NOTES
Infusion Nursing Note:  Giovani Damian presents today for sublocade 300 mg injection (first dose).    Patient seen by provider today: Yes: Will see Michelle later this am   present during visit today: Not Applicable.    Note: N/A.      Intravenous Access:  No Intravenous access/labs at this visit.    Treatment Conditions:  Denies relapse.  Taking suboxone as directed.      Post Infusion Assessment:  Patient tolerated injection without incident.  Given in RLQ after 2ml subcutaneous xylocaine administered for local anesthesia.       Discharge Plan:   Discharge instructions reviewed with: Patient.  Reviewed and provided with printed info and wallet card for Sublocade.  Patient discharged in stable condition accompanied by: self.  Departure Mode: Ambulatory.  Will make nest injection appointment on the way out today..      Yanelis Seay

## 2024-04-18 ENCOUNTER — INFUSION THERAPY VISIT (OUTPATIENT)
Dept: INFUSION THERAPY | Facility: CLINIC | Age: 44
End: 2024-04-18
Attending: FAMILY MEDICINE
Payer: COMMERCIAL

## 2024-04-18 ENCOUNTER — OFFICE VISIT (OUTPATIENT)
Dept: BEHAVIORAL HEALTH | Facility: CLINIC | Age: 44
End: 2024-04-18
Payer: COMMERCIAL

## 2024-04-18 VITALS
TEMPERATURE: 96.1 F | DIASTOLIC BLOOD PRESSURE: 82 MMHG | HEART RATE: 82 BPM | SYSTOLIC BLOOD PRESSURE: 132 MMHG | OXYGEN SATURATION: 97 % | RESPIRATION RATE: 16 BRPM

## 2024-04-18 VITALS — HEART RATE: 76 BPM | DIASTOLIC BLOOD PRESSURE: 83 MMHG | SYSTOLIC BLOOD PRESSURE: 131 MMHG

## 2024-04-18 DIAGNOSIS — T40.2X5A THERAPEUTIC OPIOID-INDUCED CONSTIPATION (OIC): Primary | ICD-10-CM

## 2024-04-18 DIAGNOSIS — F10.90 ALCOHOL USE DISORDER: ICD-10-CM

## 2024-04-18 DIAGNOSIS — Z51.81 ENCOUNTER FOR MONITORING OPIOID MAINTENANCE THERAPY: ICD-10-CM

## 2024-04-18 DIAGNOSIS — Z79.891 ENCOUNTER FOR MONITORING OPIOID MAINTENANCE THERAPY: ICD-10-CM

## 2024-04-18 DIAGNOSIS — Z79.899 CHRONIC PRESCRIPTION BENZODIAZEPINE USE: ICD-10-CM

## 2024-04-18 DIAGNOSIS — F11.20 OPIOID USE DISORDER, SEVERE, DEPENDENCE (H): Primary | ICD-10-CM

## 2024-04-18 DIAGNOSIS — F11.20 OPIOID USE DISORDER, SEVERE, DEPENDENCE (H): ICD-10-CM

## 2024-04-18 DIAGNOSIS — K59.03 THERAPEUTIC OPIOID-INDUCED CONSTIPATION (OIC): Primary | ICD-10-CM

## 2024-04-18 LAB
AMPHETAMINE QUAL URINE POCT: NEGATIVE
BARBITURATE QUAL URINE POCT: NEGATIVE
BENZODIAZEPINE QUAL URINE POCT: NEGATIVE
BUPRENORPHINE QUAL URINE POCT: ABNORMAL
COCAINE QUAL URINE POCT: NEGATIVE
CREATININE QUAL URINE POCT: ABNORMAL
INTERNAL QC QUAL URINE POCT: ABNORMAL
MDMA QUAL URINE POCT: NEGATIVE
METHADONE QUAL URINE POCT: NEGATIVE
METHAMPHETAMINE QUAL URINE POCT: NEGATIVE
OPIATE QUAL URINE POCT: NEGATIVE
OXYCODONE QUAL URINE POCT: NEGATIVE
PH QUAL URINE POCT: ABNORMAL
PHENCYCLIDINE QUAL URINE POCT: NEGATIVE
POCT KIT EXPIRATION DATE: ABNORMAL
POCT KIT LOT NUMBER: ABNORMAL
SPECIFIC GRAVITY POCT: 1.02
TEMPERATURE URINE POCT: ABNORMAL
THC QUAL URINE POCT: NEGATIVE

## 2024-04-18 PROCEDURE — 96372 THER/PROPH/DIAG INJ SC/IM: CPT | Performed by: FAMILY MEDICINE

## 2024-04-18 PROCEDURE — 80305 DRUG TEST PRSMV DIR OPT OBS: CPT | Performed by: NURSE PRACTITIONER

## 2024-04-18 PROCEDURE — 99214 OFFICE O/P EST MOD 30 MIN: CPT | Performed by: NURSE PRACTITIONER

## 2024-04-18 PROCEDURE — 250N000009 HC RX 250: Performed by: FAMILY MEDICINE

## 2024-04-18 PROCEDURE — 250N000011 HC RX IP 250 OP 636: Mod: JZ | Performed by: FAMILY MEDICINE

## 2024-04-18 RX ORDER — LIDOCAINE HYDROCHLORIDE 10 MG/ML
2 INJECTION, SOLUTION EPIDURAL; INFILTRATION; INTRACAUDAL; PERINEURAL ONCE
Status: CANCELLED | OUTPATIENT
Start: 2024-05-15 | End: 2024-05-15

## 2024-04-18 RX ORDER — BISACODYL 10 MG
10 SUPPOSITORY, RECTAL RECTAL DAILY PRN
Qty: 5 SUPPOSITORY | Refills: 0 | Status: SHIPPED | OUTPATIENT
Start: 2024-04-18 | End: 2024-05-16

## 2024-04-18 RX ORDER — METHYLPREDNISOLONE SODIUM SUCCINATE 125 MG/2ML
125 INJECTION, POWDER, LYOPHILIZED, FOR SOLUTION INTRAMUSCULAR; INTRAVENOUS
Status: CANCELLED
Start: 2024-05-15

## 2024-04-18 RX ORDER — MEPERIDINE HYDROCHLORIDE 25 MG/ML
25 INJECTION INTRAMUSCULAR; INTRAVENOUS; SUBCUTANEOUS EVERY 30 MIN PRN
Status: CANCELLED | OUTPATIENT
Start: 2024-05-15

## 2024-04-18 RX ORDER — LIDOCAINE HYDROCHLORIDE 10 MG/ML
2 INJECTION, SOLUTION EPIDURAL; INFILTRATION; INTRACAUDAL; PERINEURAL ONCE
Status: COMPLETED | OUTPATIENT
Start: 2024-04-18 | End: 2024-04-18

## 2024-04-18 RX ORDER — SENNA AND DOCUSATE SODIUM 50; 8.6 MG/1; MG/1
1 TABLET, FILM COATED ORAL AT BEDTIME
Qty: 30 TABLET | Refills: 1 | Status: SHIPPED | OUTPATIENT
Start: 2024-04-18

## 2024-04-18 RX ORDER — ALBUTEROL SULFATE 90 UG/1
1-2 AEROSOL, METERED RESPIRATORY (INHALATION)
Status: CANCELLED
Start: 2024-05-15

## 2024-04-18 RX ORDER — DIPHENHYDRAMINE HYDROCHLORIDE 50 MG/ML
50 INJECTION INTRAMUSCULAR; INTRAVENOUS
Status: CANCELLED
Start: 2024-05-15

## 2024-04-18 RX ORDER — ALBUTEROL SULFATE 0.83 MG/ML
2.5 SOLUTION RESPIRATORY (INHALATION)
Status: CANCELLED | OUTPATIENT
Start: 2024-05-15

## 2024-04-18 RX ORDER — EPINEPHRINE 1 MG/ML
0.3 INJECTION, SOLUTION, CONCENTRATE INTRAVENOUS EVERY 5 MIN PRN
Status: CANCELLED | OUTPATIENT
Start: 2024-05-15

## 2024-04-18 RX ADMIN — LIDOCAINE HYDROCHLORIDE 2 ML: 10 INJECTION, SOLUTION EPIDURAL; INFILTRATION; INTRACAUDAL; PERINEURAL at 13:45

## 2024-04-18 RX ADMIN — BUPRENORPHINE 300 MG: 300 SOLUTION SUBCUTANEOUS at 13:49

## 2024-04-18 ASSESSMENT — PAIN SCALES - GENERAL: PAINLEVEL: NO PAIN (0)

## 2024-04-18 ASSESSMENT — PATIENT HEALTH QUESTIONNAIRE - PHQ9: SUM OF ALL RESPONSES TO PHQ QUESTIONS 1-9: 2

## 2024-04-18 NOTE — PROGRESS NOTES
[facility] Follow Up    ASSESSMENT/PLAN                                                        1. Opioid use disorder, severe, dependence (H)  Reporting control of symptoms with sublocade. He received #2 injection today (300 mg). He has not needed any additional SL buprenorphine since starting sublocade.  Continue monthly sublocade, will be starting 100 mg next month  Continue regular meeting attendance  Confirms narcan access.     2. Alcohol use disorder  Denies cravings or return to use. Is going to several Aa meetings every week which he finds is a good support for him.    3. Chronic prescription benzodiazepine use  Is prescribed clonazepam 0.5 mg by psychiatry, aware of risks of multiple CNS depressants.     4. Encounter for monitoring opioid maintenance therapy  - Drugs of Abuse Screen Urine (POC CUPS) POCT  - AST; Future  - ALT; Future    5. Therapeutic opioid-induced constipation (OIC)  Reports ongoing symptoms not relieved with miralax, has been taking it every other day. Encouraged to increase use to BID, and adding senna and suppositories.   - SENNA-docusate sodium (SENNA S) 8.6-50 MG tablet; Take 1 tablet by mouth at bedtime  Dispense: 30 tablet; Refill: 1  - bisacodyl (DULCOLAX) 10 MG suppository; Place 1 suppository (10 mg) rectally daily as needed for constipation  Dispense: 5 suppository; Refill: 0         Return in about 4 weeks (around 5/16/2024).      Patient counseling completed today:  Discussed mechanism of action, potential risks/benefits/side effects of medications and other recommendations above.     Discussed risk of precipitated withdrawal with initiation of buprenorphine in the presence of full opioid agonists.    Reviewed directions for initiation of buprenorphine to reduce risk of precipitated withdrawal and maximize efficacy.    Harm reduction counseling including never use alone, availability of naloxone, risks associated with concurrent use of opioids and  benzodiazepines, alcohol, or other sedatives, safer administration as applicable.  Discussed importance of avoiding isolation, building a network of supportive relationships, avoiding people/places/things associated with past use to reduce risk of relapse; including motivational interviewing regarding psychosocial interventions.   SUBJECTIVE                                                          CC/HPI:  Giovani Damian is a 43 year old male with PMH SATNAM, panic disorder, tobacco use disorder, alcohol use disorder, and opioid use disorder on buprenorphine who presents to the Recovery Clinic for initial visit.       Brief History:  Giovani Damian was first seen in Recovery Clinic on 03/07/24. They were referred by [facility] ED after pt presented there on 1/5/24 c/o withdrawal symptoms from kratom.   After multiple subsequent ED visits for alcohol intoxication and withdrawal, pt traveled to [place], [place] for detox.  He reports he stayed at this facility for 30 days for residential treatment.  He was treated for alcohol withdrawal and started on buprenorphine while in [place].  Discharged on buprenorphine 6mg/day (2mg tid.)  He is interested in transfer to [facility].  Reports control of symptoms with buprenorphine.      Substance Use History :  Opioids:   Age at first use: 41, use increased age 42  Current use: substance: Kratom ; quantity tincture shots, 6-8/day; route: oral liquid ; timing of last use: 1/28/2024                IV drug use: No   History of overdose: No  Previous residential or outpatient treatments for addiction : Yes: has been to 5 inpatient treatments - also for alcohol abuse.  Previous medication treatments for addiction: Yes: Suboxone   Longest period of sobriety: 5 years for alcohol. Only a couple days from Kratom   Medical complications related to substance use: elevated liver enzymes, low testosterone   Hepatitis C: 8/30/23 HCV ab nonreactive  HIV: 8/30/23 HIV ag/ab nonreactive     Taking  buprenorphine? Yes: started in [place] ED and then detox in [place] How much per day? 2 mg TID  Number of buprenorphine films/tablets remaining currently: 0  Side effects related to buprenorphine No - got through constipation         DSM-5 OUD criteria met:  Taken in larger amounts/greater time spent in behavior over longer period of time than intended  Persistent desire or unsuccessful efforts to cut down or control use/behavior  A great deal of time is spent in activities necessary to obtain the substance/participate in the behavior or recover from its effects  Cravings  Continued use/behavior despite having persistent or recurrent social or interpersonal problems caused or exacerbated by effects of use/behavior  Important social, occupational, or recreational activities are given up or reduced because of use/behavior  Tolerance  Withdrawal     Other Addiction History:  Stimulants   Cocaine in 20's. No use in 20+ years  Sedatives/hypnotics/anxiolytics:   Prescribed as needed from psychiatry   Alcohol:   Hx of dependence.  Began at age 15. Binge drinking most recently   Nicotine:   vape  Cannabis:   One time in the last couple years. A lot when younger  Hallucinogens/Dissociatives:   None since high school  Eating disorder:  None   Gambling:              None        PAST PSYCHIATRIC HISTORY:  Diagnoses- Panic disorder and depression  Suicide Attempts: No   Hospitalizations: No       3/7/2024     9:00 AM 3/21/2024    10:00 AM 4/18/2024     2:00 PM   PHQ   PHQ-9 Total Score 10 4 2   Q9: Thoughts of better off dead/self-harm past 2 weeks Not at all Not at all Not at all       Social History  Housing status:  [facility]  Employment status: Employed full time -   Relationship status:   Children: 3 children  Legal: on probation       Most recent Recovery Clinic visit 3/21/2024   Received first sublocade today, tolerated well. Mild discomfort with lidocaine. No redness or irritation at  injection site, denies pain.   Took suboxone 8 mg daily, felt worried about taking suboxone today because he was getting the injection. Threw the remaining film in the garbage. He picked up #30 films on 3/7.   Denies alcohol cravings, BDZ cravings.   Is attending AA meetings   Was sober before starting kratom then returned to alcohol use to help with withdrawal.       A/P last visit:  1. Opioid use disorder, severe, dependence (H)  Reports symptoms have been controlled with buprenorphine. Received #1 sublcoade injection today prior to visit which was well tolerated. Patient took a dose of SL buprenorphine today prior to injection and threw the remaining prescription of suboxone into the garbage. Explained that sometimes people experience intermittent withdrawal/cravings with sublocade and take SL buprenorphine to relieve symptoms.Patient will notify clinic if his symptoms are not managed with Sublocade.   Confirms narcan access  Continue recovery supports.       2. Alcohol use disorder  Denies cravings r return to use.   Continue AA meeting attendance     3. Chronic prescription benzodiazepine use  Is prescribed clonazepam 0.5 mg daily as needed for panic by psychiatry. Cautioned concurrent use of multiple CNS depressants such as alcohol, BDZ, and opioids (including buprenorphine), which can cause respiratory depression, overdose, and death.      4. Encounter for monitoring opioid maintenance therapy  - Drugs of Abuse Screen Urine (POC CUPS) POCT       04/18/24 visit:  Received # 2 sublocade today. Is tolerating well. NO pain or irritation at site   Has not needed any additional buprenorphine, cravings and withdrawal has been well managed.   Denies any substance use, is going to a lot of AA meetings  Is reporting constipation not releived with miralax.       Labs discussed with patient?  Yes      [place] Prescription Drug Monitoring Program Reviewed:  Yes;   04/04/2024 04/04/2024 2 Gabapentin 300 Mg Capsule 270.00  90 [facility] [id number] [facility] ([id number]) 0/1  Comm Ins [place]   03/25/2024 12/28/2023 2 Clonazepam 0.5 Mg Tablet 30.00 30 Mi [facility]             Medications:  Current Outpatient Medications   Medication Sig Dispense Refill    bisacodyl (DULCOLAX) 10 MG suppository Place 1 suppository (10 mg) rectally daily as needed for constipation 5 suppository 0    SENNA-docusate sodium (SENNA S) 8.6-50 MG tablet Take 1 tablet by mouth at bedtime 30 tablet 1    buprenorphine HCl-naloxone HCl (SUBOXONE) 8-2 MG per film Place 1 Film under the tongue daily (Patient not taking: Reported on 4/18/2024) 30 Film 0    clonazePAM (KLONOPIN) 0.5 MG tablet Take 1 tablet (0.5 mg) by mouth daily as needed for anxiety      cloNIDine (CATAPRES) 0.1 MG tablet Take 0.1 mg by mouth 2 times daily      hydrOXYzine HCl (ATARAX) 25 MG tablet Take 25 mg by mouth 2 times daily as needed for anxiety      lisinopril (ZESTRIL) 10 MG tablet Take 10 mg by mouth daily      mirtazapine (REMERON) 30 MG tablet Take 30 mg by mouth At Bedtime      naloxone (NARCAN) 4 MG/0.1ML nasal spray Spray 1 spray (4 mg) into one nostril alternating nostrils as needed for opioid reversal every 2-3 minutes until assistance arrives (Patient not taking: Reported on 3/21/2024) 0.2 mL 11    propranolol (INDERAL) 20 MG tablet Take 20 mg by mouth 2 times daily       No current facility-administered medications for this visit.       No Known Allergies    PMH, PSH, FamHx reviewed      OBJECTIVE                                                      /83   Pulse 76     Physical Exam  Pulmonary:      Effort: Pulmonary effort is normal. No respiratory distress.   Neurological:      General: No focal deficit present.      Mental Status: He is alert and oriented to person, place, and time.   Psychiatric:         Attention and Perception: Attention normal.         Mood and Affect: Mood normal.         Speech: Speech normal.         Behavior: Behavior is cooperative.         Thought Content: Thought content  normal. Thought content does not include suicidal ideation.         Judgment: Judgment normal.         Labs:    UDS:    Lab Results   Component Value Date    BUP Screen Positive (A) 04/18/2024    BZO Negative 04/18/2024    BAR Negative 04/18/2024    SURI Negative 04/18/2024    MAMP Negative 04/18/2024    AMP Negative 04/18/2024    MDMA Negative 04/18/2024    MTD Negative 04/18/2024    ZKT954 Negative 04/18/2024    OXY Negative 04/18/2024    PCP Negative 04/18/2024    THC Negative 04/18/2024    TEMP 92 F 04/18/2024    SGPOCT 1.025 04/18/2024     *POC urine drug screen does not screen for Fentanyl      Recent Results (from the past 240 hour(s))   Drugs of Abuse Screen Urine (POC CUPS) POCT    Collection Time: 04/18/24  2:24 PM   Result Value Ref Range    POCT Kit Lot Number [id number]     POCT Kit Expiration Date 22032974     Temperature Urine POCT 92 F 90 F, 92 F, 94 F, 96 F, 98 F, 100 F    Specific [place] POCT 1.025 1.005, 1.015, 1.025    pH Qual Urine POCT 7 pH 4 pH, 5 pH, 7 pH, 9 pH    Creatinine Qual Urine POCT 50 mg/dL 20 mg/dL, 50 mg/dL, 100 mg/dL, 200 mg/dL    Internal QC Qual Urine POCT Valid Valid    Amphetamine Qual Urine POCT Negative Negative    Barbiturate Qual Urine POCT Negative Negative    Buprenorphine Qual Urine POCT Screen Positive (A) Negative    Benzodiazepine Qual Urine POCT Negative Negative    Cocaine Qual Urine POCT Negative Negative    Methamphetamine Qual Urine POCT Negative Negative    MDMA Qual Urine POCT Negative Negative    Methadone Qual Urine POCT Negative Negative    Opiate Qual Urine POCT Negative Negative    Oxycodone Qual Urine POCT Negative Negative    Phencyclidine Qual Urine POCT Negative Negative    THC Qual Urine POCT Negative Negative             Sharron Martinez, [facility]  [address] [place] [id number] [phone]

## 2024-04-18 NOTE — PROGRESS NOTES
Infusion Nursing Note:  Giovani Damian presents today for sublocade 300mg.    Patient seen by provider today: Yes: will go to  following injection   present during visit today: Not Applicable.    Note: Patient states he is doing well.  Denies relapse or withdrawal/cravings.  No evidence of tampering noted.      Intravenous Access:  No Intravenous access/labs at this visit.    Treatment Conditions:  Not Applicable.      Post Infusion Assessment:  Patient tolerated injection without incident.  Lidocaine given prior to sublocade.  Sublocade injection given in the same trajectory without difficulty.  LLQ      Discharge Plan:   Patient discharged in stable condition accompanied by: self.  Departure Mode: Ambulatory.  Will return to clinic in 4 weeks, appointment scheduled.    Lucie Trujillo RN

## 2024-04-18 NOTE — NURSING NOTE
[facility]      Rooming information:  Approximate last use of full opioid agonist: 1/28/24  Taking buprenorphine? Yes: sublocade  How much per day? mnthly  Number of buprenorphine films/tablets remaining currently: NA  Side effects related to buprenorphine (constipation, dry mouth, sedation?) No   Narcan currently available: Yes  Other recent substance use:    Denies  NICOTINE-Yes:   If using nicotine, ready to quit? No    Point of care urine drug screen positive for:  Lab Results   Component Value Date    BUP Screen Positive (A) 04/18/2024    BZO Negative 04/18/2024    BAR Negative 04/18/2024    SURI Negative 04/18/2024    MAMP Negative 04/18/2024    AMP Negative 04/18/2024    MDMA Negative 04/18/2024    MTD Negative 04/18/2024    [id number] Negative 04/18/2024    OXY Negative 04/18/2024    PCP Negative 04/18/2024    THC Negative 04/18/2024    TEMP 92 F 04/18/2024    SGPOCT 1.025 04/18/2024       *POC urine drug screen does not screen for Fentanyl            4/18/2024     2:00 PM   PHQ Assesment Total Score(s)   PHQ-9 Score 2       If PHQ-9 score of 15 or higher, has Recovery Clinic therapist or provider been notified? No    Any current suicidal ideation? No  If yes, has Recovery Clinic therapist or provider been notified? No    Primary care provider: [facility]     Mental health provider: yes  (follow up on MH referral if needed)    Housing needs: stable    Insurance: active    Current legal issues: none    Contact information up to date? yes    3rd Party Involvement no today (please obtain HANANE if pt would like to include)    Zaki Stroud MA  April 18, 2024  2:21 PM

## 2024-05-16 ENCOUNTER — INFUSION THERAPY VISIT (OUTPATIENT)
Dept: INFUSION THERAPY | Facility: CLINIC | Age: 44
End: 2024-05-16
Attending: FAMILY MEDICINE
Payer: COMMERCIAL

## 2024-05-16 VITALS — HEART RATE: 75 BPM | DIASTOLIC BLOOD PRESSURE: 65 MMHG | TEMPERATURE: 97.2 F | SYSTOLIC BLOOD PRESSURE: 109 MMHG

## 2024-05-16 DIAGNOSIS — F11.20 OPIOID USE DISORDER, SEVERE, DEPENDENCE (H): Primary | ICD-10-CM

## 2024-05-16 PROCEDURE — 250N000011 HC RX IP 250 OP 636: Mod: JZ | Performed by: FAMILY MEDICINE

## 2024-05-16 PROCEDURE — 96372 THER/PROPH/DIAG INJ SC/IM: CPT | Performed by: FAMILY MEDICINE

## 2024-05-16 PROCEDURE — 250N000009 HC RX 250: Performed by: FAMILY MEDICINE

## 2024-05-16 RX ORDER — MEPERIDINE HYDROCHLORIDE 25 MG/ML
25 INJECTION INTRAMUSCULAR; INTRAVENOUS; SUBCUTANEOUS EVERY 30 MIN PRN
Status: CANCELLED | OUTPATIENT
Start: 2024-06-12

## 2024-05-16 RX ORDER — LIDOCAINE HYDROCHLORIDE 10 MG/ML
2 INJECTION, SOLUTION EPIDURAL; INFILTRATION; INTRACAUDAL; PERINEURAL ONCE
Status: COMPLETED | OUTPATIENT
Start: 2024-05-16 | End: 2024-05-16

## 2024-05-16 RX ORDER — EPINEPHRINE 1 MG/ML
0.3 INJECTION, SOLUTION, CONCENTRATE INTRAVENOUS EVERY 5 MIN PRN
Status: CANCELLED | OUTPATIENT
Start: 2024-06-12

## 2024-05-16 RX ORDER — ALBUTEROL SULFATE 90 UG/1
1-2 AEROSOL, METERED RESPIRATORY (INHALATION)
Status: CANCELLED
Start: 2024-06-12

## 2024-05-16 RX ORDER — METHYLPREDNISOLONE SODIUM SUCCINATE 125 MG/2ML
125 INJECTION, POWDER, LYOPHILIZED, FOR SOLUTION INTRAMUSCULAR; INTRAVENOUS
Status: CANCELLED
Start: 2024-06-12

## 2024-05-16 RX ORDER — LIDOCAINE HYDROCHLORIDE 10 MG/ML
2 INJECTION, SOLUTION EPIDURAL; INFILTRATION; INTRACAUDAL; PERINEURAL ONCE
Status: CANCELLED | OUTPATIENT
Start: 2024-06-12 | End: 2024-06-12

## 2024-05-16 RX ORDER — ALBUTEROL SULFATE 0.83 MG/ML
2.5 SOLUTION RESPIRATORY (INHALATION)
Status: CANCELLED | OUTPATIENT
Start: 2024-06-12

## 2024-05-16 RX ORDER — DIPHENHYDRAMINE HYDROCHLORIDE 50 MG/ML
50 INJECTION INTRAMUSCULAR; INTRAVENOUS
Status: CANCELLED
Start: 2024-06-12

## 2024-05-16 RX ADMIN — LIDOCAINE HYDROCHLORIDE 2 ML: 10 INJECTION, SOLUTION EPIDURAL; INFILTRATION; INTRACAUDAL; PERINEURAL at 15:38

## 2024-05-16 RX ADMIN — BUPRENORPHINE 100 MG: 100 SOLUTION SUBCUTANEOUS at 15:46

## 2024-05-16 ASSESSMENT — PAIN SCALES - GENERAL: PAINLEVEL: MODERATE PAIN (5)

## 2024-05-16 NOTE — PROGRESS NOTES
Infusion Nursing Note:  Giovani VICENTE Damian presents today for sublocade 100 mg injection.    Patient seen by provider today: No   present during visit today: Not Applicable.    Note: N/A.      Intravenous Access:  No Intravenous access/labs at this visit.    Treatment Conditions:  Denies relapse.  No S/S infection or tampering at previous injection sites.      Post Infusion Assessment:  Patient tolerated injection without incident.  Given in RLQ after 2ml subcutaneous xylocaine administered for local anesthesia.       Discharge Plan:   Patient discharged in stable condition accompanied by: self.  Departure Mode: Ambulatory.  RTC 6/13/24.      Yanelis Seay

## 2024-05-22 LAB
MISCELLANEOUS TEST 1 (ARUP): ABNORMAL
SCANNED LAB RESULT: ABNORMAL
TEST NAME: ABNORMAL

## 2024-06-13 ENCOUNTER — INFUSION THERAPY VISIT (OUTPATIENT)
Dept: INFUSION THERAPY | Facility: CLINIC | Age: 44
End: 2024-06-13
Attending: FAMILY MEDICINE
Payer: COMMERCIAL

## 2024-06-13 VITALS — SYSTOLIC BLOOD PRESSURE: 113 MMHG | HEART RATE: 65 BPM | TEMPERATURE: 98.3 F | DIASTOLIC BLOOD PRESSURE: 73 MMHG

## 2024-06-13 DIAGNOSIS — F11.20 OPIOID USE DISORDER, SEVERE, DEPENDENCE (H): Primary | ICD-10-CM

## 2024-06-13 PROCEDURE — 250N000011 HC RX IP 250 OP 636: Mod: JZ | Performed by: FAMILY MEDICINE

## 2024-06-13 PROCEDURE — 96372 THER/PROPH/DIAG INJ SC/IM: CPT | Performed by: FAMILY MEDICINE

## 2024-06-13 PROCEDURE — 250N000009 HC RX 250: Performed by: FAMILY MEDICINE

## 2024-06-13 RX ORDER — MEPERIDINE HYDROCHLORIDE 25 MG/ML
25 INJECTION INTRAMUSCULAR; INTRAVENOUS; SUBCUTANEOUS EVERY 30 MIN PRN
Status: CANCELLED | OUTPATIENT
Start: 2024-07-11

## 2024-06-13 RX ORDER — LIDOCAINE HYDROCHLORIDE 10 MG/ML
2 INJECTION, SOLUTION EPIDURAL; INFILTRATION; INTRACAUDAL; PERINEURAL ONCE
Status: COMPLETED | OUTPATIENT
Start: 2024-06-13 | End: 2024-06-13

## 2024-06-13 RX ORDER — ALBUTEROL SULFATE 0.83 MG/ML
2.5 SOLUTION RESPIRATORY (INHALATION)
Status: CANCELLED | OUTPATIENT
Start: 2024-07-11

## 2024-06-13 RX ORDER — LIDOCAINE HYDROCHLORIDE 10 MG/ML
2 INJECTION, SOLUTION EPIDURAL; INFILTRATION; INTRACAUDAL; PERINEURAL ONCE
Status: CANCELLED | OUTPATIENT
Start: 2024-07-11 | End: 2024-07-11

## 2024-06-13 RX ORDER — ALBUTEROL SULFATE 90 UG/1
1-2 AEROSOL, METERED RESPIRATORY (INHALATION)
Status: CANCELLED
Start: 2024-07-11

## 2024-06-13 RX ORDER — METHYLPREDNISOLONE SODIUM SUCCINATE 125 MG/2ML
125 INJECTION, POWDER, LYOPHILIZED, FOR SOLUTION INTRAMUSCULAR; INTRAVENOUS
Status: CANCELLED
Start: 2024-07-11

## 2024-06-13 RX ORDER — DIPHENHYDRAMINE HYDROCHLORIDE 50 MG/ML
50 INJECTION INTRAMUSCULAR; INTRAVENOUS
Status: CANCELLED
Start: 2024-07-11

## 2024-06-13 RX ORDER — EPINEPHRINE 1 MG/ML
0.3 INJECTION, SOLUTION, CONCENTRATE INTRAVENOUS EVERY 5 MIN PRN
Status: CANCELLED | OUTPATIENT
Start: 2024-07-11

## 2024-06-13 RX ADMIN — LIDOCAINE HYDROCHLORIDE 2 ML: 10 INJECTION, SOLUTION EPIDURAL; INFILTRATION; INTRACAUDAL; PERINEURAL at 15:43

## 2024-06-13 RX ADMIN — BUPRENORPHINE 100 MG: 100 SOLUTION SUBCUTANEOUS at 15:50

## 2024-06-13 ASSESSMENT — PAIN SCALES - GENERAL: PAINLEVEL: NO PAIN (0)

## 2024-06-13 NOTE — PROGRESS NOTES
Infusion Nursing Note:  Giovani VICENTE Damian presents today for sublocade 200 mg injection.    Patient seen by provider today: No   present during visit today: Not Applicable.    Note: Reminded to see his provider.      Intravenous Access:  No Intravenous access/labs at this visit.    Treatment Conditions:  Denies relapse.  No S/S infection or tampering at previous injection sites.      Post Infusion Assessment:  Patient tolerated injection without incident.  Given in LLQ after 2ml subcutaneous xylocaine administered for local anesthesia.       Discharge Plan:   Patient discharged in stable condition accompanied by: self.  Departure Mode: Ambulatory.  RTC 7/11/24.      Yanelis Seay

## 2024-07-06 ENCOUNTER — HEALTH MAINTENANCE LETTER (OUTPATIENT)
Age: 44
End: 2024-07-06

## 2024-07-18 ENCOUNTER — INFUSION THERAPY VISIT (OUTPATIENT)
Dept: INFUSION THERAPY | Facility: CLINIC | Age: 44
End: 2024-07-18
Attending: FAMILY MEDICINE
Payer: COMMERCIAL

## 2024-07-18 VITALS
DIASTOLIC BLOOD PRESSURE: 82 MMHG | OXYGEN SATURATION: 96 % | HEART RATE: 74 BPM | SYSTOLIC BLOOD PRESSURE: 125 MMHG | TEMPERATURE: 98.4 F | RESPIRATION RATE: 16 BRPM

## 2024-07-18 DIAGNOSIS — F11.20 OPIOID USE DISORDER, SEVERE, DEPENDENCE (H): Primary | ICD-10-CM

## 2024-07-18 PROCEDURE — 250N000011 HC RX IP 250 OP 636: Performed by: FAMILY MEDICINE

## 2024-07-18 PROCEDURE — 96372 THER/PROPH/DIAG INJ SC/IM: CPT | Performed by: FAMILY MEDICINE

## 2024-07-18 PROCEDURE — 250N000009 HC RX 250: Performed by: FAMILY MEDICINE

## 2024-07-18 RX ORDER — LIDOCAINE HYDROCHLORIDE 10 MG/ML
2 INJECTION, SOLUTION EPIDURAL; INFILTRATION; INTRACAUDAL; PERINEURAL ONCE
Status: COMPLETED | OUTPATIENT
Start: 2024-07-18 | End: 2024-07-18

## 2024-07-18 RX ORDER — EPINEPHRINE 1 MG/ML
0.3 INJECTION, SOLUTION, CONCENTRATE INTRAVENOUS EVERY 5 MIN PRN
Status: CANCELLED | OUTPATIENT
Start: 2024-08-08

## 2024-07-18 RX ORDER — LIDOCAINE HYDROCHLORIDE 10 MG/ML
2 INJECTION, SOLUTION EPIDURAL; INFILTRATION; INTRACAUDAL; PERINEURAL ONCE
Status: CANCELLED | OUTPATIENT
Start: 2024-08-08 | End: 2024-08-08

## 2024-07-18 RX ORDER — DIPHENHYDRAMINE HYDROCHLORIDE 50 MG/ML
50 INJECTION INTRAMUSCULAR; INTRAVENOUS
Status: CANCELLED
Start: 2024-08-08

## 2024-07-18 RX ORDER — ALBUTEROL SULFATE 0.83 MG/ML
2.5 SOLUTION RESPIRATORY (INHALATION)
Status: CANCELLED | OUTPATIENT
Start: 2024-08-08

## 2024-07-18 RX ORDER — ALBUTEROL SULFATE 90 UG/1
1-2 AEROSOL, METERED RESPIRATORY (INHALATION)
Status: CANCELLED
Start: 2024-08-08

## 2024-07-18 RX ORDER — METHYLPREDNISOLONE SODIUM SUCCINATE 125 MG/2ML
125 INJECTION, POWDER, LYOPHILIZED, FOR SOLUTION INTRAMUSCULAR; INTRAVENOUS
Status: CANCELLED
Start: 2024-08-08

## 2024-07-18 RX ORDER — MEPERIDINE HYDROCHLORIDE 25 MG/ML
25 INJECTION INTRAMUSCULAR; INTRAVENOUS; SUBCUTANEOUS EVERY 30 MIN PRN
Status: CANCELLED | OUTPATIENT
Start: 2024-08-08

## 2024-07-18 RX ADMIN — LIDOCAINE HYDROCHLORIDE 2 ML: 10 INJECTION, SOLUTION EPIDURAL; INFILTRATION; INTRACAUDAL; PERINEURAL at 15:46

## 2024-07-18 RX ADMIN — BUPRENORPHINE 100 MG: 100 SOLUTION SUBCUTANEOUS at 15:49

## 2024-07-18 ASSESSMENT — PAIN SCALES - GENERAL: PAINLEVEL: NO PAIN (0)

## 2024-07-18 NOTE — PROGRESS NOTES
Infusion Nursing Note:  Giovani ZHANG Damian presents today for subocade 100mg.    Patient seen by provider today: No   present during visit today: Not Applicable.    Note: Patient denies relapse or withdrawal/cravings.  No evidence of tampering noted.      Intravenous Access:  No Intravenous access/labs at this visit.    Treatment Conditions:  Not Applicable.      Post Infusion Assessment:  Patient tolerated injection without incident.  Lidocaine given prior to sublocade.  Sublocade injection given in the same trajectory without difficulty.  RLQ      Discharge Plan:   Patient discharged in stable condition accompanied by: self.  Departure Mode: Ambulatory.  Will return to clinic in 4 week, also mentioned that patient will need to be seen prior to his next injection.    Lucie Trujillo RN

## 2024-08-12 ENCOUNTER — TELEPHONE (OUTPATIENT)
Dept: BEHAVIORAL HEALTH | Facility: CLINIC | Age: 44
End: 2024-08-12
Payer: COMMERCIAL

## 2024-08-12 NOTE — TELEPHONE ENCOUNTER
Per Dr. Fay, patient needs to be seen in the Recovery Clinic before Sublocade injection 8/15/24. Patient last seen in the Recovery Clinic April 2024. LVM informing patient.    [facility]  [facility], [address]   [place], [place], [address]  Phone: [phone]  Fax: [phone]    Open Monday-Friday  Closed over lunch hour  Walk in hours: 9am-11:30am and 12:30-3pm    Mabel Valle RN on 8/12/2024 at 11:13 AM

## 2024-08-16 ENCOUNTER — OFFICE VISIT (OUTPATIENT)
Dept: BEHAVIORAL HEALTH | Facility: CLINIC | Age: 44
End: 2024-08-16
Payer: COMMERCIAL

## 2024-08-16 ENCOUNTER — INFUSION THERAPY VISIT (OUTPATIENT)
Dept: INFUSION THERAPY | Facility: CLINIC | Age: 44
End: 2024-08-16
Attending: FAMILY MEDICINE
Payer: COMMERCIAL

## 2024-08-16 ENCOUNTER — LAB (OUTPATIENT)
Dept: LAB | Facility: CLINIC | Age: 44
End: 2024-08-16
Attending: FAMILY MEDICINE
Payer: COMMERCIAL

## 2024-08-16 VITALS — SYSTOLIC BLOOD PRESSURE: 136 MMHG | OXYGEN SATURATION: 99 % | DIASTOLIC BLOOD PRESSURE: 87 MMHG | HEART RATE: 87 BPM

## 2024-08-16 DIAGNOSIS — F10.90 ALCOHOL USE DISORDER: ICD-10-CM

## 2024-08-16 DIAGNOSIS — Z79.891 ENCOUNTER FOR MONITORING OPIOID MAINTENANCE THERAPY: ICD-10-CM

## 2024-08-16 DIAGNOSIS — T40.2X5A THERAPEUTIC OPIOID-INDUCED CONSTIPATION (OIC): ICD-10-CM

## 2024-08-16 DIAGNOSIS — F32.A ANXIETY AND DEPRESSION: ICD-10-CM

## 2024-08-16 DIAGNOSIS — Z51.81 ENCOUNTER FOR MONITORING OPIOID MAINTENANCE THERAPY: ICD-10-CM

## 2024-08-16 DIAGNOSIS — Z79.899 CHRONIC PRESCRIPTION BENZODIAZEPINE USE: ICD-10-CM

## 2024-08-16 DIAGNOSIS — K59.03 THERAPEUTIC OPIOID-INDUCED CONSTIPATION (OIC): ICD-10-CM

## 2024-08-16 DIAGNOSIS — F41.9 ANXIETY AND DEPRESSION: ICD-10-CM

## 2024-08-16 DIAGNOSIS — F11.20 OPIOID USE DISORDER, SEVERE, DEPENDENCE (H): Primary | ICD-10-CM

## 2024-08-16 LAB
ALT SERPL W P-5'-P-CCNC: 29 U/L (ref 0–70)
AMPHETAMINE QUAL URINE POCT: NEGATIVE
AST SERPL W P-5'-P-CCNC: 17 U/L (ref 0–45)
BARBITURATE QUAL URINE POCT: NEGATIVE
BENZODIAZEPINE QUAL URINE POCT: NEGATIVE
BUPRENORPHINE QUAL URINE POCT: ABNORMAL
COCAINE QUAL URINE POCT: NEGATIVE
CREATININE QUAL URINE POCT: ABNORMAL
INTERNAL QC QUAL URINE POCT: ABNORMAL
MDMA QUAL URINE POCT: NEGATIVE
METHADONE QUAL URINE POCT: NEGATIVE
METHAMPHETAMINE QUAL URINE POCT: NEGATIVE
OPIATE QUAL URINE POCT: NEGATIVE
OXYCODONE QUAL URINE POCT: NEGATIVE
PH QUAL URINE POCT: ABNORMAL
PHENCYCLIDINE QUAL URINE POCT: NEGATIVE
POCT KIT EXPIRATION DATE: ABNORMAL
POCT KIT LOT NUMBER: ABNORMAL
SPECIFIC GRAVITY POCT: 1.02
TEMPERATURE URINE POCT: ABNORMAL
THC QUAL URINE POCT: NEGATIVE

## 2024-08-16 PROCEDURE — 36415 COLL VENOUS BLD VENIPUNCTURE: CPT

## 2024-08-16 PROCEDURE — 96372 THER/PROPH/DIAG INJ SC/IM: CPT | Performed by: FAMILY MEDICINE

## 2024-08-16 PROCEDURE — G2211 COMPLEX E/M VISIT ADD ON: HCPCS | Performed by: FAMILY MEDICINE

## 2024-08-16 PROCEDURE — 80305 DRUG TEST PRSMV DIR OPT OBS: CPT | Performed by: FAMILY MEDICINE

## 2024-08-16 PROCEDURE — 250N000009 HC RX 250: Performed by: FAMILY MEDICINE

## 2024-08-16 PROCEDURE — 250N000011 HC RX IP 250 OP 636: Performed by: FAMILY MEDICINE

## 2024-08-16 PROCEDURE — 84450 TRANSFERASE (AST) (SGOT): CPT

## 2024-08-16 PROCEDURE — 84460 ALANINE AMINO (ALT) (SGPT): CPT

## 2024-08-16 PROCEDURE — 99214 OFFICE O/P EST MOD 30 MIN: CPT | Performed by: FAMILY MEDICINE

## 2024-08-16 RX ORDER — EPINEPHRINE 1 MG/ML
0.3 INJECTION, SOLUTION, CONCENTRATE INTRAVENOUS EVERY 5 MIN PRN
Status: CANCELLED | OUTPATIENT
Start: 2024-09-12

## 2024-08-16 RX ORDER — ALBUTEROL SULFATE 90 UG/1
1-2 AEROSOL, METERED RESPIRATORY (INHALATION)
Status: CANCELLED
Start: 2024-09-12

## 2024-08-16 RX ORDER — DIPHENHYDRAMINE HYDROCHLORIDE 50 MG/ML
50 INJECTION INTRAMUSCULAR; INTRAVENOUS
Status: CANCELLED
Start: 2024-09-12

## 2024-08-16 RX ORDER — MEPERIDINE HYDROCHLORIDE 25 MG/ML
25 INJECTION INTRAMUSCULAR; INTRAVENOUS; SUBCUTANEOUS EVERY 30 MIN PRN
Status: CANCELLED | OUTPATIENT
Start: 2024-09-12

## 2024-08-16 RX ORDER — METHYLPREDNISOLONE SODIUM SUCCINATE 125 MG/2ML
125 INJECTION, POWDER, LYOPHILIZED, FOR SOLUTION INTRAMUSCULAR; INTRAVENOUS
Status: CANCELLED
Start: 2024-09-12

## 2024-08-16 RX ORDER — LIDOCAINE HYDROCHLORIDE 10 MG/ML
2 INJECTION, SOLUTION EPIDURAL; INFILTRATION; INTRACAUDAL; PERINEURAL ONCE
Status: COMPLETED | OUTPATIENT
Start: 2024-08-16 | End: 2024-08-16

## 2024-08-16 RX ORDER — ALBUTEROL SULFATE 0.83 MG/ML
2.5 SOLUTION RESPIRATORY (INHALATION)
Status: CANCELLED | OUTPATIENT
Start: 2024-09-12

## 2024-08-16 RX ORDER — GABAPENTIN 300 MG/1
300 CAPSULE ORAL 3 TIMES DAILY
COMMUNITY

## 2024-08-16 RX ORDER — LIDOCAINE HYDROCHLORIDE 10 MG/ML
2 INJECTION, SOLUTION EPIDURAL; INFILTRATION; INTRACAUDAL; PERINEURAL ONCE
Status: CANCELLED | OUTPATIENT
Start: 2024-09-12 | End: 2024-09-12

## 2024-08-16 RX ADMIN — BUPRENORPHINE 100 MG: 100 SOLUTION SUBCUTANEOUS at 15:51

## 2024-08-16 RX ADMIN — LIDOCAINE HYDROCHLORIDE 2 ML: 10 INJECTION, SOLUTION EPIDURAL; INFILTRATION; INTRACAUDAL; PERINEURAL at 15:48

## 2024-08-16 ASSESSMENT — PAIN SCALES - GENERAL: PAINLEVEL: NO PAIN (0)

## 2024-08-16 ASSESSMENT — PATIENT HEALTH QUESTIONNAIRE - PHQ9: SUM OF ALL RESPONSES TO PHQ QUESTIONS 1-9: 1

## 2024-08-16 NOTE — PROGRESS NOTES
[facility] Follow Up    ASSESSMENT/PLAN                                                      1. Opioid use disorder, severe, dependence (H)  Stable.  Continue Sublocade 100mg q month. Has Narcan.  Encourage continuing recovery activities.      2. Therapeutic opioid-induced constipation (OIC)  Stable.      3. Encounter for monitoring opioid maintenance therapy  - Drugs of Abuse Screen Urine (POC CUPS) POCT  - ALT; Future  - AST; Future    4. Alcohol use disorder  Stable, no cravings.  Encouraged continued recovery activities.      5. Anxiety and depression  6. Chronic prescription benzodiazepine use  Reporting mood is stable.  Reviewed risk of combining benzodiazepines and buprenorphine.           Return in about 4 weeks (around 9/13/2024) for Follow up, using a video visit - [facility] [place] [facility].      Patient counseling completed today:  Discussed mechanism of action, potential risks/benefits/side effects of medications and other recommendations above.     Discussed risk of precipitated withdrawal with initiation of buprenorphine in the presence of full opioid agonists.    Reviewed directions for initiation of buprenorphine to reduce risk of precipitated withdrawal and maximize efficacy.    Harm reduction counseling including never use alone, availability of naloxone, risks associated with concurrent use of opioids and benzodiazepines, alcohol, or other sedatives, safer administration as applicable.  Discussed importance of avoiding isolation, building a network of supportive relationships, avoiding people/places/things associated with past use to reduce risk of relapse; including motivational interviewing regarding psychosocial interventions.   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       Brief History:  Giovani Damian was first seen in Recovery Clinic on 03/07/24. They were referred by [facility] ED after pt presented there on 1/5/24 c/o withdrawal symptoms from kratom.   After multiple subsequent ED visits for alcohol intoxication and withdrawal, pt traveled to [place], [place] for detox.  He reports he stayed at this facility for 30 days for residential treatment.  He was treated for alcohol withdrawal and started on buprenorphine while in [place].  Discharged on buprenorphine 6mg/day (2mg tid.)  His dose of buprenorphine was increased to Suboxone 8-2mg daily at his initial  visit due to his desire to transition to Sublocade.      Sublocade dosing:  3/21/24 300mg  4/18/24 300mg  5/16/24 100mg  6/13/24 100mg  7/18/24 100mg      Recommendations last visit: 4/18/24  1. Opioid use disorder, severe, dependence (H)  Reporting control of symptoms with sublocade. He received #2 injection today (300 mg). He has not needed any additional SL buprenorphine since starting sublocade.  Continue monthly sublocade, will be starting 100 mg next month  Continue regular meeting attendance  Confirms narcan access.      2. Alcohol use disorder  Denies cravings or return to use. Is going to several Aa meetings every week which he finds is a good support for him.     3. Chronic prescription benzodiazepine use  Is prescribed clonazepam 0.5 mg by psychiatry, aware of risks of multiple CNS depressants.      4. Encounter for monitoring opioid maintenance therapy  - Drugs of Abuse Screen Urine (POC CUPS) POCT  - AST; Future  - ALT; Future     5. Therapeutic opioid-induced constipation (OIC)  Reports ongoing symptoms not relieved with miralax, has been taking it every other day. Encouraged to increase use to BID, and adding senna and suppositories.   - SENNA-docusate sodium (SENNA S) 8.6-50 MG tablet; Take 1 tablet by mouth at bedtime  Dispense: 30 tablet; Refill: 1  - bisacodyl  "(DULCOLAX) 10 MG suppository; Place 1 suppository (10 mg) rectally daily as needed for constipation  Dispense: 5 suppository; Refill: 0       08/16/24 HPI:  No injection site issues  No significant side effects - taking senna as needed  No kratom cravings  No alcohol cravings, no use  Going to AA meetings, multiple nights per week, lots of sober support  Mood is \"good\" - no med changes  Sleeping well  Appetite is stable  Taking clonazepam a few times per week    Substance Use History :  Opioids:   Age at first use: 41, use increased age 42  Current use: substance: Kratom ; quantity tincture shots, 6-8/day; route: oral liquid ; timing of last use: 1/28/2024                IV drug use: No   History of overdose: No  Previous residential or outpatient treatments for addiction : Yes: has been to 5 inpatient treatments - also for alcohol abuse.  Previous medication treatments for addiction: Yes: Suboxone   Longest period of sobriety: 5 years for alcohol. Only a couple days from Kratom   Medical complications related to substance use: elevated liver enzymes, low testosterone   Hepatitis C: 8/30/23 HCV ab nonreactive  HIV: 8/30/23 HIV ag/ab nonreactive        Other Addiction History:  Stimulants   Cocaine in 20's. No use in 20+ years  Sedatives/hypnotics/anxiolytics:   Prescribed as needed from psychiatry   Alcohol:   Hx of dependence.  Began at age 15. Binge drinking most recently   Nicotine:   vape  Cannabis:   One time in the last couple years. A lot when younger  Hallucinogens/Dissociatives:   None since high school  Eating disorder:  None   Gambling:              None        PAST PSYCHIATRIC HISTORY:  Diagnoses- Panic disorder and depression  Suicide Attempts: No   Hospitalizations: No         3/21/2024    10:00 AM 4/18/2024     2:00 PM 8/16/2024     2:00 PM   PHQ   PHQ-9 Total Score 4 2 1   Q9: Thoughts of better off dead/self-harm past 2 weeks Not at all Not at all Not at all     Social History  Housing status:  Own " [facility]  Employment status: Employed full time -   Relationship status:   Children: 3 children  Legal: on probation       Labs discussed with patient?  Yes      [place] Prescription Drug Monitoring Program Reviewed:  Yes    Medications:  Current Outpatient Medications   Medication Sig Dispense Refill    clonazePAM (KLONOPIN) 0.5 MG tablet Take 1 tablet (0.5 mg) by mouth daily as needed for anxiety      cloNIDine (CATAPRES) 0.1 MG tablet Take 0.1 mg by mouth 2 times daily      gabapentin (NEURONTIN) 300 MG capsule Take 300 mg by mouth 3 times daily      hydrOXYzine HCl (ATARAX) 25 MG tablet Take 25 mg by mouth 2 times daily as needed for anxiety      lisinopril (ZESTRIL) 10 MG tablet Take 20 mg by mouth daily      mirtazapine (REMERON) 30 MG tablet Take 30 mg by mouth At Bedtime      propranolol (INDERAL) 20 MG tablet Take 20 mg by mouth 2 times daily States he takes this PRN      SENNA-docusate sodium (SENNA S) 8.6-50 MG tablet Take 1 tablet by mouth at bedtime 30 tablet 1    naloxone (NARCAN) 4 MG/0.1ML nasal spray Spray 1 spray (4 mg) into one nostril alternating nostrils as needed for opioid reversal every 2-3 minutes until assistance arrives (Patient not taking: Reported on 3/21/2024) 0.2 mL 11     No current facility-administered medications for this visit.       No Known Allergies    PMH, PSH, FamHx reviewed      OBJECTIVE                                                      /87   Pulse 87   SpO2 99%     Physical Exam  Vitals and nursing note reviewed.   Constitutional:       General: He is not in acute distress.     Appearance: Normal appearance. He is not ill-appearing or diaphoretic.   Cardiovascular:      Rate and Rhythm: Normal rate.   Pulmonary:      Effort: Pulmonary effort is normal. No respiratory distress.   Skin:     Coloration: Skin is not jaundiced or pale.   Neurological:      General: No focal deficit present.      Mental Status: He is alert and oriented  to person, place, and time.      Gait: Gait normal.   Psychiatric:         Mood and Affect: Mood normal.         Behavior: Behavior normal.         Thought Content: Thought content normal.         Judgment: Judgment normal.         Labs:    UDS:    Lab Results   Component Value Date    BUP Screen Positive (A) 08/16/2024    BZO Negative 08/16/2024    BAR Negative 08/16/2024    SURI Negative 08/16/2024    MAMP Negative 08/16/2024    AMP Negative 08/16/2024    MDMA Negative 08/16/2024    MTD Negative 08/16/2024    COM643 Negative 08/16/2024    OXY Negative 08/16/2024    PCP Negative 08/16/2024    THC Negative 08/16/2024    TEMP 90 F 08/16/2024    SGPOCT 1.025 08/16/2024     *POC urine drug screen does not screen for Fentanyl      No results found for this or any previous visit (from the past 240 hour(s)).      Adia Fay DO  [place] [address] [place] [address] [id number] [phone]

## 2024-08-16 NOTE — NURSING NOTE
[facility]      Rooming information:    Approximate last use of full opioid agonist: 1/29/2024  Taking buprenorphine? Yes: Sublocade  How much per day? No additional oral   Number of buprenorphine films/tablets remaining currently: 0  Side effects related to buprenorphine (constipation, dry mouth, sedation?) No   Narcan currently available: Yes  Other recent substance use:    Denies  NICOTINE-Yes: Vape  If using nicotine, ready to quit? No    Point of care urine drug screen positive for:  Lab Results   Component Value Date    BUP Screen Positive (A) 04/18/2024    BZO Negative 04/18/2024    BAR Negative 04/18/2024    SURI Negative 04/18/2024    MAMP Negative 04/18/2024    AMP Negative 04/18/2024    MDMA Negative 04/18/2024    MTD Negative 04/18/2024    FOZ825 Negative 04/18/2024    OXY Negative 04/18/2024    PCP Negative 04/18/2024    THC Negative 04/18/2024    TEMP 92 F 04/18/2024    SGPOCT 1.025 04/18/2024       *POC urine drug screen does not screen for Fentanyl    Depression Response    Patient completed the PHQ-9 assessment for depression and scored >9? No  Question 9 on the PHQ-9 was positive for suicidality? No  Does patient have current mental health provider? Yes. [facility]  C-SSRS screener risk level.             8/16/2024     2:00 PM   PHQ Assesment Total Score(s)   PHQ-9 Score 1         Housing needs: Stable     Insurance: Active    Current legal issues: None    Contact information up to date? Yes    3rd Party Involvement: None today (please obtain HANANE if pt would like to include)    Leeanna Fajardo RN  August 16, 2024  2:54 PM

## 2024-08-16 NOTE — PROGRESS NOTES
Infusion Nursing Note:  Giovani ZHANG Damian presents today for sublocade injection.    Patient seen by provider today: Yes: Dr. Fay   present during visit today: Not Applicable.    Note: Patient states doing well.      Intravenous Access:  No Intravenous access/labs at this visit.    Treatment Conditions:  Patient denies relapse within the last 7 days. Patient's previous injection site is free of signs and symptoms of infection and tampering.      Post Infusion Assessment:  Patient tolerated injection without incident.  No evidence of extravasations.       Discharge Plan:   Discharge instructions reviewed with: Patient.  Patient and/or family verbalized understanding of discharge instructions and all questions answered.  Patient discharged in stable condition accompanied by: self.  Departure Mode: Ambulatory.      Maddi Casillas RN

## 2024-09-13 ENCOUNTER — VIRTUAL VISIT (OUTPATIENT)
Dept: ADDICTION MEDICINE | Facility: CLINIC | Age: 44
End: 2024-09-13
Payer: COMMERCIAL

## 2024-09-13 ENCOUNTER — INFUSION THERAPY VISIT (OUTPATIENT)
Dept: INFUSION THERAPY | Facility: CLINIC | Age: 44
End: 2024-09-13
Attending: FAMILY MEDICINE
Payer: COMMERCIAL

## 2024-09-13 VITALS — DIASTOLIC BLOOD PRESSURE: 90 MMHG | OXYGEN SATURATION: 97 % | SYSTOLIC BLOOD PRESSURE: 138 MMHG | HEART RATE: 88 BPM

## 2024-09-13 DIAGNOSIS — F11.20 OPIOID USE DISORDER, SEVERE, DEPENDENCE (H): Primary | ICD-10-CM

## 2024-09-13 DIAGNOSIS — Z79.899 CHRONIC PRESCRIPTION BENZODIAZEPINE USE: ICD-10-CM

## 2024-09-13 DIAGNOSIS — K59.03 THERAPEUTIC OPIOID-INDUCED CONSTIPATION (OIC): ICD-10-CM

## 2024-09-13 DIAGNOSIS — F32.A ANXIETY AND DEPRESSION: ICD-10-CM

## 2024-09-13 DIAGNOSIS — T40.2X5A THERAPEUTIC OPIOID-INDUCED CONSTIPATION (OIC): ICD-10-CM

## 2024-09-13 DIAGNOSIS — F10.90 ALCOHOL USE DISORDER: ICD-10-CM

## 2024-09-13 DIAGNOSIS — F41.9 ANXIETY AND DEPRESSION: ICD-10-CM

## 2024-09-13 PROCEDURE — 96372 THER/PROPH/DIAG INJ SC/IM: CPT | Performed by: FAMILY MEDICINE

## 2024-09-13 PROCEDURE — 250N000011 HC RX IP 250 OP 636: Performed by: FAMILY MEDICINE

## 2024-09-13 PROCEDURE — 250N000009 HC RX 250: Performed by: FAMILY MEDICINE

## 2024-09-13 PROCEDURE — 99214 OFFICE O/P EST MOD 30 MIN: CPT | Mod: 95 | Performed by: NURSE PRACTITIONER

## 2024-09-13 RX ORDER — LUBIPROSTONE 24 UG/1
24 CAPSULE ORAL 2 TIMES DAILY WITH MEALS
Qty: 60 CAPSULE | Refills: 2 | Status: SHIPPED | OUTPATIENT
Start: 2024-09-13 | End: 2024-09-17

## 2024-09-13 RX ORDER — LIDOCAINE HYDROCHLORIDE 10 MG/ML
2 INJECTION, SOLUTION EPIDURAL; INFILTRATION; INTRACAUDAL; PERINEURAL ONCE
OUTPATIENT
Start: 2024-10-11 | End: 2024-10-11

## 2024-09-13 RX ORDER — LIDOCAINE HYDROCHLORIDE 10 MG/ML
2 INJECTION, SOLUTION EPIDURAL; INFILTRATION; INTRACAUDAL; PERINEURAL ONCE
Status: COMPLETED | OUTPATIENT
Start: 2024-09-13 | End: 2024-09-13

## 2024-09-13 RX ORDER — ALBUTEROL SULFATE 0.83 MG/ML
2.5 SOLUTION RESPIRATORY (INHALATION)
OUTPATIENT
Start: 2024-10-11

## 2024-09-13 RX ORDER — EPINEPHRINE 1 MG/ML
0.3 INJECTION, SOLUTION, CONCENTRATE INTRAVENOUS EVERY 5 MIN PRN
OUTPATIENT
Start: 2024-10-11

## 2024-09-13 RX ORDER — METHYLPREDNISOLONE SODIUM SUCCINATE 125 MG/2ML
125 INJECTION, POWDER, LYOPHILIZED, FOR SOLUTION INTRAMUSCULAR; INTRAVENOUS
Start: 2024-10-11

## 2024-09-13 RX ORDER — ALBUTEROL SULFATE 90 UG/1
1-2 AEROSOL, METERED RESPIRATORY (INHALATION)
Start: 2024-10-11

## 2024-09-13 RX ORDER — MEPERIDINE HYDROCHLORIDE 25 MG/ML
25 INJECTION INTRAMUSCULAR; INTRAVENOUS; SUBCUTANEOUS EVERY 30 MIN PRN
OUTPATIENT
Start: 2024-10-11

## 2024-09-13 RX ORDER — DIPHENHYDRAMINE HYDROCHLORIDE 50 MG/ML
50 INJECTION INTRAMUSCULAR; INTRAVENOUS
Start: 2024-10-11

## 2024-09-13 RX ADMIN — LIDOCAINE HYDROCHLORIDE 2 ML: 10 INJECTION, SOLUTION EPIDURAL; INFILTRATION; INTRACAUDAL; PERINEURAL at 14:37

## 2024-09-13 RX ADMIN — BUPRENORPHINE 100 MG: 100 SOLUTION SUBCUTANEOUS at 14:39

## 2024-09-13 NOTE — NURSING NOTE
Is the patient currently in the state of [place]? YES    Current patient location:  In [place] but not home    Visit mode:VIDEO    If the visit is dropped, the patient can be reconnected by: VIDEO VISIT: Text to cell phone:   Telephone Information:   Mobile [phone]       Will anyone else be joining the visit? No  (If patient encounters technical issues they should call [phone])    How would you like to obtain your AVS? MyChart    Are changes needed to the allergy or medication list? No    Rooming Documentation: Questionnaire(s) completed.    Reason for visit: MAIKEL Maria

## 2024-09-13 NOTE — PROGRESS NOTES
Virtual Visit Details    Type of service:  Video Visit   Start 0840  End 0850  Originating Location (pt. Location): Other work    Distant Location (provider location):  Off-site  Platform used for Video Visit: [employer] [facility]    A/P                                                    ASSESSMENT/PLAN    1. Opioid use disorder, severe, dependence (H)  Stable with monthly sublocade 100 mg.   Confirms narcan  Continue recovery supports    2. Alcohol use disorder  Stable. Denies cravings or return to use.   Continue recovery supports    3. Therapeutic opioid-induced constipation (OIC)  Is struggling with ongoing constipation. He is using senna daily but having difficulty emptying, straining with Bms. He taking mag citrate monthly. Miralax ineffective.   Will start lubiprostone. Needs PA. Encouraged to trial dulcolax until PA approved.   - lubiprostone (AMITIZA) 24 MCG capsule; Take 1 capsule (24 mcg) by mouth 2 times daily (with meals).  Dispense: 60 capsule; Refill: 2    4. Anxiety and depression  5. Chronic prescription benzodiazepine use  Overall stable. Is taking clonazepam, gabapentin, clonidine, mirtazapine prescribed by psych. Reviewed risk of multiple CNS depressants with buprenorphine.     Orders Placed This Encounter   Medications    lubiprostone (AMITIZA) 24 MCG capsule     Sig: Take 1 capsule (24 mcg) by mouth 2 times daily (with meals).     Dispense:  60 capsule     Refill:  2       Problem list updated Sep 13, 2024   No problems updated.          PDMP Review         Value Time User    State PDMP site checked  Yes 9/13/2024  7:21 AM Sharron Martinez CNP          09/06/2024 09/06/2024 2 Clonazepam 0.5 Mg Tablet 30.00 15 Mi Romario [id number] [facility] ([id number]) 0/3 2.00 L[place]   08/12/2024 08/12/2024 2 Clonazepam 0.5 Mg Tablet 30.00 15 Mi Romario [id number] [facility] ([id number]) 0/0 2.00 [place]   07/16/2024 07/16/2024 2 Clonazepam 0.5 Mg Tablet 30.00 15 [place] [id number] [facility] ([id number]) 0/0 2.00  [place]   06/28/2024 04/04/2024 2 Gabapentin 300 Mg Capsule 270.00 90 [place] [id number] [facility] (4938) 1/1  [place]   06/18/2024 06/18/2024 2 Clonazepam 0.5 Mg Tablet 30.00 15 Mi Romario [id number] [facility] ([id number]) 0/0 2.00 [place]   05/21/2024 12/28/2023 2 Clonazepam 0.5 Mg Tablet 30.00 30 Mi Romario [id number] [facility] ([id number]) 0/0 1.00 [place]   04/21/2024 12/28/2023 2 Clonazepam 0.5 Mg Tablet 30.00 30 Mi Romario             RTC  Return in about 3 months (around 12/13/2024) for Follow up, in person 1130.      Counseled the patient on the importance of having a recovery program in addition to medication to manage recovery.  Components include avoiding isolating, having willingness to change, avoiding triggers and managing cravings. Encouraged having some type of sober network and practicing honesty with trusted support person(s). Encouraged other services such as counseling, 12 step or other self-help organizations.      Opioid warning reviewed.  Risk of overdose following a period of abstinence due to decrease tolerance was discussed including risk of death.  Strongly recommended abstain from alcohol, benzodiazepines, THC, opioids and other drugs of abuse.  Increased risk of return to opioid use after use of these substances discussed.  Increased risk of overdose/death with use of other substances particularly benzodiazepines/alcohol reviewed.        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        Brief History:  Giovani Damian was first seen in Recovery Clinic on 03/07/24. They were referred by [facility] ED after pt presented there on 1/5/24 c/o withdrawal symptoms from kratom.   After multiple subsequent ED visits for alcohol intoxication and withdrawal, pt traveled to [facility]  [place] for detox.  He reports he stayed at this facility for 30 days for residential treatment.  He was treated for alcohol withdrawal and started on buprenorphine while in [place].  Discharged on buprenorphine 6mg/day (2mg tid.)  His dose of buprenorphine was increased to Suboxone 8-2mg daily at his initial  visit due to his desire to transition to Sublocade.       Sublocade dosing:  3/21/24 300mg  4/18/24 300mg  5/16/24 100mg  6/13/24 100mg  7/18/24 100mg    Substance Use History :  Opioids:   Age at first use: 41, use increased age 42  Current use: substance: Kratom ; quantity tincture shots, 6-8/day; route: oral liquid ; timing of last use: 1/28/2024                IV drug use: No   History of overdose: No  Previous residential or outpatient treatments for addiction : Yes: has been to 5 inpatient treatments - also for alcohol abuse.  Previous medication treatments for addiction: Yes: Suboxone   Longest period of sobriety: 5 years for alcohol. Only a couple days from Kratom   Medical complications related to substance use: elevated liver enzymes, low testosterone   Hepatitis C: 8/30/23 HCV ab nonreactive  HIV: 8/30/23 HIV ag/ab nonreactive        Other Addiction History:  Stimulants   Cocaine in 20's. No use in 20+ years  Sedatives/hypnotics/anxiolytics:   Prescribed as needed from psychiatry   Alcohol:   Hx of dependence.  Began at age 15. Binge drinking most recently   Nicotine:   vape  Cannabis:   One time in the last couple years. A lot when younger  Hallucinogens/Dissociatives:   None since high school  Eating disorder:  None   Gambling:              None        PAST PSYCHIATRIC HISTORY:  Diagnoses- Panic disorder and depression  Suicide Attempts: No   Hospitalizations: No      Social History  Housing status:  [facility]  Employment status: Employed full time -   Relationship status:   Children: 3 children  Legal: on probation      Recent HPI Details:8/16/2024  1. Opioid use  "disorder, severe, dependence (H)  Stable.  Continue Sublocade 100mg q month. Has Narcan.  Encourage continuing recovery activities.       2. Therapeutic opioid-induced constipation (OIC)  Stable.       3. Encounter for monitoring opioid maintenance therapy  - Drugs of Abuse Screen Urine (POC CUPS) POCT  - ALT; Future  - AST; Future     4. Alcohol use disorder  Stable, no cravings.  Encouraged continued recovery activities.       5. Anxiety and depression  6. Chronic prescription benzodiazepine use  Reporting mood is stable.  Reviewed risk of combining benzodiazepines and buprenorphine.                        Return in about 4 weeks (around 9/13/2024) for Follow up, using a [place] visit [place] [facility] [place] [facility].       TODAY'S VISIT  HPI Sep 13, 2024    Is getting sublocade today  Denies cravings or withdrawal. Has not needed additional buprenorphine  Is constipated  Is taking clonazepam about twice weekly for anxiety \"panic attacks\"        3/21/2024    10:00 AM 4/18/2024     2:00 PM 8/16/2024     2:00 PM   PHQ   PHQ-9 Total Score 4 2 1   Q9: Thoughts of better off dead/self-harm past 2 weeks Not at all Not at all Not at all       OBJECTIVE                                                    PHYSICAL EXAM:  There were no vitals taken for this visit.      Physical Exam  Pulmonary:      Effort: Pulmonary effort is normal. No respiratory distress.   Neurological:      General: No focal deficit present.      Mental Status: He is alert and oriented to person, place, and time.   Psychiatric:         Attention and Perception: Attention normal.         Mood and Affect: Mood normal.         Speech: Speech normal.         Thought Content: Thought content normal. Thought content does not include suicidal ideation.         Judgment: Judgment normal.         LAB      HISTORY                                                    Problem list reviewed & adjusted, as indicated.  Patient Active Problem List   Diagnosis    Panic disorder    Pain of " right hand    Alcohol withdrawal syndrome without complication (H)    Anxiety    Opioid use disorder, severe, dependence (H)         MEDICATION LIST (prior to visit)  Current Outpatient Medications   Medication Sig Dispense Refill    lubiprostone (AMITIZA) 24 MCG capsule Take 1 capsule (24 mcg) by mouth 2 times daily (with meals). 60 capsule 2    clonazePAM (KLONOPIN) 0.5 MG tablet Take 1 tablet (0.5 mg) by mouth daily as needed for anxiety      cloNIDine (CATAPRES) 0.1 MG tablet Take 0.1 mg by mouth 2 times daily      gabapentin (NEURONTIN) 300 MG capsule Take 300 mg by mouth 3 times daily      hydrOXYzine HCl (ATARAX) 25 MG tablet Take 25 mg by mouth 2 times daily as needed for anxiety      lisinopril (ZESTRIL) 10 MG tablet Take 20 mg by mouth daily      mirtazapine (REMERON) 30 MG tablet Take 30 mg by mouth At Bedtime      naloxone (NARCAN) 4 MG/0.1ML nasal spray Spray 1 spray (4 mg) into one nostril alternating nostrils as needed for opioid reversal every 2-3 minutes until assistance arrives (Patient not taking: Reported on 3/21/2024) 0.2 mL 11    propranolol (INDERAL) 20 MG tablet Take 20 mg by mouth 2 times daily States he takes this PRN      SENNA-docusate sodium (SENNA S) 8.6-50 MG tablet Take 1 tablet by mouth at bedtime 30 tablet 1     No current facility-administered medications for this visit.       MEDICATION LIST (after visit)  Current Outpatient Medications   Medication Sig Dispense Refill    lubiprostone (AMITIZA) 24 MCG capsule Take 1 capsule (24 mcg) by mouth 2 times daily (with meals). 60 capsule 2    clonazePAM (KLONOPIN) 0.5 MG tablet Take 1 tablet (0.5 mg) by mouth daily as needed for anxiety      cloNIDine (CATAPRES) 0.1 MG tablet Take 0.1 mg by mouth 2 times daily      gabapentin (NEURONTIN) 300 MG capsule Take 300 mg by mouth 3 times daily      hydrOXYzine HCl (ATARAX) 25 MG tablet Take 25 mg by mouth 2 times daily as needed for anxiety      lisinopril (ZESTRIL) 10 MG tablet Take 20 mg by  mouth daily      mirtazapine (REMERON) 30 MG tablet Take 30 mg by mouth At Bedtime      naloxone (NARCAN) 4 MG/0.1ML nasal spray Spray 1 spray (4 mg) into one nostril alternating nostrils as needed for opioid reversal every 2-3 minutes until assistance arrives (Patient not taking: Reported on 3/21/2024) 0.2 mL 11    propranolol (INDERAL) 20 MG tablet Take 20 mg by mouth 2 times daily States he takes this PRN      SENNA-docusate sodium (SENNA S) 8.6-50 MG tablet Take 1 tablet by mouth at bedtime 30 tablet 1     No current facility-administered medications for this visit.         No Known Allergies     Continued Complex Management  The longitudinal plan of care for Opioid Use Disorder (OUD) and Alcohol Use Disorder (AUD) was addressed during this visit. Due to the added complexity in care, I will continue to support Giovani in the subsequent management and with ongoing continuity of care.          Sharron Martinez DNP,MSN, AGNP-C  [facility]   [address], [address]   [place] [address]   Phone # [phone]

## 2024-09-13 NOTE — PROGRESS NOTES
Infusion Nursing Note:  Giovani VICENTE HopeDamian presents today for sulocade 100mg.    Patient seen by provider today: Yes: Virtual visit   present during visit today: Not Applicable.    Note: Pt denies opioid use and withdrawal. No evidence of tampering. Lidocaine and sublocade given RLQ.      Intravenous Access:  No Intravenous access/labs at this visit.    Treatment Conditions:  Not Applicable.      Post Infusion Assessment:  Patient tolerated injection without incident.       Discharge Plan:   Discharge instructions reviewed with: Patient.  Patient and/or family verbalized understanding of discharge instructions and all questions answered.  Patient discharged in stable condition accompanied by: self.  Departure Mode: Ambulatory.      Ashley Ferrell RN

## 2024-09-15 DIAGNOSIS — T40.2X5A THERAPEUTIC OPIOID-INDUCED CONSTIPATION (OIC): ICD-10-CM

## 2024-09-15 DIAGNOSIS — K59.03 THERAPEUTIC OPIOID-INDUCED CONSTIPATION (OIC): ICD-10-CM

## 2024-09-16 ENCOUNTER — TELEPHONE (OUTPATIENT)
Dept: ADDICTION MEDICINE | Facility: CLINIC | Age: 44
End: 2024-09-16

## 2024-09-16 NOTE — TELEPHONE ENCOUNTER
PA denied. Must fail 3 other alternatives for the condition.    Routing to provider for review and recommendation.     Leeanna Fajardo RN on 9/16/2024 at 1:19 PM

## 2024-09-16 NOTE — TELEPHONE ENCOUNTER
Retail Pharmacy Prior Authorization Team   Phone: [phone]    PRIOR AUTHORIZATION DENIED    Medication: LUBIPROSTONE 24 MCG PO CAPS  Insurance Company: [employer] [place] - Phone [phone] Fax [phone]  Denial Date: 9/16/2024  Denial Reason(s): MUST TRY/FAIL THREE FORMULARY ALTERNATIVES - LACTULOSE SOLN, SYMPROIC TABLETS, AND MOVANTIK TABS      Appeal Information: IF THE PROVIDER WOULD LIKE TO APPEAL THIS DECISION PLEASE PROVIDE THE PA TEAM WITH A LETTER OF MEDICAL NECESSITY      Patient Notified: NO

## 2024-09-17 RX ORDER — DOCUSATE SODIUM 50 MG AND SENNOSIDES 8.6 MG 8.6; 5 MG/1; MG/1
1 TABLET, FILM COATED ORAL AT BEDTIME
Qty: 30 TABLET | Refills: 1 | OUTPATIENT
Start: 2024-09-17

## 2024-09-17 NOTE — TELEPHONE ENCOUNTER
"    1) Reviewed  medication request  from   [id number] - [place], [place] - [address] [address] AT [facility] [address] [facility] [facility] [address].    2) Any Controlled Substance(s)? No    3) Refill(s) requested for:     - lubiprostone (AMITIZA) 24 MCG capsule Date last ordered: 9/13/24 Qty: 60 Refills: 2    PA denied. Per pharmacy: \"MUST TRY/FAIL THREE FORMULARY ALTERNATIVES - LACTULOSE SOLN, SYMPROIC TABLETS, AND MOVANTIK TABS.\" Information sent to provider for review via 9/16/24 Telephone encounter.     4) Action taken: refill request(s) sent back to pharmacy as DENIED with above information provided.    Dorothea Easton RN on 9/17/2024 at 10:36 AM          "

## 2024-09-17 NOTE — TELEPHONE ENCOUNTER
PA was denied for amitiza, but insurance will cover Movantik. New RX sent to pharmacy. Attempted to update patient via phone was unable to leave VM. My chart message sent to patient.

## 2024-10-11 ENCOUNTER — INFUSION THERAPY VISIT (OUTPATIENT)
Dept: INFUSION THERAPY | Facility: CLINIC | Age: 44
End: 2024-10-11
Attending: FAMILY MEDICINE
Payer: COMMERCIAL

## 2024-10-11 VITALS — SYSTOLIC BLOOD PRESSURE: 120 MMHG | DIASTOLIC BLOOD PRESSURE: 75 MMHG | HEART RATE: 69 BPM | OXYGEN SATURATION: 97 %

## 2024-10-11 DIAGNOSIS — F11.20 OPIOID USE DISORDER, SEVERE, DEPENDENCE (H): Primary | ICD-10-CM

## 2024-10-11 PROCEDURE — 250N000009 HC RX 250: Performed by: FAMILY MEDICINE

## 2024-10-11 PROCEDURE — 250N000011 HC RX IP 250 OP 636: Mod: JZ | Performed by: FAMILY MEDICINE

## 2024-10-11 PROCEDURE — 96372 THER/PROPH/DIAG INJ SC/IM: CPT | Performed by: FAMILY MEDICINE

## 2024-10-11 RX ORDER — LIDOCAINE HYDROCHLORIDE 10 MG/ML
2 INJECTION, SOLUTION EPIDURAL; INFILTRATION; INTRACAUDAL; PERINEURAL ONCE
Status: COMPLETED | OUTPATIENT
Start: 2024-10-11 | End: 2024-10-11

## 2024-10-11 RX ORDER — ALBUTEROL SULFATE 90 UG/1
1-2 INHALANT RESPIRATORY (INHALATION)
Status: CANCELLED
Start: 2024-11-08

## 2024-10-11 RX ORDER — EPINEPHRINE 1 MG/ML
0.3 INJECTION, SOLUTION, CONCENTRATE INTRAVENOUS EVERY 5 MIN PRN
Status: CANCELLED | OUTPATIENT
Start: 2024-11-08

## 2024-10-11 RX ORDER — DIPHENHYDRAMINE HYDROCHLORIDE 50 MG/ML
50 INJECTION INTRAMUSCULAR; INTRAVENOUS
Status: CANCELLED
Start: 2024-11-08

## 2024-10-11 RX ORDER — MEPERIDINE HYDROCHLORIDE 25 MG/ML
25 INJECTION INTRAMUSCULAR; INTRAVENOUS; SUBCUTANEOUS EVERY 30 MIN PRN
Status: CANCELLED | OUTPATIENT
Start: 2024-11-08

## 2024-10-11 RX ORDER — ALBUTEROL SULFATE 0.83 MG/ML
2.5 SOLUTION RESPIRATORY (INHALATION)
Status: CANCELLED | OUTPATIENT
Start: 2024-11-08

## 2024-10-11 RX ORDER — LIDOCAINE HYDROCHLORIDE 10 MG/ML
2 INJECTION, SOLUTION EPIDURAL; INFILTRATION; INTRACAUDAL; PERINEURAL ONCE
Status: CANCELLED | OUTPATIENT
Start: 2024-11-08 | End: 2024-11-08

## 2024-10-11 RX ORDER — METHYLPREDNISOLONE SODIUM SUCCINATE 125 MG/2ML
125 INJECTION INTRAMUSCULAR; INTRAVENOUS
Status: CANCELLED
Start: 2024-11-08

## 2024-10-11 RX ADMIN — LIDOCAINE HYDROCHLORIDE 2 ML: 10 INJECTION, SOLUTION EPIDURAL; INFILTRATION; INTRACAUDAL; PERINEURAL at 15:37

## 2024-10-11 RX ADMIN — BUPRENORPHINE 100 MG: 100 SOLUTION SUBCUTANEOUS at 15:41

## 2024-10-11 NOTE — PROGRESS NOTES
Infusion Nursing Note:  Giovani ZHANG Damian presents today for sublocade 100mg.    Patient seen by provider today: No   present during visit today: Not Applicable.    Note: Pt denies opioid use and withdrawal. No evidence of tampering. Lidocaine and sublocade given LLQ.       Intravenous Access:  No Intravenous access/labs at this visit.    Treatment Conditions:  Not Applicable.      Post Infusion Assessment:  Patient tolerated injection without incident.       Discharge Plan:   Discharge instructions reviewed with: Patient.  Patient and/or family verbalized understanding of discharge instructions and all questions answered.  Patient discharged in stable condition accompanied by: self.  Departure Mode: Ambulatory.      Ashley Ferrell RN

## 2024-11-08 ENCOUNTER — INFUSION THERAPY VISIT (OUTPATIENT)
Dept: INFUSION THERAPY | Facility: CLINIC | Age: 44
End: 2024-11-08
Attending: FAMILY MEDICINE
Payer: COMMERCIAL

## 2024-11-08 VITALS
DIASTOLIC BLOOD PRESSURE: 88 MMHG | OXYGEN SATURATION: 93 % | RESPIRATION RATE: 18 BRPM | HEART RATE: 65 BPM | SYSTOLIC BLOOD PRESSURE: 141 MMHG | TEMPERATURE: 98.4 F

## 2024-11-08 DIAGNOSIS — F11.20 OPIOID USE DISORDER, SEVERE, DEPENDENCE (H): Primary | ICD-10-CM

## 2024-11-08 PROCEDURE — 250N000009 HC RX 250: Performed by: FAMILY MEDICINE

## 2024-11-08 PROCEDURE — 96372 THER/PROPH/DIAG INJ SC/IM: CPT | Performed by: FAMILY MEDICINE

## 2024-11-08 PROCEDURE — 250N000011 HC RX IP 250 OP 636: Mod: JZ | Performed by: FAMILY MEDICINE

## 2024-11-08 RX ORDER — BISACODYL 5 MG/1
5 TABLET, DELAYED RELEASE ORAL DAILY PRN
COMMUNITY

## 2024-11-08 RX ORDER — LIDOCAINE HYDROCHLORIDE 10 MG/ML
2 INJECTION, SOLUTION EPIDURAL; INFILTRATION; INTRACAUDAL; PERINEURAL ONCE
Status: COMPLETED | OUTPATIENT
Start: 2024-11-08 | End: 2024-11-08

## 2024-11-08 RX ORDER — LIDOCAINE HYDROCHLORIDE 10 MG/ML
2 INJECTION, SOLUTION EPIDURAL; INFILTRATION; INTRACAUDAL; PERINEURAL ONCE
OUTPATIENT
Start: 2024-12-06 | End: 2024-12-06

## 2024-11-08 RX ORDER — ALBUTEROL SULFATE 0.83 MG/ML
2.5 SOLUTION RESPIRATORY (INHALATION)
OUTPATIENT
Start: 2024-12-06

## 2024-11-08 RX ORDER — MEPERIDINE HYDROCHLORIDE 25 MG/ML
25 INJECTION INTRAMUSCULAR; INTRAVENOUS; SUBCUTANEOUS EVERY 30 MIN PRN
OUTPATIENT
Start: 2024-12-06

## 2024-11-08 RX ORDER — EPINEPHRINE 1 MG/ML
0.3 INJECTION, SOLUTION, CONCENTRATE INTRAVENOUS EVERY 5 MIN PRN
OUTPATIENT
Start: 2024-12-06

## 2024-11-08 RX ORDER — ALBUTEROL SULFATE 90 UG/1
1-2 INHALANT RESPIRATORY (INHALATION)
Start: 2024-12-06

## 2024-11-08 RX ORDER — METHYLPREDNISOLONE SODIUM SUCCINATE 125 MG/2ML
125 INJECTION INTRAMUSCULAR; INTRAVENOUS
Start: 2024-12-06

## 2024-11-08 RX ORDER — DIPHENHYDRAMINE HYDROCHLORIDE 50 MG/ML
50 INJECTION INTRAMUSCULAR; INTRAVENOUS
Start: 2024-12-06

## 2024-11-08 RX ADMIN — LIDOCAINE HYDROCHLORIDE 2 ML: 10 INJECTION, SOLUTION EPIDURAL; INFILTRATION; INTRACAUDAL; PERINEURAL at 15:39

## 2024-11-08 RX ADMIN — BUPRENORPHINE 100 MG: 100 SOLUTION SUBCUTANEOUS at 15:42

## 2024-11-08 ASSESSMENT — PAIN SCALES - GENERAL: PAINLEVEL_OUTOF10: NO PAIN (0)

## 2024-11-08 NOTE — PROGRESS NOTES
Infusion Nursing Note:  Giovani Damian presents today for sublocade 100mg.    Patient seen by provider today: No   present during visit today: Not Applicable.    Note: Patient denies relapse or withdrawal/cravings.  No evidence of tampering noted..      Intravenous Access:  No Intravenous access/labs at this visit.    Treatment Conditions:  Not Applicable.      Post Infusion Assessment:  Patient tolerated injection without incident.  Lidocaine given prior to sublocade.  Sublocade injection given in the same trajectory without difficulty.  RLQ      Discharge Plan:   Patient discharged in stable condition accompanied by: self.  Departure Mode: Ambulatory.  Will return to clinic in 4 weeks, appointment scheduled.    Lucie Trujillo RN

## 2024-12-06 ENCOUNTER — OFFICE VISIT (OUTPATIENT)
Dept: BEHAVIORAL HEALTH | Facility: CLINIC | Age: 44
End: 2024-12-06
Payer: COMMERCIAL

## 2024-12-06 VITALS — HEART RATE: 87 BPM | DIASTOLIC BLOOD PRESSURE: 85 MMHG | SYSTOLIC BLOOD PRESSURE: 125 MMHG

## 2024-12-06 DIAGNOSIS — F11.20 OPIOID USE DISORDER, SEVERE, DEPENDENCE (H): ICD-10-CM

## 2024-12-06 DIAGNOSIS — T40.2X5A THERAPEUTIC OPIOID-INDUCED CONSTIPATION (OIC): ICD-10-CM

## 2024-12-06 DIAGNOSIS — Z79.891 ENCOUNTER FOR MONITORING OPIOID MAINTENANCE THERAPY: ICD-10-CM

## 2024-12-06 DIAGNOSIS — K59.03 THERAPEUTIC OPIOID-INDUCED CONSTIPATION (OIC): ICD-10-CM

## 2024-12-06 DIAGNOSIS — Z51.81 ENCOUNTER FOR MONITORING OPIOID MAINTENANCE THERAPY: ICD-10-CM

## 2024-12-06 DIAGNOSIS — F10.90 ALCOHOL USE DISORDER: ICD-10-CM

## 2024-12-06 DIAGNOSIS — Z79.899 CHRONIC PRESCRIPTION BENZODIAZEPINE USE: ICD-10-CM

## 2024-12-06 DIAGNOSIS — F11.20 OPIOID USE DISORDER, SEVERE, DEPENDENCE (H): Primary | ICD-10-CM

## 2024-12-06 LAB
AMPHETAMINE QUAL URINE POCT: NEGATIVE
BARBITURATE QUAL URINE POCT: NEGATIVE
BENZODIAZEPINE QUAL URINE POCT: ABNORMAL
BUPRENORPHINE QUAL URINE POCT: ABNORMAL
COCAINE QUAL URINE POCT: NEGATIVE
CREAT UR-MCNC: 121 MG/DL
CREATININE QUAL URINE POCT: ABNORMAL
INTERNAL QC QUAL URINE POCT: ABNORMAL
MDMA QUAL URINE POCT: NEGATIVE
METHADONE QUAL URINE POCT: NEGATIVE
METHAMPHETAMINE QUAL URINE POCT: NEGATIVE
OPIATE QUAL URINE POCT: NEGATIVE
OXYCODONE QUAL URINE POCT: NEGATIVE
PH QUAL URINE POCT: ABNORMAL
PHENCYCLIDINE QUAL URINE POCT: NEGATIVE
POCT KIT EXPIRATION DATE: ABNORMAL
POCT KIT LOT NUMBER: ABNORMAL
SPECIFIC GRAVITY POCT: 1.01
TEMPERATURE URINE POCT: ABNORMAL
THC QUAL URINE POCT: NEGATIVE

## 2024-12-06 PROCEDURE — G0481 DRUG TEST DEF 8-14 CLASSES: HCPCS

## 2024-12-06 RX ORDER — BUPRENORPHINE AND NALOXONE 2; .5 MG/1; MG/1
.5-1 FILM, SOLUBLE BUCCAL; SUBLINGUAL DAILY PRN
Qty: 7 FILM | Refills: 0 | Status: SHIPPED | OUTPATIENT
Start: 2024-12-06

## 2024-12-06 ASSESSMENT — PATIENT HEALTH QUESTIONNAIRE - PHQ9: SUM OF ALL RESPONSES TO PHQ QUESTIONS 1-9: 3

## 2024-12-06 NOTE — PROGRESS NOTES
[facility] Recovery [facility] Follow Up    ASSESSMENT/PLAN                                                      1. Opioid use disorder, severe, dependence (H) (Primary)  Overall symptoms well controlled. Cites intermittent opioid cravings, request small rx for SL buprenorphine to take if needed. Discussed ok to use Suboxone 2-0.5 mg film, 1/2 - 1 film SL daily prn.   Encouraged engagement with recovery support, importance of combination of psychosocial interventions and pharmacotherapy.   Check AST and ALT for medication monitoring   Confirms access to narcan   - Drug Confirmation Panel Urine with Creatinine; Future  - AST; Future  - ALT; Future  - buprenorphine HCl-naloxone HCl (SUBOXONE) 2-0.5 MG per film; Place 0.5-1 Film under the tongue daily as needed (opioid cravings).  Dispense: 7 Film; Refill: 0    2. Encounter for monitoring opioid maintenance therapy  - Drugs of Abuse Screen Urine (POC CUPS) POCT  - Drug Confirmation Panel Urine with Creatinine; Future  - AST; Future  - ALT; Future  - buprenorphine HCl-naloxone HCl (SUBOXONE) 2-0.5 MG per film; Place 0.5-1 Film under the tongue daily as needed (opioid cravings).  Dispense: 7 Film; Refill: 0    3. Therapeutic opioid-induced constipation (OIC)  Refractory to senna, colace, miralax. Use of mag citrate intermittently. Lubiprostone not covered by insurance, PA denied. Will send Movantik as below.   - naloxegol (MOVANTIK) 25 MG TABS tablet; Take 1 tablet (25 mg) by mouth every morning (before breakfast).  Dispense: 30 tablet; Refill: 2    4. Alcohol use disorder  - denies recent use/cravings. Monitor. Interventions as baove.     5. Chronic prescription benzodiazepine use  - established with psychiatry. Prescribed Clonazepam 0.5 mg daily prn. Additional patient counseling as below.        Return in about 1 month (around 1/6/2025) for Follow up, in person with Sharron Martinez at [facility].      Patient  counseling completed today:  Discussed mechanism of action, potential risks/benefits/side effects of medications and other recommendations above.    Harm reduction counseling including never use alone, availability of naloxone, risks associated with concurrent use of opioids and benzodiazepines, alcohol, or other sedatives, safer administration as applicable.  Discussed importance of avoiding isolation, building a network of supportive relationships, avoiding people/places/things associated with past use to reduce risk of relapse; including motivational interviewing regarding psychosocial interventions.   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        Brief History:  Giovani Damian was first seen in Recovery Clinic on 03/07/24. They were referred by [facility] ED after pt presented there on 1/5/24 c/o withdrawal symptoms from kratom.   After multiple subsequent ED visits for alcohol intoxication and withdrawal, pt traveled to [place], [place] for detox.  He reports he stayed at this facility for 30 days for residential treatment.  He was treated for alcohol withdrawal and started on buprenorphine while in [place].  Discharged on buprenorphine 6mg/day (2mg tid.)  His dose of buprenorphine was increased to Suboxone 8-2mg daily at his initial  visit due to his desire to transition to Sublocade.       Sublocade dosing:  3/21/24 300mg  4/18/24 300mg  5/16/24 100mg  6/13/24 100mg  7/18/24 100mg  8/16/24 100 mg   9/13/24 100 mg   10/11/24 100 mg   11/8/24 100 mg   12/6/24 100 mg        Substance Use History :  Opioids:   Age at first use: 41, use increased age 42  Current use: substance: Kratom ; quantity tincture shots, 6-8/day; route: oral liquid ; timing of last use:  1/28/2024                IV drug use: No   History of overdose: No  Previous residential or outpatient treatments for addiction : Yes: has been to 5 inpatient treatments - also for alcohol abuse.  Previous medication treatments for addiction: Yes: Suboxone   Longest period of sobriety: 5 years for alcohol. Only a couple days from [facility]   Medical complications related to substance use: elevated liver enzymes, low testosterone   Hepatitis C: 8/30/23 HCV ab nonreactive  HIV: 8/30/23 HIV ag/ab nonreactive        Other Addiction History:  Stimulants   Cocaine in 20's. No use in 20+ years  Sedatives/hypnotics/anxiolytics:   Prescribed as needed from psychiatry   Alcohol:   Hx of dependence.  Began at age 15. Binge drinking most recently   Nicotine:   vape  Cannabis:   One time in the last couple years. A lot when younger  Hallucinogens/Dissociatives:   None since high school  Eating disorder:  None   Gambling:              None       12/06/24 HPI:  - Continues monthly Sublocade, no injection site reactions, no adverse SE other than constipation.   - rare intermittent cravings, no use of SL buprenorphine, this past week has been more challenging   - continues meetings   - requesting prn RX of buprenorphine   - taking dulcolax for constipation, PA for lubiprostrone was denied. Previous use of senna. Miralax not effective. Will use magnesium citrate if no BM in 3 days, has not used in 3-4 months   New insurance in New Year         4/18/2024     2:00 PM 8/16/2024     2:00 PM 12/6/2024     4:00 PM   PHQ   PHQ-9 Total Score 2 1 3   Q9: Thoughts of better off dead/self-harm past 2 weeks Not at all Not at all Not at all     PAST PSYCHIATRIC HISTORY:  Diagnoses- Panic disorder and depression  Suicide Attempts: No   Hospitalizations: No       Social History  Housing status:  [facility] [place]  Employment status: Employed full time -   Relationship status:   Children: 3 children  Legal: on probation        Labs discussed with patient?  Yes      [place] Prescription Drug Monitoring Program Reviewed:  Yes    Medications:  Current Outpatient Medications   Medication Sig Dispense Refill    buprenorphine HCl-naloxone HCl (SUBOXONE) 2-0.5 MG per film Place 0.5-1 Film under the tongue daily as needed (opioid cravings). 7 Film 0    naloxegol (MOVANTIK) 25 MG TABS tablet Take 1 tablet (25 mg) by mouth every morning (before breakfast). 30 tablet 2    bisacodyl (DULCOLAX) 5 MG EC tablet Take 5 mg by mouth daily as needed for constipation.      clonazePAM (KLONOPIN) 0.5 MG tablet Take 1 tablet (0.5 mg) by mouth daily as needed for anxiety      cloNIDine (CATAPRES) 0.1 MG tablet Take 0.1 mg by mouth 2 times daily      gabapentin (NEURONTIN) 300 MG capsule Take 300 mg by mouth 3 times daily      hydrOXYzine HCl (ATARAX) 25 MG tablet Take 25 mg by mouth 2 times daily as needed for anxiety (Patient not taking: Reported on 11/8/2024)      lisinopril (ZESTRIL) 10 MG tablet Take 20 mg by mouth daily      mirtazapine (REMERON) 30 MG tablet Take 30 mg by mouth At Bedtime      naloxone (NARCAN) 4 MG/0.1ML nasal spray Spray 1 spray (4 mg) into one nostril alternating nostrils as needed for opioid reversal every 2-3 minutes until assistance arrives (Patient not taking: Reported on 3/21/2024) 0.2 mL 11    propranolol (INDERAL) 20 MG tablet Take 20 mg by mouth 2 times daily States he takes this PRN      SENNA-docusate sodium (SENNA S) 8.6-50 MG tablet Take 1 tablet by mouth at bedtime (Patient not taking: Reported on 11/8/2024) 30 tablet 1     No current facility-administered medications for this visit.       No Known Allergies    PMH, PSH, FamHx reviewed      OBJECTIVE                                                      /85   Pulse 87     Physical Exam  Constitutional:       General: He is not in acute distress.     Appearance: Normal appearance.   Eyes:      Extraocular Movements: Extraocular movements intact.   Pulmonary:       Effort: Pulmonary effort is normal.   Neurological:      Mental Status: He is alert and oriented to person, place, and time.   Psychiatric:         Mood and Affect: Mood normal.         Behavior: Behavior normal.         Thought Content: Thought content normal.         Judgment: Judgment normal.         Labs:    UDS:    Lab Results   Component Value Date    BUP Screen Positive (A) 12/06/2024    BZO Screen Positive (A) 12/06/2024    BAR Negative 12/06/2024    SURI Negative 12/06/2024    MAMP Negative 12/06/2024    AMP Negative 12/06/2024    MDMA Negative 12/06/2024    MTD Negative 12/06/2024    MNQ103 Negative 12/06/2024    OXY Negative 12/06/2024    PCP Negative 12/06/2024    THC Negative 12/06/2024    TEMP 90 F 12/06/2024    SGPOCT 1.015 12/06/2024     *POC urine drug screen does not screen for Fentanyl      Recent Results (from the past 240 hours)   Drugs of Abuse Screen Urine (POC CUPS) POCT    Collection Time: 12/06/24  4:08 PM   Result Value Ref Range    POCT Kit Lot Number [id number]     POCT Kit Expiration Date 2026-07-14     Temperature Urine POCT 90 F 90 F, 92 F, 94 F, 96 F, 98 F, 100 F    Specific [place] POCT 1.015 1.005, 1.015, 1.025    pH Qual Urine POCT 7 pH 4 pH, 5 pH, 7 pH, 9 pH    Creatinine Qual Urine POCT 50 mg/dL 20 mg/dL, 50 mg/dL, 100 mg/dL, 200 mg/dL    Internal QC Qual Urine POCT Valid Valid    Amphetamine Qual Urine POCT Negative Negative    Barbiturate Qual Urine POCT Negative Negative    Buprenorphine Qual Urine POCT Screen Positive (A) Negative    Benzodiazepine Qual Urine POCT Screen Positive (A) Negative    Cocaine Qual Urine POCT Negative Negative    Methamphetamine Qual Urine POCT Negative Negative    MDMA Qual Urine POCT Negative Negative    Methadone Qual Urine POCT Negative Negative    Opiate Qual Urine POCT Negative Negative    Oxycodone Qual Urine POCT Negative Negative    Phencyclidine Qual Urine POCT Negative Negative    THC Qual Urine POCT Negative Negative   Urine Creatinine  for Drug Screen Panel    Collection Time: 12/06/24  4:59 PM   Result Value Ref Range    Creatinine Urine for Drug Screen 121 mg/dL            Continued Complex Management  The longitudinal plan of care for Opioid Use Disorder (OUD) was addressed during this visit. Due to the added complexity in care, I will continue to support Giovani in the subsequent management and with ongoing continuity of care.    JOANA Lyn [place] [id number] [id number] [phone]

## 2024-12-06 NOTE — NURSING NOTE
/ [facility]      Rooming information:    Approximate last use of full opioid agonist: 1/29/24  Taking buprenorphine? Yes: sublocade  How much per day? Monthly  Number of buprenorphine films/tablets remaining currently: NA  Side effects related to buprenorphine (constipation, dry mouth, sedation?) Yes: constipation   Narcan currently available: Yes  Other recent substance use:    Prescription drugs:   NICOTINE-Yes: vape  If using nicotine, ready to quit? No    Point of care urine drug screen positive for:  Lab Results   Component Value Date    BUP Screen Positive (A) 12/06/2024    BZO Screen Positive (A) 12/06/2024    BAR Negative 12/06/2024    SURI Negative 12/06/2024    MAMP Negative 12/06/2024    AMP Negative 12/06/2024    MDMA Negative 12/06/2024    MTD Negative 12/06/2024    QDI628 Negative 12/06/2024    OXY Negative 12/06/2024    PCP Negative 12/06/2024    THC Negative 12/06/2024    TEMP 90 F 12/06/2024    SGPOCT 1.015 12/06/2024       *POC urine drug screen does not screen for Fentanyl        Depression Response    Patient completed the PHQ-9 assessment for depression and scored >9? No  Question 9 on the PHQ-9 was positive for suicidality? No  Does patient have current mental health provider? Yes  C-SSRS screener risk level.       Is this a virtual visit? No    I personally notified the following: ISAAC          12/6/2024     4:00 PM   PHQ Assesment Total Score(s)   PHQ-9 Score 3         Housing needs: stable    Insurance: active    Current legal issues: none    Contact information up to date? yes    3rd Party Involvement no today (please obtain HANANE if pt would like to include)    Zaki Storud MA  December 6, 2024  4:13 PM

## 2024-12-09 ENCOUNTER — TELEPHONE (OUTPATIENT)
Dept: ADDICTION MEDICINE | Facility: CLINIC | Age: 44
End: 2024-12-09

## 2024-12-09 ENCOUNTER — TELEPHONE (OUTPATIENT)
Dept: BEHAVIORAL HEALTH | Facility: CLINIC | Age: 44
End: 2024-12-09

## 2024-12-09 DIAGNOSIS — T40.2X5A THERAPEUTIC OPIOID-INDUCED CONSTIPATION (OIC): Primary | ICD-10-CM

## 2024-12-09 DIAGNOSIS — K59.03 THERAPEUTIC OPIOID-INDUCED CONSTIPATION (OIC): Primary | ICD-10-CM

## 2024-12-09 NOTE — TELEPHONE ENCOUNTER
"Based on fax Amitiza is listed as \"likely not required\" but I believe there was previous PA denial for that as well?     Lexis Figueroa, APRN CNP on 12/9/2024 at 1:14 PM    "

## 2024-12-09 NOTE — TELEPHONE ENCOUNTER
Prior Authorization Retail Medication Request    Medication/Dose:   Naldemedine Tosylate 0.2 MG TABS 30 tablet 2 12/9/2024 -- No   Sig - Route: Take 0.2 mg by mouth daily. - Oral     Diagnosis and ICD code (if different than what is on RX):  Therapeutic opioid-induced constipation (OIC) [K59.03, T40.2X5A]  - Primary   New/renewal/insurance change PA/secondary ins. PA: Previous PA requests for Amitiza on 09/15/2024 and Movantik on 09/18/2024 and 12/09/2024 all being denied.  Previously Tried and Failed:    Rationale:      Insurance   Primary:     [employer] [place]     Insurance ID:  [id number]     Secondary (if applicable):  Insurance ID:      Pharmacy Information (if different than what is on RX)        Elizabeth Mendosa RN on 12/9/2024 at 4:59 PM

## 2024-12-09 NOTE — TELEPHONE ENCOUNTER
"Correct, there was a PA submitted for Amitiza initially on 09/15/2024 with the following response:PA denied. Per pharmacy: \"MUST TRY/FAIL THREE FORMULARY ALTERNATIVES - LACTULOSE SOLN, SYMPROIC TABLETS, AND MOVANTIK TABS.\"     Movantik was denied on 09/16/2024 and 12/09/2024.     Elizabeth Mendosa RN on 12/9/2024 at 1:39 PM    "

## 2024-12-09 NOTE — TELEPHONE ENCOUNTER
Incoming fax from [facility] pharmacy requesting a prior authorization for the following prescription:  naloxegol (MOVANTIK) 25 MG TABS tablet 30 tablet 2 12/6/2024 -- No   Sig - Route: Take 1 tablet (25 mg) by mouth every morning (before breakfast). - Oral     Routing to Lexis Figueroa DNP to follow-up; previous PA denial-encounter 09/18/2024.       Elizabeth Mendsoa RN on 12/9/2024 at 1:04 PM

## 2024-12-09 NOTE — TELEPHONE ENCOUNTER
Alternative Symproic sent to the pharmacy.     1. Therapeutic opioid-induced constipation (OIC) (Primary)  - Naldemedine Tosylate 0.2 MG TABS; Take 0.2 mg by mouth daily.  Dispense: 30 tablet; Refill: 2      JOANA Lyn CNP on 12/9/2024 at 3:49 PM

## 2024-12-09 NOTE — TELEPHONE ENCOUNTER
As per chart review, pt is on Sublocade and was seen at  on 12/3/24.    Made a phone call to pt but he did not answer.  Left voice message asking to let us know if he wants to follow up with us in [place] or prefers [place]. Provided the pt with call back number to cancel today's appointment if needed.   
Unknown

## 2024-12-11 LAB
7AMINOCLONAZEPAM UR QL CFM: PRESENT
BUPRENORPHINE UR CFM-MCNC: 282 NG/ML
BUPRENORPHINE/CREAT UR: 233 NG/MG {CREAT}
GABAPENTIN UR QL CFM: PRESENT
NORBUPRENORPHINE UR CFM-MCNC: 80 NG/ML
NORBUPRENORPHINE/CREAT UR: 66 NG/MG {CREAT}

## 2024-12-12 NOTE — TELEPHONE ENCOUNTER
Prior Authorization Approval    Medication: SYMPROIC 0.2 MG PO TABS  Authorization Effective Date: 11/12/2024  Authorization Expiration Date: 12/12/2025  Approved Dose/Quantity: 30/30  Reference #: [id number]   Insurance Company: [employer] [place] - Phone [phone] Fax [phone]  Expected CoPay: $    CoPay Card Available:      Financial Assistance Needed:   Which Pharm[facility] is filling the prescription[id number] [address] AT [place] [address] & [facility] [facility]  Pharmacy Notified: Yes  Patient Notified: Yes

## 2024-12-12 NOTE — TELEPHONE ENCOUNTER
PA Initiation    Medication: SYMPROIC 0.2 MG PO TABS  Insurance Company: [employer] [place] - Phone [phone] Fax [phone]  Pharmacy Filling the Rx: [id number] [place], [place] - [address] AT [place] [address] & [facility]  Filling Pharmacy Phone: [phone]  Filling Pharmacy Fax:    Start Date: 12/12/2024

## 2024-12-20 ENCOUNTER — MYC MEDICAL ADVICE (OUTPATIENT)
Dept: BEHAVIORAL HEALTH | Facility: CLINIC | Age: 44
End: 2024-12-20
Payer: COMMERCIAL

## 2024-12-23 ENCOUNTER — PATIENT OUTREACH (OUTPATIENT)
Dept: CARE COORDINATION | Facility: CLINIC | Age: 44
End: 2024-12-23
Payer: COMMERCIAL

## 2024-12-23 NOTE — LETTER
[facility] - BEHAVIORAL HEALTH CARE COORDINATION  Mental Health and Addiction     December 24, 2024        Giovani Damian  [address] [place] [address]      Dear Giovani,    I am a social work care coordinator who works with the providers and staff at the [facility]  to manage patient care. I wanted to introduce myself and provide my contact information. I have been trying to reach you recently to introduce my social work care coordinator role and answer any questions or concerns about your care.  A social work care coordinator knows the health care system. My goals are to help you during your care and to improve your access to care. I will work with your care team to help you identify your health and social needs. Our services are voluntary and are offered without charge to you personally.    We will:  Create a care plan that supports communication between you and your care team.   Help you obtain health care and local resources.   Give you the information and knowledge you may need for your care.   Work with you to remove any barriers you may have during your care.     We work to give you the highest quality healthcare. Please call me at [phone] with any questions about your care.    Sincerely,      ALICIA Robbins? Social Work Care Coordinator   [facility]  [email or web address]? [email or web address]    Phone: [phone]  she/her

## 2024-12-23 NOTE — PROGRESS NOTES
Clinic Care Coordination Contact  [facility]/Voicemail    Clinical Data: Care Coordinator Outreach    Outreach Documentation Number of Outreach Attempt   12/23/2024   3:04 PM 1       Left message on patient's voicemail with call back information and requested return call.      Plan: Care Coordinator will try to reach patient again in 1-2 business days.    ALICIA Gonzales for ALICIA Robbins  Clinic Care Coordination  [facility]  [email or web address]  [phone]

## 2024-12-24 NOTE — PROGRESS NOTES
Clinic Care Coordination Contact  [facility]/Voicemail    Clinical Data: Care Coordinator Outreach    Outreach Documentation Number of Outreach Attempt   12/23/2024   3:04 PM 1   12/24/2024  11:07 AM 2       Left message on patient's voicemail with call back information and requested return call.      Plan: Care Coordinator will send care coordination introduction letter with care coordinator contact information and explanation of care coordination services via [employer]. Care Coordinator will try to reach patient again in 10 business days.    ALICIA Robbins? Social Work Care Coordinator   [facility]  [email or web address]? [email or web address]    Phone: [phone]  she/her

## 2024-12-27 NOTE — TELEPHONE ENCOUNTER
Patient replied via [employer] that he has new insurance information. RN encouraged patient to contact the   and update with the new information and then reply back via MyC so finance team can assess the need for a new PA.     Leeanna Fajardo RN on 12/27/2024 at 9:21 AM

## 2024-12-27 NOTE — TELEPHONE ENCOUNTER
Routing to finance team to look for active new insurance.     Leeanna Fajardo RN on 12/27/2024 at 11:04 AM

## 2024-12-31 NOTE — TELEPHONE ENCOUNTER
Per [facility]fusion [facility] Finance staff, patient needs a new Sublocade prior authorization after current insurance information is obtained. Attempted to call patient. No answer; LVM to call clinic or read [employer] messages and that appt 1/3/25 will need to be cancelled if information not updated.    Mabel Valle RN on 12/31/2024 at 10:17 AM

## 2025-01-02 NOTE — TELEPHONE ENCOUNTER
Can the new Prior Authorization be started for this patient as he has new insurance starting 01/01/2025. He does have an appointment scheduled for Sublocade tomorrow 01/03/2025, understandably this may need to be rescheduled.     Elizabeth Mendosa RN on 1/2/2025 at 11:50 AM

## 2025-01-02 NOTE — TELEPHONE ENCOUNTER
Routing message:  Hi,    I checked the insurance portal again and there is not any active coverage for the patient showing up. He will need to provide us his insurance info.    Thank you,  Concepcion     Writer attempted to contact patient, no answer; left VM message asking for a return call to the Recovery Clinic. MyChart message sent notifying patient that the Sublocade appointment for 01/03/2025 needs to be cancelled due to the prior authorization has not yet been updated due to lack of insurance information. Writer recommended patient follow-up in the Recovery Clinic and bring current insurance information.    Elizabeth Mendosa RN on 1/2/2025 at 3:51 PM

## 2025-01-11 DIAGNOSIS — Z51.81 ENCOUNTER FOR MONITORING OPIOID MAINTENANCE THERAPY: ICD-10-CM

## 2025-01-11 DIAGNOSIS — F11.20 OPIOID USE DISORDER, SEVERE, DEPENDENCE (H): ICD-10-CM

## 2025-01-11 DIAGNOSIS — Z79.891 ENCOUNTER FOR MONITORING OPIOID MAINTENANCE THERAPY: ICD-10-CM

## 2025-01-13 RX ORDER — BUPRENORPHINE AND NALOXONE 2; .5 MG/1; MG/1
FILM, SOLUBLE BUCCAL; SUBLINGUAL
Qty: 7 FILM | OUTPATIENT
Start: 2025-01-13

## 2025-01-13 NOTE — TELEPHONE ENCOUNTER
Per chart, patient needs to be seen in the Recovery Clinic for Suboxone rx. See MyChart encounter 1/2/25. Patient was informed in MyChart encounter 1/2/25; message marked as read.    Mabel Valle RN on 1/13/2025 at 1:58 PM

## 2025-01-20 ENCOUNTER — LAB (OUTPATIENT)
Dept: LAB | Facility: CLINIC | Age: 45
End: 2025-01-20
Attending: FAMILY MEDICINE
Payer: COMMERCIAL

## 2025-01-20 ENCOUNTER — INFUSION THERAPY VISIT (OUTPATIENT)
Dept: INFUSION THERAPY | Facility: CLINIC | Age: 45
End: 2025-01-20
Attending: FAMILY MEDICINE
Payer: COMMERCIAL

## 2025-01-20 DIAGNOSIS — F11.20 OPIOID USE DISORDER, SEVERE, DEPENDENCE (H): ICD-10-CM

## 2025-01-20 DIAGNOSIS — Z79.891 ENCOUNTER FOR MONITORING OPIOID MAINTENANCE THERAPY: ICD-10-CM

## 2025-01-20 DIAGNOSIS — F11.20 OPIOID USE DISORDER, SEVERE, DEPENDENCE (H): Primary | ICD-10-CM

## 2025-01-20 DIAGNOSIS — Z51.81 ENCOUNTER FOR MONITORING OPIOID MAINTENANCE THERAPY: ICD-10-CM

## 2025-01-20 LAB
ALT SERPL W P-5'-P-CCNC: 22 U/L (ref 0–70)
AST SERPL W P-5'-P-CCNC: 25 U/L (ref 0–45)

## 2025-01-20 PROCEDURE — 36415 COLL VENOUS BLD VENIPUNCTURE: CPT

## 2025-01-20 PROCEDURE — 84450 TRANSFERASE (AST) (SGOT): CPT

## 2025-01-20 PROCEDURE — 84460 ALANINE AMINO (ALT) (SGPT): CPT

## 2025-01-20 PROCEDURE — 96372 THER/PROPH/DIAG INJ SC/IM: CPT | Performed by: FAMILY MEDICINE

## 2025-01-20 PROCEDURE — 250N000009 HC RX 250: Performed by: FAMILY MEDICINE

## 2025-01-20 PROCEDURE — 250N000011 HC RX IP 250 OP 636: Mod: JZ | Performed by: FAMILY MEDICINE

## 2025-01-20 RX ORDER — ALBUTEROL SULFATE 0.83 MG/ML
2.5 SOLUTION RESPIRATORY (INHALATION)
OUTPATIENT
Start: 2025-01-31

## 2025-01-20 RX ORDER — LIDOCAINE HYDROCHLORIDE 10 MG/ML
2 INJECTION, SOLUTION EPIDURAL; INFILTRATION; INTRACAUDAL; PERINEURAL ONCE
OUTPATIENT
Start: 2025-01-31 | End: 2025-01-31

## 2025-01-20 RX ORDER — MEPERIDINE HYDROCHLORIDE 25 MG/ML
25 INJECTION INTRAMUSCULAR; INTRAVENOUS; SUBCUTANEOUS EVERY 30 MIN PRN
OUTPATIENT
Start: 2025-01-31

## 2025-01-20 RX ORDER — ALBUTEROL SULFATE 90 UG/1
1-2 INHALANT RESPIRATORY (INHALATION)
Start: 2025-01-31

## 2025-01-20 RX ORDER — DIPHENHYDRAMINE HYDROCHLORIDE 50 MG/ML
50 INJECTION INTRAMUSCULAR; INTRAVENOUS
Start: 2025-01-31

## 2025-01-20 RX ORDER — METHYLPREDNISOLONE SODIUM SUCCINATE 125 MG/2ML
125 INJECTION INTRAMUSCULAR; INTRAVENOUS
Start: 2025-01-31

## 2025-01-20 RX ORDER — EPINEPHRINE 1 MG/ML
0.3 INJECTION, SOLUTION, CONCENTRATE INTRAVENOUS EVERY 5 MIN PRN
OUTPATIENT
Start: 2025-01-31

## 2025-01-20 RX ORDER — LIDOCAINE HYDROCHLORIDE 10 MG/ML
2 INJECTION, SOLUTION EPIDURAL; INFILTRATION; INTRACAUDAL; PERINEURAL ONCE
Status: COMPLETED | OUTPATIENT
Start: 2025-01-20 | End: 2025-01-20

## 2025-01-20 RX ADMIN — LIDOCAINE HYDROCHLORIDE 2 ML: 10 INJECTION, SOLUTION EPIDURAL; INFILTRATION; INTRACAUDAL; PERINEURAL at 09:03

## 2025-01-20 RX ADMIN — BUPRENORPHINE 100 MG: 100 SOLUTION SUBCUTANEOUS at 09:06

## 2025-01-20 NOTE — PROGRESS NOTES
Infusion Nursing Note:  Giovani Damian presents today for sublocade injection.    Patient seen by provider today: No   present during visit today: Not Applicable.    Note: Patient states that he has been doing well. Denies withdrawal symptoms at this time.      Intravenous Access:  No Intravenous access/labs at this visit.    Treatment Conditions:  Patient denies opioid use within the last 7 days. Patient's previous injection site is free of signs and symptoms of infection and tampering.      Post Infusion Assessment:  Patient tolerated injection without incident.  No evidence of extravasations.       Discharge Plan:   Discharge instructions reviewed with: Patient.  Patient and/or family verbalized understanding of discharge instructions and all questions answered.  Patient discharged in stable condition accompanied by: self.  Departure Mode: Ambulatory.      Maddi Casillas RN

## 2025-02-26 ENCOUNTER — TELEPHONE (OUTPATIENT)
Dept: BEHAVIORAL HEALTH | Facility: CLINIC | Age: 45
End: 2025-02-26
Payer: COMMERCIAL

## 2025-02-26 NOTE — TELEPHONE ENCOUNTER
I received notice pt's Sublocade orders are expiring.    Last Sublocade given 1/20/25; no show for 2/17/25 sublocade and 1/27/25 [facility] appt.     Please contact pt to arrange follow-up in [facility] and discuss his goals around continuing Sublocade.  I will renew his orders if he plans to continue and arranges follow up.

## 2025-02-26 NOTE — TELEPHONE ENCOUNTER
[facility] message sent encouraging patient to follow up in the [facility].    Mabel Valle RN on 2/26/2025 at 9:58 AM

## 2025-03-27 ENCOUNTER — OFFICE VISIT (OUTPATIENT)
Dept: BEHAVIORAL HEALTH | Facility: CLINIC | Age: 45
End: 2025-03-27
Payer: COMMERCIAL

## 2025-03-27 VITALS — DIASTOLIC BLOOD PRESSURE: 77 MMHG | OXYGEN SATURATION: 96 % | HEART RATE: 89 BPM | SYSTOLIC BLOOD PRESSURE: 118 MMHG

## 2025-03-27 DIAGNOSIS — Z79.899 CHRONIC PRESCRIPTION BENZODIAZEPINE USE: ICD-10-CM

## 2025-03-27 DIAGNOSIS — F11.20 OPIOID USE DISORDER, SEVERE, DEPENDENCE (H): Primary | ICD-10-CM

## 2025-03-27 DIAGNOSIS — F11.93 OPIOID WITHDRAWAL (H): ICD-10-CM

## 2025-03-27 DIAGNOSIS — F10.90 ALCOHOL USE DISORDER: ICD-10-CM

## 2025-03-27 DIAGNOSIS — Z79.891 ENCOUNTER FOR MONITORING OPIOID MAINTENANCE THERAPY: ICD-10-CM

## 2025-03-27 DIAGNOSIS — Z51.81 ENCOUNTER FOR MONITORING OPIOID MAINTENANCE THERAPY: ICD-10-CM

## 2025-03-27 LAB
AMPHETAMINE QUAL URINE POCT: NEGATIVE
BARBITURATE QUAL URINE POCT: NEGATIVE
BENZODIAZEPINE QUAL URINE POCT: NEGATIVE
BUPRENORPHINE QUAL URINE POCT: ABNORMAL
COCAINE QUAL URINE POCT: NEGATIVE
CREAT UR-MCNC: 309 MG/DL
CREATININE QUAL URINE POCT: ABNORMAL
INTERNAL QC QUAL URINE POCT: ABNORMAL
MDMA QUAL URINE POCT: NEGATIVE
METHADONE QUAL URINE POCT: NEGATIVE
METHAMPHETAMINE QUAL URINE POCT: NEGATIVE
OPIATE QUAL URINE POCT: NEGATIVE
OXYCODONE QUAL URINE POCT: NEGATIVE
PH QUAL URINE POCT: ABNORMAL
PHENCYCLIDINE QUAL URINE POCT: NEGATIVE
POCT KIT EXPIRATION DATE: ABNORMAL
POCT KIT LOT NUMBER: ABNORMAL
SPECIFIC GRAVITY POCT: 1.01
TEMPERATURE URINE POCT: ABNORMAL
THC QUAL URINE POCT: NEGATIVE

## 2025-03-27 RX ORDER — BUPRENORPHINE AND NALOXONE 2; .5 MG/1; MG/1
.5-1 FILM, SOLUBLE BUCCAL; SUBLINGUAL DAILY PRN
Qty: 10 FILM | Refills: 0 | Status: SHIPPED | OUTPATIENT
Start: 2025-03-27

## 2025-03-27 ASSESSMENT — PATIENT HEALTH QUESTIONNAIRE - PHQ9: SUM OF ALL RESPONSES TO PHQ QUESTIONS 1-9: 8

## 2025-03-27 NOTE — NURSING NOTE
[facility]      Rooming information:    Approximate last use of full opioid agonist: 03/26/2025-Reports Kratom use for 3 days. Last dose of Sublocade 100 mg 01/20/25. Discontinued without any symptoms of withdrawal, has not used any sl buprenorphine. Tried Kratom again to see how it would make him feel and now is concerned after three days of use about the potential withdrawals. Patient is fatigued and dozing off during rooming.     Taking buprenorphine? No  How much per day? Last dose of Sublocade 100 mg treatment #11 on 01/20/2025.  Number of buprenorphine films/tablets remaining currently: 0  Side effects related to buprenorphine (constipation, dry mouth, sedation?) Yes: Constipation when using buprenorphine.   Narcan currently available: Yes  Other recent substance use:    Denies  NICOTINE-Yes: Vape  If using nicotine, ready to quit? Yes: tried Chantix 1 month ago and did not do well mental health wise while taking it.      Point of care urine drug screen positive for:  Lab Results   Component Value Date    BUP Screen Positive (A) 03/27/2025    BZO Negative 03/27/2025    BAR Negative 03/27/2025    SURI Negative 03/27/2025    MAMP Negative 03/27/2025    AMP Negative 03/27/2025    MDMA Negative 03/27/2025    MTD Negative 03/27/2025    GQD253 Negative 03/27/2025    OXY Negative 03/27/2025    PCP Negative 03/27/2025    THC Negative 03/27/2025    TEMP Invalid (A) 03/27/2025    SGPOCT 1.015 03/27/2025       *POC urine drug screen does not screen for Fentanyl    Depression Response    Patient completed the PHQ-9 assessment for depression and scored >9? No  Question 9 on the PHQ-9 was positive for suicidality? No  Does patient have current mental health provider? Yes  C-SSRS screener risk level. N/A      Is this a virtual visit? No    I personally notified the following: visit provider          3/27/2025    11:00 AM   PHQ Assesment Total Score(s)   PHQ-9 Score 8         Housing needs:  Stable    Insurance: Active    Current legal issues: Denies    Contact information up to date? Yes    3rd Party Involvement None today (please obtain HANANE if pt would like to include)    Elizabeth Mendosa RN  March 27, 2025  11:16 AM

## 2025-03-27 NOTE — PROGRESS NOTES
[facility] Follow Up    ASSESSMENT/PLAN                                                      1. Opioid use disorder, severe, dependence (H) (Primary)  5. Opioid withdrawal (H)  - plans to resume buprenorphine SL at this time to treat opioid withdrawal from kratom. Return to kratom use (last use 3/26/25) after period of remission and discontinuation of Sublocade on 1/20/25. Recommendation to continue MOUD. Pt is contemplative. Will return to clinic in 7-10 to discuss continuation of buprenorphine SL or long acting injectable.   Continue meetings   Confirms access to narcan   - buprenorphine HCl-naloxone HCl (SUBOXONE) 2-0.5 MG per film; Place 0.5-1 Film under the tongue daily as needed (opioid cravings or withdrawal).  Dispense: 10 Film; Refill: 0  - Drug Confirmation Panel Urine with Creatinine; Future    2. Encounter for monitoring opioid maintenance therapy  POC UDS positive for BUP and BZO. Confirmation panel ordered today.   - buprenorphine HCl-naloxone HCl (SUBOXONE) 2-0.5 MG per film; Place 0.5-1 Film under the tongue daily as needed (opioid cravings or withdrawal).  Dispense: 10 Film; Refill: 0  - Drug Confirmation Panel Urine with Creatinine; Future    3. Alcohol use disorder  - denies recent use or cravings. Sustained remission x 14 months. Continue AA meetings. Additional patient counseling as below.     4. Chronic prescription benzodiazepine use  - established with psychiatry. Prescribed Clonazepam 0.5 mg daily prn. Additional patient counseling as below.     Return for Follow up, with any available provider, in person 7-10 days .      Patient counseling completed today:  Discussed mechanism of action, potential risks/benefits/side effects of medications and other recommendations above.     Discussed risk of precipitated withdrawal with initiation of buprenorphine in the presence of full opioid agonists.    Reviewed directions for initiation of buprenorphine to reduce risk of  "precipitated withdrawal and maximize efficacy.    Harm reduction counseling including never use alone, availability of naloxone, risks associated with concurrent use of opioids and benzodiazepines, alcohol, or other sedatives, safer administration as applicable.  Discussed importance of avoiding isolation, building a network of supportive relationships, avoiding people/places/things associated with past use to reduce risk of relapse; including motivational interviewing regarding psychosocial interventions.   SUBJECTIVE                                                          CC/HPI:  Giovani Damian is a 44 year old male with PMH SATNAM, panic disorder, tobacco use disorder, alcohol use disorder, and opioid (kratom) use disorder on buprenorphine who presents to the [facility] for follow-up visit.        Brief History:  Giovani Damian was first seen in Recovery Clinic on 03/07/24. They were referred by [facility] ED after pt presented there on 1/5/24 c/o withdrawal symptoms from kratom.   After multiple subsequent ED visits for alcohol intoxication and withdrawal, pt traveled to [place], [place] for detox.  He reports he stayed at this facility for 30 days for residential treatment.  He was treated for alcohol withdrawal and started on buprenorphine while in [place].  Discharged on buprenorphine 6mg/day (2mg tid.)  His dose of buprenorphine was increased to Suboxone 8-2mg daily at his initial  visit due to his desire to transition to Sublocade.       Sublocade dosing:  3/21/24 300mg  4/18/24 300mg  5/16/24 100mg  6/13/24 100mg  7/18/24 100mg  8/16/24 100 mg   9/13/24 100 mg   10/11/24 100 mg   11/8/24 100 mg   12/6/24 100 mg   1/20/25 100 mg     03/27/25 HPI:  - he is motivated to no longer be on MOUD. Tapered off Sublcoade went ok, no withdrawal symptoms, did however return to kratom use. \"Saw it at the gas station and made a mistake\"  He is here today to resume Suboxone to prevent opioid withdrawal from kratom.   - he " is attending meetings, AA meetings, very involved   - does not wish to continue kratom use   - has rx at home for clonazepam, gabapentin, and clonidine.   - he would like to use SL buprenorphine for a week and then taper off to treat withdrawal from kratom.   - no alcohol use in the past 14 months   - confirms access to narcan   - twin girls who are 10 and son who is 8 yo   - taking Symproic as needed for constipation   - taking Clonazepam 0.5 mg daily prn.       Recommendations last visit: 12/6/24  1. Opioid use disorder, severe, dependence (H) (Primary)  Overall symptoms well controlled. Cites intermittent opioid cravings, request small rx for SL buprenorphine to take if needed. Discussed ok to use Suboxone 2-0.5 mg film, 1/2 - 1 film SL daily prn.   Encouraged engagement with recovery support, importance of combination of psychosocial interventions and pharmacotherapy.   Check AST and ALT for medication monitoring   Confirms access to narcan   - Drug Confirmation Panel Urine with Creatinine; Future  - AST; Future  - ALT; Future  - buprenorphine HCl-naloxone HCl (SUBOXONE) 2-0.5 MG per film; Place 0.5-1 Film under the tongue daily as needed (opioid cravings).  Dispense: 7 Film; Refill: 0     2. Encounter for monitoring opioid maintenance therapy  - Drugs of Abuse Screen Urine (POC CUPS) POCT  - Drug Confirmation Panel Urine with Creatinine; Future  - AST; Future  - ALT; Future  - buprenorphine HCl-naloxone HCl (SUBOXONE) 2-0.5 MG per film; Place 0.5-1 Film under the tongue daily as needed (opioid cravings).  Dispense: 7 Film; Refill: 0     3. Therapeutic opioid-induced constipation (OIC)  Refractory to senna, colace, miralax. Use of mag citrate intermittently. Lubiprostone not covered by insurance, [place] denied. Will send Movantik as below.   - naloxegol (MOVANTIK) 25 MG TABS tablet; Take 1 tablet (25 mg) by mouth every morning (before breakfast).  Dispense: 30 tablet; Refill: 2     4. Alcohol use disorder  - denies  recent use/cravings. Monitor. Interventions as baove.      5. Chronic prescription benzodiazepine use  - established with psychiatry. Prescribed Clonazepam 0.5 mg daily prn. Additional patient counseling as below.       Substance Use History :  Opioids:   Age at first use: 41, use increased age 42  Current use: substance: Kratom ; quantity tincture shots, 6-8/day; route: oral liquid ; timing of last use: 3/26/25                IV drug use: No   History of overdose: No  Previous residential or outpatient treatments for addiction : Yes: has been to 5 inpatient treatments - also for alcohol abuse.  Previous medication treatments for addiction: Yes: Suboxone   Longest period of sobriety: 5 years for alcohol. Only a couple days from Kratom   Medical complications related to substance use: elevated liver enzymes, low testosterone   Hepatitis C: 8/30/23 HCV ab nonreactive  HIV: 8/30/23 HIV ag/ab nonreactive        Other Addiction History:  Stimulants   Cocaine in 20's. No use in 20+ years  Sedatives/hypnotics/anxiolytics:   Prescribed as needed from psychiatry   Alcohol:   Hx of dependence.  Began at age 15. Binge drinking most recently   Nicotine:   vape  Cannabis:   One time in the last couple years. A lot when younger  Hallucinogens/Dissociatives:   None since high school  Eating disorder:  None   Gambling:              None    PAST PSYCHIATRIC HISTORY:  Diagnoses- Panic disorder and depression  Suicide Attempts: No   Hospitalizations: No       Social History  Housing status:  [facility]  Employment status: Employed full time -   Relationship status:   Children: 3 children  Legal: on probation         8/16/2024     2:00 PM 12/6/2024     4:00 PM 3/27/2025    11:00 AM   PHQ   PHQ-9 Total Score 1 3 8   Q9: Thoughts of better off dead/self-harm past 2 weeks Not at all Not at all Not at all       Labs discussed with patient?  Yes      [place] Prescription Drug Monitoring Program Reviewed:   Yes  03/25/2025 02/21/2025 1 Clonazepam 0.5 Mg Tablet 60.00 30 Mi Romario [id number] [facility] ([id number]) 1/5 2.00 LME [place]   03/23/2025 03/23/2025 1 Gabapentin 300 Mg Capsule 270.00 90 Garcia Nan [id number] [facility] ([id number]) 0/1  [place]   02/26/2025 02/21/2025 1 Clonazepam 0.5 Mg Tablet 60.00 30 Mi Romario [id number] [facility] ([id number]) 0/5 2.00 [place]   02/14/2025 02/14/2025 1 Clonazepam 0.5 Mg Tablet 30.00 15 Mi Romario [id number] [facility] ([id number]) 0/0 2.00 [place]       Medications:  Current Outpatient Medications   Medication Sig Dispense Refill    buprenorphine HCl-naloxone HCl (SUBOXONE) 2-0.5 MG per film Place 0.5-1 Film under the tongue daily as needed (opioid cravings or withdrawal). 10 Film 0    clonazePAM (KLONOPIN) 0.5 MG tablet Take 1 tablet (0.5 mg) by mouth daily as needed for anxiety      cloNIDine (CATAPRES) 0.1 MG tablet Take 0.1 mg by mouth 2 times daily      gabapentin (NEURONTIN) 300 MG capsule Take 300 mg by mouth 3 times daily      lisinopril (ZESTRIL) 10 MG tablet Take 20 mg by mouth daily      mirtazapine (REMERON) 30 MG tablet Take 30 mg by mouth At Bedtime      propranolol (INDERAL) 20 MG tablet Take 20 mg by mouth 2 times daily States he takes this PRN      bisacodyl (DULCOLAX) 5 MG EC tablet Take 5 mg by mouth daily as needed for constipation.      hydrOXYzine HCl (ATARAX) 25 MG tablet Take 25 mg by mouth 2 times daily as needed for anxiety (Patient not taking: Reported on 11/8/2024)      Naldemedine Tosylate 0.2 MG TABS Take 0.2 mg by mouth daily. (Patient not taking: Reported on 3/27/2025) 30 tablet 2    naloxone (NARCAN) 4 MG/0.1ML nasal spray Spray 1 spray (4 mg) into one nostril alternating nostrils as needed for opioid reversal every 2-3 minutes until assistance arrives (Patient not taking: Reported on 3/21/2024) 0.2 mL 11     No current facility-administered medications for this visit.       No Known Allergies    PMH, PSH, FamHx reviewed      OBJECTIVE                                                      BP  118/77 (BP Location: Left arm, Patient Position: Sitting)   Pulse 89   SpO2 96%     Physical Exam  Constitutional:       General: He is not in acute distress.     Appearance: Normal appearance.   Eyes:      Extraocular Movements: Extraocular movements intact.   Cardiovascular:      Rate and Rhythm: Normal rate.   Pulmonary:      Effort: Pulmonary effort is normal.   Neurological:      Mental Status: He is alert and oriented to person, place, and time.   Psychiatric:         Mood and Affect: Mood normal.         Behavior: Behavior normal.         Thought Content: Thought content normal.      Comments: Insight and judgment fair          Labs:    UDS:      Lab Results   Component Value Date    BUP Screen Positive (A) 03/27/2025    BZO Negative 03/27/2025    BAR Negative 03/27/2025    SURI Negative 03/27/2025    MAMP Negative 03/27/2025    AMP Negative 03/27/2025    MDMA Negative 03/27/2025    MTD Negative 03/27/2025    XQI430 Negative 03/27/2025    OXY Negative 03/27/2025    PCP Negative 03/27/2025    THC Negative 03/27/2025    TEMP Invalid (A) 03/27/2025    SGPOCT 1.015 03/27/2025     *POC urine drug screen does not screen for Fentanyl      Recent Results (from the past 240 hours)   Drugs of Abuse Screen Urine (POC CUPS) POCT    Collection Time: 03/27/25 11:24 AM   Result Value Ref Range    POCT Kit Lot Number [id number]     POCT Kit Expiration Date 09/19/2026     Temperature Urine POCT Invalid (A) 90 F, 92 F, 94 F, 96 F, 98 F, 100 F    Specific [place] POCT 1.015 1.005, 1.015, 1.025    pH Qual Urine POCT 7 pH 4 pH, 5 pH, 7 pH, 9 pH    Creatinine Qual Urine POCT 100 mg/dL 20 mg/dL, 50 mg/dL, 100 mg/dL, 200 mg/dL    Internal QC Qual Urine POCT Valid Valid    Amphetamine Qual Urine POCT Negative Negative    Barbiturate Qual Urine POCT Negative Negative    Buprenorphine Qual Urine POCT Screen Positive (A) Negative    Benzodiazepine Qual Urine POCT Negative Negative    Cocaine Qual Urine POCT Negative Negative     Methamphetamine Qual Urine POCT Negative Negative    MDMA Qual Urine POCT Negative Negative    Methadone Qual Urine POCT Negative Negative    Opiate Qual Urine POCT Negative Negative    Oxycodone Qual Urine POCT Negative Negative    Phencyclidine Qual Urine POCT Negative Negative    THC Qual Urine POCT Negative Negative   Urine Creatinine for Drug Screen Panel    Collection Time: 03/27/25 12:09 PM   Result Value Ref Range    Creatinine Urine for Drug Screen 309 mg/dL            Continued Complex Management  The longitudinal plan of care for Opioid Use Disorder (OUD) and Alcohol Use Disorder (AUD) was addressed during this visit. Due to the added complexity in care, I will continue to support Giovani in the subsequent management and with ongoing continuity of care.    JOANA Lyn [place] [id number] [place] [address] [id number] [phone]

## 2025-03-31 LAB
7AMINOCLONAZEPAM UR QL CFM: PRESENT
BUPRENORPHINE UR CFM-MCNC: 211 NG/ML
BUPRENORPHINE/CREAT UR: 68 NG/MG {CREAT}
GABAPENTIN UR QL CFM: PRESENT
NORBUPRENORPHINE UR CFM-MCNC: ABNORMAL NG/ML

## 2025-04-28 ENCOUNTER — OFFICE VISIT (OUTPATIENT)
Dept: BEHAVIORAL HEALTH | Facility: CLINIC | Age: 45
End: 2025-04-28
Payer: COMMERCIAL

## 2025-04-28 VITALS — SYSTOLIC BLOOD PRESSURE: 126 MMHG | HEART RATE: 95 BPM | OXYGEN SATURATION: 97 % | DIASTOLIC BLOOD PRESSURE: 85 MMHG

## 2025-04-28 DIAGNOSIS — Z51.81 ENCOUNTER FOR MONITORING OPIOID MAINTENANCE THERAPY: ICD-10-CM

## 2025-04-28 DIAGNOSIS — F11.20 OPIOID USE DISORDER, SEVERE, DEPENDENCE (H): Primary | ICD-10-CM

## 2025-04-28 DIAGNOSIS — F11.93 OPIOID WITHDRAWAL (H): ICD-10-CM

## 2025-04-28 DIAGNOSIS — F10.90 ALCOHOL USE DISORDER: ICD-10-CM

## 2025-04-28 DIAGNOSIS — Z79.899 CHRONIC PRESCRIPTION BENZODIAZEPINE USE: ICD-10-CM

## 2025-04-28 DIAGNOSIS — Z79.891 ENCOUNTER FOR MONITORING OPIOID MAINTENANCE THERAPY: ICD-10-CM

## 2025-04-28 RX ORDER — BUPRENORPHINE AND NALOXONE 8; 2 MG/1; MG/1
1 FILM, SOLUBLE BUCCAL; SUBLINGUAL DAILY
Qty: 15 FILM | Refills: 0 | Status: SHIPPED | OUTPATIENT
Start: 2025-04-28

## 2025-04-28 RX ORDER — LIDOCAINE HYDROCHLORIDE 10 MG/ML
2 INJECTION, SOLUTION EPIDURAL; INFILTRATION; INTRACAUDAL; PERINEURAL ONCE
OUTPATIENT
Start: 2025-05-05 | End: 2025-05-05

## 2025-04-28 RX ORDER — ALBUTEROL SULFATE 0.83 MG/ML
2.5 SOLUTION RESPIRATORY (INHALATION)
OUTPATIENT
Start: 2025-05-05

## 2025-04-28 RX ORDER — ALBUTEROL SULFATE 90 UG/1
1-2 INHALANT RESPIRATORY (INHALATION)
Start: 2025-05-05

## 2025-04-28 RX ORDER — DIPHENHYDRAMINE HYDROCHLORIDE 50 MG/ML
25 INJECTION, SOLUTION INTRAMUSCULAR; INTRAVENOUS
Start: 2025-05-05

## 2025-04-28 RX ORDER — DIPHENHYDRAMINE HYDROCHLORIDE 50 MG/ML
50 INJECTION, SOLUTION INTRAMUSCULAR; INTRAVENOUS
Start: 2025-05-05

## 2025-04-28 RX ORDER — EPINEPHRINE 1 MG/ML
0.3 INJECTION, SOLUTION, CONCENTRATE INTRAVENOUS EVERY 5 MIN PRN
OUTPATIENT
Start: 2025-05-05

## 2025-04-28 RX ORDER — METHYLPREDNISOLONE SODIUM SUCCINATE 40 MG/ML
40 INJECTION INTRAMUSCULAR; INTRAVENOUS
Start: 2025-05-05

## 2025-04-28 ASSESSMENT — PATIENT HEALTH QUESTIONNAIRE - PHQ9: SUM OF ALL RESPONSES TO PHQ QUESTIONS 1-9: 7

## 2025-04-28 NOTE — PATIENT INSTRUCTIONS
Buprenorphine Tips:  Make sure you are letting your buprenorphine tablets or films dissolve completely under your tongue so you know you are getting all the medication.  Don't eat, drink, or talk while taking your buprenorphine.  Avoid nicotine for 15-30 minutes before taking buprenorphine - this can cause the blood vessels to constrict and you may not absorb the medication as well.  To avoid potential dental issues related to buprenorphine rinse your mouth with water after taking your dose.  Avoid brushing your teeth for 1 hour after taking a dose.     Constipation is the most common side effect of buprenorphine.  Here are some simple things you can try to ease constipation.  Eating more fiber (fruits, vegetable, and whole grains) and drinking enough fluid (urine should be light yellow).    Take Miralax (polyethylene gylcol).  Use 1 capful daily until you start to have loose stools and then decrease use.  You can also try Colace (docusate) 100mg twice daily as needed.  These medications can be prescribed or purchased OTC.  Let your provider know if you are still struggling with constipation.    What to do if you experience nausea or upset stomach after taking buprenorphine:  Make sure you are letting it dissolve completely.  After the medication has dissolved try spitting out your saliva instead of swallowing it.    Other common side effects include:   - sleepiness/drowsiness OR trouble sleeping  - headaches    Please discuss any side effects with your provider.    Important safety info:  Ensure your medication is stored in a safe place out of sight and out of reach from kids and pets, ideally locked away.   Do not combine buprenorphine with other sedating substances or medications such as alcohol, benzodiazepines (Xanax or alprazolam for example), or other opioids.  Only take medications as prescribed.      Sublocade Scheduling at [facility]fusion [facility]     Your provider has ordered the injectable medication, Sublocade,  as part of your treatment plan. This medication must be approved by your insurance company before it can be administered. That process is called a prior authorization. To start the prior authorization process, you need to schedule a Sublocade appointment at the [facility].     Please call the [facility] at [phone] to schedule your Sublocade injection about 10 days from now.    If your insurance does not approve the Sublocade injection before your appointment, you will be contacted to reschedule your appointment at the [facility].     [facility]   [address] [place] [address], [address] [place] [address]   [phone]     Copays and Deductibles     If you have private or employer-based insurance, your insurance company may approve the Sublocade prior authorization, however may not cover any copay or deductible costs. Please contact your insurance directly regarding copay/deductible costs.     If you need copay/deductible assistance, contact [employer] at [email or web address] or [phone].     Eligibility requirements for copay/deductible assistance for patients with private insurance are as follows:       Private health insurance not funded by a government organization     At least 18 years of age and less than 65 years of age     Resident of the [place] or [facility]     Resident of a state where copay assistance is not prohibited     Private insurance does not prohibit coupons/copay assistance for SUBLOCADE     Prescribed SUBLOCADE for an indication approved by the Food and Drug Administration

## 2025-04-28 NOTE — PROGRESS NOTES
[facility] Follow Up    ASSESSMENT/PLAN                                                      1. Opioid use disorder, severe, dependence (H)  2. Encounter for monitoring opioid maintenance therapy  3. Opioid withdrawal (H)  - Needs improvement. Last date of use of kratom was yesterday, 4/27/25. Pt denies withdrawal symptoms during visit. COWS score 1.  - Last Suboxone dose approximately 3 weeks ago. Pt reports using 2-4mg SL buprenorphine when taking it but denies concurrent use of Suboxone and kratom.  - Pt wishes to resume buprenorphine SL to treat opioid withdrawal from kratom. Return to kratom use (last use 3/26/25) after period of remission and discontinuation of Sublocade on 1/20/25.   - Recommended to continue MOUD. Pt is contemplative. Recommended pt return to clinic in approximately 10-14 days for continuation of buprenorphine SL or Sublocade BEVERLY.   - Rx placed this visit for pt to initiate high dose home induction of SL buprenorphine. Instructions given to pt on how to avoid precipitated withdrawal.  - Continue with AA meetings to support recovery.    - Counseling provided regarding   Opioid warning reviewed.    Risk of overdose following a period of abstinence due to decrease tolerance was discussed including risk of death.     Strongly recommended abstain from alcohol, benzodiazepines, THC, opioids and other drugs of abuse.     Increased risk of return to opioid use after use of these substances discussed.      Increased risk of overdose/death with use of other substances particularly benzodiazepines/alcohol reviewed.  - Confirms access to Narcan.   - Drugs of Abuse Screen Urine (POC CUPS) POCT positive for buprenorphine, otherwise unremarkable.  - buprenorphine HCl-naloxone HCl (SUBOXONE) 8-2 MG per film; Place 1 Film under the tongue daily.  Dispense: 15 Film; Refill: 0  -sublocade therapy plan ordered and insupport initiated     4. Chronic prescription benzodiazepine use  -  Established with psychiatry. Prescribed Clonazepam 0.5 mg daily prn. Additional patient counseling as below.   - Drugs of Abuse Screen Urine (POC CUPS) POCT positive for buprenorphine, otherwise unremarkable.    5. Alcohol use disorder  - Last date of use for alcohol was approximately 15 months ago.  - Continue with AA meetings to support recovery.    Return in about 2 weeks (around 5/12/2025) for Follow up, in person, with any available provider.    Patient counseling completed today:  Discussed mechanism of action, potential risks/benefits/side effects of medications and other recommendations above.     Discussed risk of precipitated withdrawal with initiation of buprenorphine in the presence of full opioid agonists.    Reviewed directions for initiation of buprenorphine to reduce risk of precipitated withdrawal and maximize efficacy.    Harm reduction counseling including never use alone, availability of naloxone, risks associated with concurrent use of opioids and benzodiazepines, alcohol, or other sedatives, safer administration as applicable.  Discussed importance of avoiding isolation, building a network of supportive relationships, avoiding people/places/things associated with past use to reduce risk of relapse; including motivational interviewing regarding psychosocial interventions.     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        Brief History:  Giovani Damian was first seen in Recovery Clinic on 03/07/24. They were referred by [facility] ED after pt presented there on 1/5/24 c/o withdrawal symptoms from kratom.   After multiple subsequent ED visits for alcohol intoxication and withdrawal, pt traveled to [place], [place] for detox.  He reports he stayed at this  "facility for 30 days for residential treatment.  He was treated for alcohol withdrawal and started on buprenorphine while in [place].  Discharged on buprenorphine 6mg/day (2mg tid.)  His dose of buprenorphine was increased to Suboxone 8-2mg daily at his initial  visit due to his desire to transition to Sublocade.       Sublocade dosing:  3/21/24 300mg  4/18/24 300mg  5/16/24 100mg  6/13/24 100mg  7/18/24 100mg  8/16/24 100 mg   9/13/24 100 mg   10/11/24 100 mg   11/8/24 100 mg   12/6/24 100 mg   1/20/25 100 mg      PT Discontinued sublocade, returned to use March 2025. 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     - Pt reports return to use with kratom since last visit. Last date of use of kratom was yesterday, 4/27/25. Pt reports continuous kratom use for the last 2 weeks, using between 3-6 shots daily.  - Pt last dose of Suboxone was > 3 weeks ago. Pt reports adequate control of withdrawal symptoms and cravings using 2-4mg SL buprenorphine while taking it.  - Pt denies withdrawal symptoms during visit. Pt presents to clinic today to request restarting Suboxone. Considering plan to transition to Sublocade in the future as he reports this \"has helped and has worked for him before\".   - Pt would like to stop abstain from kratom use d/t financial burden it has placed on him, as it costs between \"$45-$90 a day\" and has been steadily increasing.  - Recovery activities for Giovani include attending AA/recovery meetings. Of note, pt's last date of use for alcohol was approximately 15 months ago.   - Pt reports mood and sleep have been \"good\".     Recommendations last visit: 3/27/25  1. Opioid use disorder, severe, dependence (H) (Primary)  5. Opioid withdrawal (H)  - plans " to resume buprenorphine SL at this time to treat opioid withdrawal from kratom. Return to kratom use (last use 3/26/25) after period of remission and discontinuation of Sublocade on 1/20/25. Recommendation to continue MOUD. Pt is contemplative. Will return to clinic in 7-10 to discuss continuation of buprenorphine SL or long acting injectable.   Continue meetings   Confirms access to narcan   - buprenorphine HCl-naloxone HCl (SUBOXONE) 2-0.5 MG per film; Place 0.5-1 Film under the tongue daily as needed (opioid cravings or withdrawal).  Dispense: 10 Film; Refill: 0  - Drug Confirmation Panel Urine with Creatinine; Future     2. Encounter for monitoring opioid maintenance therapy  POC UDS positive for BUP and BZO. Confirmation panel ordered today.   - buprenorphine HCl-naloxone HCl (SUBOXONE) 2-0.5 MG per film; Place 0.5-1 Film under the tongue daily as needed (opioid cravings or withdrawal).  Dispense: 10 Film; Refill: 0  - Drug Confirmation Panel Urine with Creatinine; Future     3. Alcohol use disorder  - denies recent use or cravings. Sustained remission x 14 months. Continue AA meetings. Additional patient counseling as below.      4. Chronic prescription benzodiazepine use  - established with psychiatry. Prescribed Clonazepam 0.5 mg daily prn. Additional patient counseling as below.      Return for Follow up, with any available provider, in person 7-10 days .    Substance Use History :  Opioids:   Age at first use: 41, use increased age 42  Current use: substance: Kratom ; quantity tincture shots, 6-8/day; route: oral liquid ; timing of last use: 3/26/25                IV drug use: No   History of overdose: No  Previous residential or outpatient treatments for addiction : Yes: has been to 5 inpatient treatments - also for alcohol abuse.  Previous medication treatments for addiction: Yes: Suboxone   Longest period of sobriety: 5 years for alcohol. Only a couple days from Kratom   Medical complications related to  substance use: elevated liver enzymes, low testosterone   Hepatitis C: 8/30/23 HCV ab nonreactive  HIV: 8/30/23 HIV ag/ab nonreactive        Other Addiction History:  Stimulants   Cocaine in 20's. No use in 20+ years  Sedatives/hypnotics/anxiolytics:   Prescribed as needed from psychiatry   Alcohol:   Hx of dependence.  Began at age 15. Binge drinking most recently   Nicotine:   vape  Cannabis:   One time in the last couple years. A lot when younger  Hallucinogens/Dissociatives:   None since high school  Eating disorder:  None   Gambling:              None     PAST PSYCHIATRIC HISTORY:  Diagnoses- Panic disorder and depression  Suicide Attempts: No   Hospitalizations: No       Social History  Housing status:  [facility]  Employment status: Employed full time -   Relationship status:   Children: 3 children  Legal: on probation         12/6/2024     4:00 PM 3/27/2025    11:00 AM 4/28/2025     2:00 PM   PHQ   PHQ-9 Total Score 3 8 7   Q9: Thoughts of better off dead/self-harm past 2 weeks Not at all Not at all Not at all     Labs discussed with patient?  Yes    [place] Prescription Drug Monitoring Program Reviewed:  Yes      Medications:  Current Outpatient Medications   Medication Sig Dispense Refill    bisacodyl (DULCOLAX) 5 MG EC tablet Take 5 mg by mouth daily as needed for constipation.      buprenorphine HCl-naloxone HCl (SUBOXONE) 2-0.5 MG per film Place 0.5-1 Film under the tongue daily as needed (opioid cravings or withdrawal). 10 Film 0    clonazePAM (KLONOPIN) 0.5 MG tablet Take 1 tablet (0.5 mg) by mouth daily as needed for anxiety      cloNIDine (CATAPRES) 0.1 MG tablet Take 0.1 mg by mouth 2 times daily      gabapentin (NEURONTIN) 300 MG capsule Take 300 mg by mouth 3 times daily      hydrOXYzine HCl (ATARAX) 25 MG tablet Take 25 mg by mouth 2 times daily as needed for anxiety.      lisinopril (ZESTRIL) 10 MG tablet Take 20 mg by mouth daily      mirtazapine (REMERON) 30  MG tablet Take 30 mg by mouth At Bedtime      Naldemedine Tosylate 0.2 MG TABS Take 0.2 mg by mouth daily. 30 tablet 2    naloxone (NARCAN) 4 MG/0.1ML nasal spray Spray 1 spray (4 mg) into one nostril alternating nostrils as needed for opioid reversal every 2-3 minutes until assistance arrives 0.2 mL 11    propranolol (INDERAL) 20 MG tablet Take 20 mg by mouth 2 times daily States he takes this PRN       No current facility-administered medications for this visit.     No Known Allergies    PMH, PSH, FamHx reviewed    OBJECTIVE                                                      /85   Pulse 95   SpO2 97%     Physical Exam  Vitals and nursing note reviewed.   Constitutional:       General: He is not in acute distress.     Appearance: He is not diaphoretic.   HENT:      Nose: No rhinorrhea.   Eyes:      Extraocular Movements: Extraocular movements intact.      Conjunctiva/sclera: Conjunctivae normal.      Pupils: Pupils are equal, round, and reactive to light.   Pulmonary:      Effort: Pulmonary effort is normal.   Neurological:      Mental Status: He is alert and oriented to person, place, and time.   Psychiatric:         Attention and Perception: Attention and perception normal.         Mood and Affect: Mood and affect normal.         Speech: Speech normal.         Behavior: Behavior normal. Behavior is cooperative.         Thought Content: Thought content normal.         Cognition and Memory: Cognition and memory normal.      Comments: Judgment and insight are fair.     Clinical Opioid Withdrawal Scale   COWS Scoring    Resting Pulse Rate  1  =     Sweating    (over past 1/2 hour) 0  =  no report of chills or flushing   Restlessness  0  =  able to sit still   Pupil size  0  =  pupils pinned or normal size for room light   Bone or Joint Aches    (acute only) 0  =  not present   Runny nose or tearing    (unrelated to cold/allergies) 0  =  not present   GI Upset    (over past 1/2 hour) 0  =  no GI  symptoms   Tremor    (outstretched hands) 0  =  no tremor   Yawning    (during assessment) 0  =  no yawning   Anxiety/Irritability 0  =  none   Gooseflesh skin 0  =  skin is smooth     TOTAL SCORE  Add column for score   1     CONSIDER dose of suboxone with COWS >=8 (moderate withdrawal)  AND if last dose of opioid:  >12hr ago with IR PRESCRIBED opioids (oxycodone, hydrocodone, etc)  >24hr ago with long acting agent (oxycontin, MS contin, etc) OR any illicit opioids d/t likelihood of fentanyl  >4 days ago with methadone    If dose given before withdrawal is moderate/severe, you risk causing precipitated withdrawal  Please see this link from drugabuse.gov for further information on ED-initiated buprenorphine  https://www.drugabuse.gov/nidamed-medical-health-professionals/vhagbwqfht-qkhmjbyw-nfycimekh/initiating-buprenorphine-treatment-in-emergency-department/hqurzcjekr-zlvhr-bqkmmkwoz-jpcbb-lw-jxodavdfl-buprenorphine    Labs:    UDS:    Lab Results   Component Value Date    BUP Screen Positive (A) 04/28/2025    BZO Negative 04/28/2025    BAR Negative 04/28/2025    SURI Negative 04/28/2025    MAMP Negative 04/28/2025    AMP Negative 04/28/2025    MDMA Negative 04/28/2025    MTD Negative 04/28/2025    YHE216 Negative 04/28/2025    OXY Negative 04/28/2025    PCP Negative 04/28/2025    THC Negative 04/28/2025    TEMP 94 F 04/28/2025    SGPOCT 1.025 04/28/2025     *POC urine drug screen does not screen for Fentanyl    Recent Results (from the past 240 hours)   Drugs of Abuse Screen Urine (POC CUPS) POCT    Collection Time: 04/28/25  2:34 PM   Result Value Ref Range    POCT Kit Lot Number [id number]     POCT Kit Expiration Date 08/05/2026     Temperature Urine POCT 94 F 90 F, 92 F, 94 F, 96 F, 98 F, 100 F    Specific Molt POCT 1.025 1.005, 1.015, 1.025    pH Qual Urine POCT 5 pH 4 pH, 5 pH, 7 pH, 9 pH    Creatinine Qual Urine POCT 20 mg/dL 20 mg/dL, 50 mg/dL, 100 mg/dL, 200 mg/dL    Internal QC Qual Urine POCT Valid Valid     Amphetamine Qual Urine POCT Negative Negative    Barbiturate Qual Urine POCT Negative Negative    Buprenorphine Qual Urine POCT Screen Positive (A) Negative    Benzodiazepine Qual Urine POCT Negative Negative    Cocaine Qual Urine POCT Negative Negative    Methamphetamine Qual Urine POCT Negative Negative    MDMA Qual Urine POCT Negative Negative    Methadone Qual Urine POCT Negative Negative    Opiate Qual Urine POCT Negative Negative    Oxycodone Qual Urine POCT Negative Negative    Phencyclidine Qual Urine POCT Negative Negative    THC Qual Urine POCT Negative Negative      Continued Complex Management  The longitudinal plan of care for Opioid Use Disorder (OUD) and Alcohol Use Disorder (AUD) was addressed during this visit. Due to the added complexity in care, I will continue to support Giovani in the subsequent management and with ongoing continuity of care.    I, Diana Robertson CNP, was present with the  [employer] student who participated in the service and in the documentation of the note.  I have verified the history and personally performed the physical exam and medical decision making.  I agree with the assessment and plan of care as documented in the note.      CHARLEY Keith, MARVP Student    [place] [id number] [address]  [phone]

## 2025-04-28 NOTE — NURSING NOTE
[facility]      Rooming information:    Approximate last use of full opioid agonist: kratom, last use yesterday  Taking buprenorphine?Yes, suboxone  How much per day? 1/2-1 daily  Number of buprenorphine films/tablets remaining currently: n/a  Side effects related to buprenorphine (constipation, dry mouth, sedation?) No   Narcan currently available: Yes  Other recent substance use:    Prescription drugs: clonidine, mirtazapine, clonazepam  NICOTINE-Yes: vape  If using nicotine, ready to quit? No    Point of care urine drug screen positive for:  Lab Results   Component Value Date    BUP Screen Positive (A) 03/27/2025    BZO Negative 03/27/2025    BAR Negative 03/27/2025    SURI Negative 03/27/2025    MAMP Negative 03/27/2025    AMP Negative 03/27/2025    MDMA Negative 03/27/2025    MTD Negative 03/27/2025    [id number] Negative 03/27/2025    OXY Negative 03/27/2025    PCP Negative 03/27/2025    THC Negative 03/27/2025    TEMP Invalid (A) 03/27/2025    SGPOCT 1.015 03/27/2025       *POC urine drug screen does not screen for Fentanyl      Depression Response    Patient completed the PHQ-9 assessment for depression and scored >9? No  Question 9 on the PHQ-9 was positive for suicidality? No  Does patient have current mental health provider? Yes  C-SSRS screener risk level.       Is this a virtual visit? No    I personally notified the following: visit provider          3/27/2025    11:00 AM   PHQ Assesment Total Score(s)   PHQ-9 Score 8         Housing needs: Stable     Insurance: Active     Current legal issues: Denies     Contact information up to date? Yes     3rd Party Involvement None today (please obtain HANANE if pt would like to include)      Godwin Lal MA  April 28, 2025  2:26 PM

## 2025-04-29 ENCOUNTER — TELEPHONE (OUTPATIENT)
Dept: BEHAVIORAL HEALTH | Facility: CLINIC | Age: 45
End: 2025-04-29

## 2025-05-07 ENCOUNTER — INFUSION THERAPY VISIT (OUTPATIENT)
Dept: INFUSION THERAPY | Facility: CLINIC | Age: 45
End: 2025-05-07
Attending: FAMILY MEDICINE
Payer: COMMERCIAL

## 2025-05-07 VITALS — HEART RATE: 77 BPM | RESPIRATION RATE: 18 BRPM | SYSTOLIC BLOOD PRESSURE: 142 MMHG | DIASTOLIC BLOOD PRESSURE: 89 MMHG

## 2025-05-07 DIAGNOSIS — F11.20 OPIOID USE DISORDER, SEVERE, DEPENDENCE (H): Primary | ICD-10-CM

## 2025-05-07 PROCEDURE — 96372 THER/PROPH/DIAG INJ SC/IM: CPT

## 2025-05-07 PROCEDURE — 250N000011 HC RX IP 250 OP 636: Mod: JZ

## 2025-05-07 PROCEDURE — 250N000009 HC RX 250

## 2025-05-07 RX ORDER — LIDOCAINE HYDROCHLORIDE 10 MG/ML
2 INJECTION, SOLUTION EPIDURAL; INFILTRATION; INTRACAUDAL; PERINEURAL ONCE
Status: COMPLETED | OUTPATIENT
Start: 2025-05-07 | End: 2025-05-07

## 2025-05-07 RX ORDER — METHYLPREDNISOLONE SODIUM SUCCINATE 40 MG/ML
40 INJECTION INTRAMUSCULAR; INTRAVENOUS
Start: 2025-06-04

## 2025-05-07 RX ORDER — ALBUTEROL SULFATE 90 UG/1
1-2 INHALANT RESPIRATORY (INHALATION)
Start: 2025-06-04

## 2025-05-07 RX ORDER — ALBUTEROL SULFATE 0.83 MG/ML
2.5 SOLUTION RESPIRATORY (INHALATION)
OUTPATIENT
Start: 2025-06-04

## 2025-05-07 RX ORDER — DIPHENHYDRAMINE HYDROCHLORIDE 50 MG/ML
25 INJECTION, SOLUTION INTRAMUSCULAR; INTRAVENOUS
Start: 2025-06-04

## 2025-05-07 RX ORDER — EPINEPHRINE 1 MG/ML
0.3 INJECTION, SOLUTION, CONCENTRATE INTRAVENOUS EVERY 5 MIN PRN
OUTPATIENT
Start: 2025-06-04

## 2025-05-07 RX ORDER — LIDOCAINE HYDROCHLORIDE 10 MG/ML
2 INJECTION, SOLUTION EPIDURAL; INFILTRATION; INTRACAUDAL; PERINEURAL ONCE
OUTPATIENT
Start: 2025-06-04 | End: 2025-06-04

## 2025-05-07 RX ORDER — DIPHENHYDRAMINE HYDROCHLORIDE 50 MG/ML
50 INJECTION, SOLUTION INTRAMUSCULAR; INTRAVENOUS
Start: 2025-06-04

## 2025-05-07 RX ADMIN — BUPRENORPHINE 300 MG: 300 SOLUTION SUBCUTANEOUS at 15:41

## 2025-05-07 RX ADMIN — LIDOCAINE HYDROCHLORIDE 2 ML: 10 INJECTION, SOLUTION EPIDURAL; INFILTRATION; INTRACAUDAL; PERINEURAL at 15:39

## 2025-05-07 ASSESSMENT — PAIN SCALES - GENERAL: PAINLEVEL_OUTOF10: NO PAIN (0)

## 2025-05-07 NOTE — PROGRESS NOTES
Infusion Nursing Note:  Giovani VICENTE HopeDamian presents today for Sublocade 300 mg.    Patient seen by provider today: No   present during visit today: Not Applicable.    Note: Declined handout on med as he has been on this before.  Lidocaine then Sublocade injected into RLQ.      Intravenous Access:  No Intravenous access/labs at this visit.    Treatment Conditions:  Denies opioid use in last week.  Has been on oral suboxone for more than 1 week.      Post Infusion Assessment:  Patient tolerated injection without incident.       Discharge Plan:   Patient and/or family verbalized understanding of discharge instructions and all questions answered.  Patient discharged in stable condition accompanied by: self.  Departure Mode: Ambulatory.      GARY ROME RN

## 2025-06-04 ENCOUNTER — INFUSION THERAPY VISIT (OUTPATIENT)
Dept: INFUSION THERAPY | Facility: CLINIC | Age: 45
End: 2025-06-04
Attending: FAMILY MEDICINE
Payer: COMMERCIAL

## 2025-06-04 VITALS — SYSTOLIC BLOOD PRESSURE: 123 MMHG | DIASTOLIC BLOOD PRESSURE: 81 MMHG | OXYGEN SATURATION: 97 % | HEART RATE: 78 BPM

## 2025-06-04 DIAGNOSIS — F11.20 OPIOID USE DISORDER, SEVERE, DEPENDENCE (H): Primary | ICD-10-CM

## 2025-06-04 PROCEDURE — 250N000009 HC RX 250

## 2025-06-04 PROCEDURE — 250N000011 HC RX IP 250 OP 636: Mod: JZ

## 2025-06-04 PROCEDURE — 96372 THER/PROPH/DIAG INJ SC/IM: CPT

## 2025-06-04 RX ORDER — LIDOCAINE HYDROCHLORIDE 10 MG/ML
2 INJECTION, SOLUTION EPIDURAL; INFILTRATION; INTRACAUDAL; PERINEURAL ONCE
OUTPATIENT
Start: 2025-07-01 | End: 2025-07-01

## 2025-06-04 RX ORDER — ALBUTEROL SULFATE 90 UG/1
1-2 INHALANT RESPIRATORY (INHALATION)
Start: 2025-07-01

## 2025-06-04 RX ORDER — METHYLPREDNISOLONE SODIUM SUCCINATE 40 MG/ML
40 INJECTION INTRAMUSCULAR; INTRAVENOUS
Start: 2025-07-01

## 2025-06-04 RX ORDER — DIPHENHYDRAMINE HYDROCHLORIDE 50 MG/ML
50 INJECTION, SOLUTION INTRAMUSCULAR; INTRAVENOUS
Start: 2025-07-01

## 2025-06-04 RX ORDER — LIDOCAINE HYDROCHLORIDE 10 MG/ML
2 INJECTION, SOLUTION EPIDURAL; INFILTRATION; INTRACAUDAL; PERINEURAL ONCE
Status: COMPLETED | OUTPATIENT
Start: 2025-06-04 | End: 2025-06-04

## 2025-06-04 RX ORDER — ALBUTEROL SULFATE 0.83 MG/ML
2.5 SOLUTION RESPIRATORY (INHALATION)
OUTPATIENT
Start: 2025-07-01

## 2025-06-04 RX ORDER — EPINEPHRINE 1 MG/ML
0.3 INJECTION, SOLUTION, CONCENTRATE INTRAVENOUS EVERY 5 MIN PRN
OUTPATIENT
Start: 2025-07-01

## 2025-06-04 RX ORDER — DIPHENHYDRAMINE HYDROCHLORIDE 50 MG/ML
25 INJECTION, SOLUTION INTRAMUSCULAR; INTRAVENOUS
Start: 2025-07-01

## 2025-06-04 RX ADMIN — LIDOCAINE HYDROCHLORIDE 2 ML: 10 INJECTION, SOLUTION EPIDURAL; INFILTRATION; INTRACAUDAL; PERINEURAL at 15:31

## 2025-06-04 RX ADMIN — BUPRENORPHINE 300 MG: 300 SOLUTION SUBCUTANEOUS at 15:37

## 2025-06-04 NOTE — PROGRESS NOTES
Infusion Nursing Note:  Giovani Damian presents today for sublocade 300mg.    Patient seen by provider today: No   present during visit today: Not Applicable.    Note: Pt denies opioid use and withdrawal. No evidence of tampering. Lidocaine and sublocade given LLQ.       Intravenous Access:  No Intravenous access/labs at this visit.    Treatment Conditions:  Not Applicable.      Post Infusion Assessment:  Patient tolerated injection without incident.       Discharge Plan:   Discharge instructions reviewed with: Patient.  Patient and/or family verbalized understanding of discharge instructions and all questions answered.  Patient discharged in stable condition accompanied by: self.  Departure Mode: Ambulatory.      Ashley Ferrell RN

## 2025-07-03 ENCOUNTER — INFUSION THERAPY VISIT (OUTPATIENT)
Dept: INFUSION THERAPY | Facility: CLINIC | Age: 45
End: 2025-07-03
Attending: FAMILY MEDICINE
Payer: COMMERCIAL

## 2025-07-03 VITALS — SYSTOLIC BLOOD PRESSURE: 125 MMHG | OXYGEN SATURATION: 97 % | DIASTOLIC BLOOD PRESSURE: 82 MMHG | HEART RATE: 82 BPM

## 2025-07-03 DIAGNOSIS — F11.20 OPIOID USE DISORDER, SEVERE, DEPENDENCE (H): Primary | ICD-10-CM

## 2025-07-03 PROCEDURE — 250N000009 HC RX 250

## 2025-07-03 PROCEDURE — 250N000011 HC RX IP 250 OP 636: Mod: JZ

## 2025-07-03 PROCEDURE — 96372 THER/PROPH/DIAG INJ SC/IM: CPT

## 2025-07-03 RX ORDER — LIDOCAINE HYDROCHLORIDE 10 MG/ML
2 INJECTION, SOLUTION EPIDURAL; INFILTRATION; INTRACAUDAL; PERINEURAL ONCE
Status: COMPLETED | OUTPATIENT
Start: 2025-07-03 | End: 2025-07-03

## 2025-07-03 RX ORDER — ALBUTEROL SULFATE 0.83 MG/ML
2.5 SOLUTION RESPIRATORY (INHALATION)
OUTPATIENT
Start: 2025-07-29

## 2025-07-03 RX ORDER — DIPHENHYDRAMINE HYDROCHLORIDE 50 MG/ML
50 INJECTION, SOLUTION INTRAMUSCULAR; INTRAVENOUS
Start: 2025-07-29

## 2025-07-03 RX ORDER — ALBUTEROL SULFATE 90 UG/1
1-2 INHALANT RESPIRATORY (INHALATION)
Start: 2025-07-29

## 2025-07-03 RX ORDER — METHYLPREDNISOLONE SODIUM SUCCINATE 40 MG/ML
40 INJECTION INTRAMUSCULAR; INTRAVENOUS
Start: 2025-07-29

## 2025-07-03 RX ORDER — LIDOCAINE HYDROCHLORIDE 10 MG/ML
2 INJECTION, SOLUTION EPIDURAL; INFILTRATION; INTRACAUDAL; PERINEURAL ONCE
OUTPATIENT
Start: 2025-07-29 | End: 2025-07-29

## 2025-07-03 RX ORDER — DIPHENHYDRAMINE HYDROCHLORIDE 50 MG/ML
25 INJECTION, SOLUTION INTRAMUSCULAR; INTRAVENOUS
Start: 2025-07-29

## 2025-07-03 RX ORDER — EPINEPHRINE 1 MG/ML
0.3 INJECTION, SOLUTION, CONCENTRATE INTRAVENOUS EVERY 5 MIN PRN
OUTPATIENT
Start: 2025-07-29

## 2025-07-03 RX ADMIN — BUPRENORPHINE 100 MG: 100 SOLUTION SUBCUTANEOUS at 14:57

## 2025-07-03 RX ADMIN — LIDOCAINE HYDROCHLORIDE 2 ML: 10 INJECTION, SOLUTION EPIDURAL; INFILTRATION; INTRACAUDAL; PERINEURAL at 14:54

## 2025-07-03 NOTE — PROGRESS NOTES
Infusion Nursing Note:  Giovani ZHANG Damian presents today for sublocade 100mg.    Patient seen by provider today: No   present during visit today: Not Applicable.    Note: Pt denies opioid use and withdrawal. No evidence of tampering. Lidocaine and sublocade given RLQ.       Intravenous Access:  No Intravenous access/labs at this visit.    Treatment Conditions:  Not Applicable.      Post Infusion Assessment:  Patient tolerated injection without incident.       Discharge Plan:   Discharge instructions reviewed with: Patient.  Patient and/or family verbalized understanding of discharge instructions and all questions answered.  Patient discharged in stable condition accompanied by: self.  Departure Mode: Ambulatory.      Ashley Ferrell RN

## 2025-07-13 ENCOUNTER — HEALTH MAINTENANCE LETTER (OUTPATIENT)
Age: 45
End: 2025-07-13

## 2025-08-11 ENCOUNTER — TELEPHONE (OUTPATIENT)
Dept: BEHAVIORAL HEALTH | Facility: CLINIC | Age: 45
End: 2025-08-11
Payer: COMMERCIAL

## 2025-08-18 ENCOUNTER — OFFICE VISIT (OUTPATIENT)
Dept: BEHAVIORAL HEALTH | Facility: CLINIC | Age: 45
End: 2025-08-18
Payer: COMMERCIAL

## 2025-08-18 ENCOUNTER — INFUSION THERAPY VISIT (OUTPATIENT)
Dept: INFUSION THERAPY | Facility: CLINIC | Age: 45
End: 2025-08-18
Attending: FAMILY MEDICINE
Payer: COMMERCIAL

## 2025-08-18 VITALS — DIASTOLIC BLOOD PRESSURE: 73 MMHG | OXYGEN SATURATION: 98 % | SYSTOLIC BLOOD PRESSURE: 110 MMHG | HEART RATE: 71 BPM

## 2025-08-18 DIAGNOSIS — F11.20 OPIOID USE DISORDER, SEVERE, DEPENDENCE (H): Primary | ICD-10-CM

## 2025-08-18 DIAGNOSIS — F10.90 ALCOHOL USE DISORDER: ICD-10-CM

## 2025-08-18 PROCEDURE — 3074F SYST BP LT 130 MM HG: CPT

## 2025-08-18 PROCEDURE — 3078F DIAST BP <80 MM HG: CPT

## 2025-08-18 PROCEDURE — 250N000009 HC RX 250

## 2025-08-18 PROCEDURE — 99214 OFFICE O/P EST MOD 30 MIN: CPT

## 2025-08-18 PROCEDURE — 80305 DRUG TEST PRSMV DIR OPT OBS: CPT

## 2025-08-18 PROCEDURE — 96372 THER/PROPH/DIAG INJ SC/IM: CPT

## 2025-08-18 PROCEDURE — 250N000011 HC RX IP 250 OP 636: Mod: JZ

## 2025-08-18 PROCEDURE — G2211 COMPLEX E/M VISIT ADD ON: HCPCS

## 2025-08-18 RX ORDER — ALBUTEROL SULFATE 0.83 MG/ML
2.5 SOLUTION RESPIRATORY (INHALATION)
OUTPATIENT
Start: 2025-08-28

## 2025-08-18 RX ORDER — LIDOCAINE HYDROCHLORIDE 10 MG/ML
2 INJECTION, SOLUTION EPIDURAL; INFILTRATION; INTRACAUDAL; PERINEURAL ONCE
OUTPATIENT
Start: 2025-08-28 | End: 2025-08-28

## 2025-08-18 RX ORDER — METHYLPREDNISOLONE SODIUM SUCCINATE 40 MG/ML
40 INJECTION INTRAMUSCULAR; INTRAVENOUS
Start: 2025-08-28

## 2025-08-18 RX ORDER — DIPHENHYDRAMINE HYDROCHLORIDE 50 MG/ML
25 INJECTION, SOLUTION INTRAMUSCULAR; INTRAVENOUS
Start: 2025-08-28

## 2025-08-18 RX ORDER — LIDOCAINE HYDROCHLORIDE 10 MG/ML
2 INJECTION, SOLUTION EPIDURAL; INFILTRATION; INTRACAUDAL; PERINEURAL ONCE
Status: COMPLETED | OUTPATIENT
Start: 2025-08-18 | End: 2025-08-18

## 2025-08-18 RX ORDER — EPINEPHRINE 1 MG/ML
0.3 INJECTION, SOLUTION, CONCENTRATE INTRAVENOUS EVERY 5 MIN PRN
OUTPATIENT
Start: 2025-08-28

## 2025-08-18 RX ORDER — ALBUTEROL SULFATE 90 UG/1
1-2 INHALANT RESPIRATORY (INHALATION)
Start: 2025-08-28

## 2025-08-18 RX ORDER — DIPHENHYDRAMINE HYDROCHLORIDE 50 MG/ML
50 INJECTION, SOLUTION INTRAMUSCULAR; INTRAVENOUS
Start: 2025-08-28

## 2025-08-18 RX ADMIN — LIDOCAINE HYDROCHLORIDE 2 ML: 10 INJECTION, SOLUTION EPIDURAL; INFILTRATION; INTRACAUDAL; PERINEURAL at 16:03

## 2025-08-18 RX ADMIN — BUPRENORPHINE 100 MG: 100 SOLUTION SUBCUTANEOUS at 16:06

## 2025-08-18 ASSESSMENT — PATIENT HEALTH QUESTIONNAIRE - PHQ9: SUM OF ALL RESPONSES TO PHQ QUESTIONS 1-9: 5

## (undated) RX ORDER — LIDOCAINE HYDROCHLORIDE 10 MG/ML
INJECTION, SOLUTION EPIDURAL; INFILTRATION; INTRACAUDAL; PERINEURAL
Status: DISPENSED
Start: 2024-03-21

## (undated) RX ORDER — LIDOCAINE HYDROCHLORIDE 10 MG/ML
INJECTION, SOLUTION EPIDURAL; INFILTRATION; INTRACAUDAL; PERINEURAL
Status: DISPENSED
Start: 2025-05-07

## (undated) RX ORDER — LIDOCAINE HYDROCHLORIDE 10 MG/ML
INJECTION, SOLUTION EPIDURAL; INFILTRATION; INTRACAUDAL; PERINEURAL
Status: DISPENSED
Start: 2024-05-16

## (undated) RX ORDER — LIDOCAINE HYDROCHLORIDE 10 MG/ML
INJECTION, SOLUTION EPIDURAL; INFILTRATION; INTRACAUDAL; PERINEURAL
Status: DISPENSED
Start: 2024-09-13

## (undated) RX ORDER — LIDOCAINE HYDROCHLORIDE 10 MG/ML
INJECTION, SOLUTION EPIDURAL; INFILTRATION; INTRACAUDAL; PERINEURAL
Status: DISPENSED
Start: 2025-06-04

## (undated) RX ORDER — LIDOCAINE HYDROCHLORIDE 10 MG/ML
INJECTION, SOLUTION EPIDURAL; INFILTRATION; INTRACAUDAL; PERINEURAL
Status: DISPENSED
Start: 2025-01-20

## (undated) RX ORDER — LIDOCAINE HYDROCHLORIDE 10 MG/ML
INJECTION, SOLUTION EPIDURAL; INFILTRATION; INTRACAUDAL; PERINEURAL
Status: DISPENSED
Start: 2024-08-16

## (undated) RX ORDER — LIDOCAINE HYDROCHLORIDE 10 MG/ML
INJECTION, SOLUTION EPIDURAL; INFILTRATION; INTRACAUDAL; PERINEURAL
Status: DISPENSED
Start: 2024-10-11

## (undated) RX ORDER — LIDOCAINE HYDROCHLORIDE 10 MG/ML
INJECTION, SOLUTION EPIDURAL; INFILTRATION; INTRACAUDAL; PERINEURAL
Status: DISPENSED
Start: 2024-06-13

## (undated) RX ORDER — LIDOCAINE HYDROCHLORIDE 10 MG/ML
INJECTION, SOLUTION EPIDURAL; INFILTRATION; INTRACAUDAL; PERINEURAL
Status: DISPENSED
Start: 2025-08-18

## (undated) RX ORDER — LIDOCAINE HYDROCHLORIDE 10 MG/ML
INJECTION, SOLUTION EPIDURAL; INFILTRATION; INTRACAUDAL; PERINEURAL
Status: DISPENSED
Start: 2025-07-03

## (undated) RX ORDER — LIDOCAINE HYDROCHLORIDE 10 MG/ML
INJECTION, SOLUTION EPIDURAL; INFILTRATION; INTRACAUDAL; PERINEURAL
Status: DISPENSED
Start: 2024-11-08

## (undated) RX ORDER — LIDOCAINE HYDROCHLORIDE 10 MG/ML
INJECTION, SOLUTION EPIDURAL; INFILTRATION; INTRACAUDAL; PERINEURAL
Status: DISPENSED
Start: 2024-07-18

## (undated) RX ORDER — LIDOCAINE HYDROCHLORIDE 10 MG/ML
INJECTION, SOLUTION EPIDURAL; INFILTRATION; INTRACAUDAL; PERINEURAL
Status: DISPENSED
Start: 2024-04-18